# Patient Record
Sex: FEMALE | Race: WHITE | Employment: UNEMPLOYED | URBAN - NONMETROPOLITAN AREA
[De-identification: names, ages, dates, MRNs, and addresses within clinical notes are randomized per-mention and may not be internally consistent; named-entity substitution may affect disease eponyms.]

---

## 2017-05-16 ENCOUNTER — OFFICE VISIT (OUTPATIENT)
Dept: OBGYN | Age: 52
End: 2017-05-16
Payer: COMMERCIAL

## 2017-05-16 ENCOUNTER — HOSPITAL ENCOUNTER (OUTPATIENT)
Dept: WOMENS IMAGING | Age: 52
Discharge: HOME | End: 2017-05-16
Payer: COMMERCIAL

## 2017-05-16 VITALS
DIASTOLIC BLOOD PRESSURE: 74 MMHG | HEART RATE: 68 BPM | WEIGHT: 126 LBS | BODY MASS INDEX: 19.78 KG/M2 | SYSTOLIC BLOOD PRESSURE: 121 MMHG | HEIGHT: 67 IN

## 2017-05-16 DIAGNOSIS — N63.20 BREAST MASS, LEFT: ICD-10-CM

## 2017-05-16 DIAGNOSIS — Z98.82 HISTORY OF BREAST AUGMENTATION: ICD-10-CM

## 2017-05-16 DIAGNOSIS — N64.4 BREAST TENDERNESS IN FEMALE: Primary | ICD-10-CM

## 2017-05-16 DIAGNOSIS — N64.4 BREAST TENDERNESS IN FEMALE: ICD-10-CM

## 2017-05-16 PROCEDURE — 99213 OFFICE O/P EST LOW 20 MIN: CPT

## 2017-05-16 PROCEDURE — 76642 ULTRASOUND BREAST LIMITED: CPT

## 2017-05-16 PROCEDURE — G0279 TOMOSYNTHESIS, MAMMO: HCPCS

## 2017-05-16 ASSESSMENT — ENCOUNTER SYMPTOMS
BACK PAIN: 0
BLOOD IN STOOL: 0
TROUBLE SWALLOWING: 0
SHORTNESS OF BREATH: 0
CONSTIPATION: 0
COLOR CHANGE: 0
DIARRHEA: 0
COUGH: 0

## 2017-08-29 ENCOUNTER — OFFICE VISIT (OUTPATIENT)
Dept: OBGYN | Age: 52
End: 2017-08-29
Payer: COMMERCIAL

## 2017-08-29 ENCOUNTER — HOSPITAL ENCOUNTER (OUTPATIENT)
Age: 52
Setting detail: SPECIMEN
Discharge: HOME | End: 2017-08-29
Payer: COMMERCIAL

## 2017-08-29 VITALS
HEART RATE: 64 BPM | TEMPERATURE: 98.7 F | BODY MASS INDEX: 20.88 KG/M2 | SYSTOLIC BLOOD PRESSURE: 101 MMHG | HEIGHT: 67 IN | WEIGHT: 133 LBS | DIASTOLIC BLOOD PRESSURE: 69 MMHG

## 2017-08-29 DIAGNOSIS — D50.8 IRON DEFICIENCY ANEMIA SECONDARY TO INADEQUATE DIETARY IRON INTAKE: ICD-10-CM

## 2017-08-29 DIAGNOSIS — Z01.419 ENCOUNTER FOR GYNECOLOGICAL EXAMINATION WITHOUT ABNORMAL FINDING: Primary | ICD-10-CM

## 2017-08-29 PROCEDURE — 88142 CYTOPATH C/V THIN LAYER: CPT

## 2017-08-29 PROCEDURE — 99396 PREV VISIT EST AGE 40-64: CPT

## 2017-08-29 ASSESSMENT — ENCOUNTER SYMPTOMS
DIARRHEA: 0
COUGH: 0
CONSTIPATION: 0
BACK PAIN: 0
COLOR CHANGE: 0
TROUBLE SWALLOWING: 0
SHORTNESS OF BREATH: 0
BLOOD IN STOOL: 0

## 2017-12-05 ENCOUNTER — OFFICE VISIT (OUTPATIENT)
Dept: OBGYN | Age: 52
End: 2017-12-05
Payer: COMMERCIAL

## 2017-12-05 VITALS
DIASTOLIC BLOOD PRESSURE: 78 MMHG | HEIGHT: 67 IN | HEART RATE: 54 BPM | WEIGHT: 141 LBS | SYSTOLIC BLOOD PRESSURE: 124 MMHG | BODY MASS INDEX: 22.13 KG/M2

## 2017-12-05 DIAGNOSIS — N95.1 PERIMENOPAUSAL SYMPTOMS: ICD-10-CM

## 2017-12-05 DIAGNOSIS — Z98.82 HISTORY OF BREAST AUGMENTATION: ICD-10-CM

## 2017-12-05 DIAGNOSIS — Z80.3 FAMILY HISTORY OF BREAST CANCER IN FEMALE: ICD-10-CM

## 2017-12-05 DIAGNOSIS — E34.9 TESTOSTERONE INSUFFICIENCY: ICD-10-CM

## 2017-12-05 DIAGNOSIS — R92.1 BREAST CALCIFICATION, LEFT: ICD-10-CM

## 2017-12-05 DIAGNOSIS — E55.9 VITAMIN D DEFICIENCY: ICD-10-CM

## 2017-12-05 DIAGNOSIS — N64.4 BREAST PAIN IN FEMALE: Primary | ICD-10-CM

## 2017-12-05 DIAGNOSIS — R53.83 OTHER FATIGUE: ICD-10-CM

## 2017-12-05 PROCEDURE — 99214 OFFICE O/P EST MOD 30 MIN: CPT

## 2017-12-05 RX ORDER — MULTIVIT-MIN/IRON/FOLIC ACID/K 18-600-40
CAPSULE ORAL
COMMUNITY
End: 2020-06-08 | Stop reason: ALTCHOICE

## 2017-12-05 ASSESSMENT — ENCOUNTER SYMPTOMS
RESPIRATORY NEGATIVE: 1
GASTROINTESTINAL NEGATIVE: 1
EYES NEGATIVE: 1
ALLERGIC/IMMUNOLOGIC NEGATIVE: 1

## 2017-12-05 NOTE — PATIENT INSTRUCTIONS
problems, or circulation problems in the blood vessels of your eyes;  · endometriosis;  · a problem with your thyroid or gallbladder;  · kidney disease;  · asthma or other breathing problems;  · epilepsy or other seizure disorder;  · migraine headaches;  · systemic lupus erythematosus (SLE);  · high levels of calcium in your blood;  · risk factors for coronary artery disease (such as diabetes, smoking, being overweight, having high blood pressure or high cholesterol); or  · if you use hormone medications to treat cancer of the breast, ovary, or uterus. This medicine may increase your risk of developing cancer of the breast or uterus. Your risk of uterine cancer may be greater if you are overweight. This medicine may also increase your risk of dementia, heart attack, stroke, or blood clot. Ask your doctor about your individual risk. FDA pregnancy category X. This medication can harm an unborn baby or cause birth defects. Do not use bazedoxifene and conjugated estrogens if you are pregnant. Tell your doctor right away if you become pregnant during treatment. It is not known whether bazedoxifene and conjugated estrogens passes into breast milk or if it could harm a nursing baby. You should not breast-feed while using this medicine. How should I take bazedoxifene and conjugated estrogens? This medicine is usually taken once daily. Follow all directions on your prescription label. Bazedoxifene and conjugated estrogens is for short-term use at the lowest dose needed to treat your condition. Do not take this medicine in larger or smaller amounts or for longer than recommended. You may take bazedoxifene and conjugated estrogens with or without food. Take the medicine at the same time each day. Do not crush, chew, or break the tablet. Swallow it whole. Your doctor should check your progress on a regular basis to determine whether you should continue this treatment.   Have regular physical exams and mammograms, and self-examine your breasts for lumps on a monthly basis while using this medicine. If you need surgery or medical tests or if you will be on bed rest, you may need to stop using this medication for a short time. Any doctor or surgeon who treats you should know that you are taking bazedoxifene and conjugated estrogens. Your doctor may have you take extra calcium and vitamin D while you are taking bazedoxifene and conjugated estrogens. Take only the amount that your doctor has prescribed. Store at room temperature away from moisture and heat. Keep each tablet in its blister pack until you are ready to take it. Do not use a pill box for this medicine. Write down the date you open a Duavee foil pouch. After opening the pouch, you should use the medicine within 60 days. What happens if I miss a dose? Take the missed dose as soon as you remember. Skip the missed dose if it is almost time for your next scheduled dose. Do not take extra medicine to make up the missed dose. What happens if I overdose? Seek emergency medical attention or call the Poison Help line at 1-710.331.9420. What should I avoid while taking bazedoxifene and conjugated estrogens? Do not take progestins or any other estrogen while you are taking bazedoxifene and conjugated estrogens. What are the possible side effects of bazedoxifene and conjugated estrogens? Get emergency medical help if you have any of these signs of an allergic reaction: hives; difficult breathing; swelling of your face, lips, tongue, or throat.   Stop using bazedoxifene and conjugated estrogens and call your doctor at once if you have:  · sudden vision loss;  · a lump in your breast;  · signs that you may need a lower dose --abnormal vaginal bleeding, dizziness, tiredness, stomach pain, vomiting, breast tenderness  · signs of a stroke --sudden numbness or weakness (especially on one side of the body), sudden severe headache, slurred speech, problems with vision or balance;  · signs of a blood clot in the lung --chest pain, sudden cough, wheezing, rapid breathing, coughing up blood;  · signs of a blood clot in your leg --pain, swelling, warmth, or redness in one or both legs;  · heart attack symptoms --chest pain or pressure, pain spreading to your jaw or shoulder, nausea, sweating; or  · liver problems --nausea, upper stomach pain, itching, tired feeling, loss of appetite, dark urine, hilda-colored stools, jaundice (yellowing of the skin or eyes). Common side effects may include:  · mild dizziness;  · nausea, stomach pain or discomfort, diarrhea;  · neck pain, muscle spasm; or  · throat or sinus pain. This is not a complete list of side effects and others may occur. Call your doctor for medical advice about side effects. You may report side effects to FDA at 3-279-FDA-6088. What other drugs will affect bazedoxifene and conjugated estrogens? Other drugs may interact with bazedoxifene and conjugated estrogens, including prescription and over-the-counter medicines, vitamins, and herbal products. Tell each of your health care providers about all medicines you use now and any medicine you start or stop using. Where can I get more information? Your pharmacist can provide more information about bazedoxifene and conjugated estrogens. Remember, keep this and all other medicines out of the reach of children, never share your medicines with others, and use this medication only for the indication prescribed. Every effort has been made to ensure that the information provided by [address] is accurate, up-to-date, and complete, but no guarantee is made to that effect. Drug information contained herein may be time sensitive.  [facility] information has been compiled for use by healthcare practitioners and consumers in the [place] and therefore [facility] does not warrant that uses outside of the [place] are appropriate, unless specifically indicated otherwise. [facility] drug information does not endorse drugs, diagnose patients or recommend therapy. [facility] drug information is an informational resource designed to assist licensed healthcare practitioners in caring for their patients and/or to serve consumers viewing this service as a supplement to, and not a substitute for, the expertise, skill, knowledge and judgment of healthcare practitioners. The absence of a warning for a given drug or drug combination in no way should be construed to indicate that the drug or drug combination is safe, effective or appropriate for any given patient. [facility] does not assume any responsibility for any aspect of healthcare administered with the aid of information [facility] provides. The information contained herein is not intended to cover all possible uses, directions, precautions, warnings, drug interactions, allergic reactions, or adverse effects. If you have questions about the drugs you are taking, check with your doctor, nurse or pharmacist.  Copyright 1037-1197 [address]. Version: 1.01. Revision date: 1/9/2014. Care instructions adapted under license by [facility] ([facility]). If you have questions about a medical condition or this instruction, always ask your healthcare professional. [place] [address] any warranty or liability for your use of this information.

## 2017-12-05 NOTE — PROGRESS NOTES
range of motion. Neurological: She is alert and oriented to person, place, and time. Skin: Skin is warm and dry. Psychiatric: She has a normal mood and affect. Her behavior is normal.       Assessment:      1. Breast pain in female  MRI BREAST BILATERAL W WO CONTRAST   2. Vitamin D deficiency     3. Testosterone insufficiency     4. History of breast augmentation  MRI BREAST BILATERAL W WO CONTRAST   5. Perimenopausal symptoms     6. Family history of breast cancer in female  MRI BREAST BILATERAL W WO CONTRAST   7. Other fatigue     8. Breast calcification, left  MRI BREAST BILATERAL W WO CONTRAST           Plan:      MEDICATIONS:  No orders of the defined types were placed in this encounter. ORDERS:  Orders Placed This Encounter   Procedures    MRI BREAST BILATERAL W WO CONTRAST     1. Reviewed all lab work with patient. 2.  Copy scanned into media. 3.  Reviewed mammogram and breast US from May. 4.  The risks and benefits of hormone therapy were discussed with patient. 5.  Discussed a study in which 3 out of 1000 women not on hormone therapy got breast cancer whereas 4 out of 1000 taking the hormones got breast cancer. 6.  The options of bioidentical HRT versus duavee were discussed. 7.  HRT is not recommended for any patient with a personal history of breast cancer or blood clots. 8.  OTC soy based medication discussed. 9.  Duavee (pamphlet provided), bio identical (pamphlet provided) discussed through compounding pharmacy. 10.  Pt will finish Vit d supplementation and see how she is feeling  11. MRI to be ordered due to family history and nl mammo and ultrasound in May. I spent over 25 min with this patient of which over half involved discussing plan of care for labs, symptoms, breast pain and treatment options.

## 2018-01-09 ENCOUNTER — HOSPITAL ENCOUNTER (OUTPATIENT)
Dept: MRI IMAGING | Age: 53
Discharge: HOME | End: 2018-01-09
Payer: COMMERCIAL

## 2018-01-09 DIAGNOSIS — Z80.3 FAMILY HISTORY OF BREAST CANCER IN FEMALE: ICD-10-CM

## 2018-01-09 DIAGNOSIS — Z98.82 HISTORY OF BREAST AUGMENTATION: ICD-10-CM

## 2018-01-09 DIAGNOSIS — R92.1 BREAST CALCIFICATION, LEFT: ICD-10-CM

## 2018-01-09 DIAGNOSIS — N64.4 BREAST PAIN IN FEMALE: ICD-10-CM

## 2018-01-19 ENCOUNTER — HOSPITAL ENCOUNTER (OUTPATIENT)
Dept: MRI IMAGING | Age: 53
Discharge: HOME | End: 2018-01-19
Payer: COMMERCIAL

## 2018-01-19 DIAGNOSIS — Z98.82 HISTORY OF BREAST AUGMENTATION: ICD-10-CM

## 2018-01-19 DIAGNOSIS — N64.4 BREAST PAIN IN FEMALE: ICD-10-CM

## 2018-01-19 DIAGNOSIS — Z80.3 FAMILY HISTORY OF BREAST CANCER IN FEMALE: ICD-10-CM

## 2018-01-19 DIAGNOSIS — R92.1 BREAST CALCIFICATION, LEFT: ICD-10-CM

## 2018-01-19 PROCEDURE — A9577 INJ MULTIHANCE: HCPCS

## 2018-01-19 PROCEDURE — 6360000004 HC RX CONTRAST MEDICATION

## 2018-01-19 PROCEDURE — C8908 MRI W/O FOL W/CONT, BREAST,: HCPCS

## 2018-01-19 RX ADMIN — GADOBENATE DIMEGLUMINE 12.5 ML: 529 INJECTION, SOLUTION INTRAVENOUS at 13:45

## 2018-01-24 ENCOUNTER — TELEPHONE (OUTPATIENT)
Dept: OBGYN | Age: 53
End: 2018-01-24

## 2018-01-24 NOTE — TELEPHONE ENCOUNTER
Pt is aware of results  and wants to take [address]  and Testosterone (whatever pharmacy)  that Dr Fred Naidu discussed and recommended with pt. Pt v/u of results.

## 2018-07-09 ENCOUNTER — HOSPITAL ENCOUNTER (OUTPATIENT)
Dept: NON INVASIVE DIAGNOSTICS | Age: 53
Discharge: HOME | End: 2018-07-09
Payer: COMMERCIAL

## 2018-07-09 PROCEDURE — 0296T PR EXT ECG > 48HR TO 21 DAY RCRD W/CONECT INTL RCRD: CPT | Performed by: INTERNAL MEDICINE

## 2018-07-10 ENCOUNTER — HOSPITAL ENCOUNTER (OUTPATIENT)
Dept: NON INVASIVE DIAGNOSTICS | Age: 53
Discharge: HOME | End: 2018-07-10
Payer: COMMERCIAL

## 2018-07-10 PROCEDURE — 93229 REMOTE 30 DAY ECG TECH SUPP: CPT

## 2018-07-31 PROCEDURE — 0298T PR EXT ECG > 48HR TO 21 DAY REVIEW AND INTERPRETATN: CPT | Performed by: INTERNAL MEDICINE

## 2018-07-31 NOTE — PROCEDURES
[id number] [facility]    EDER Report      Kamaljit Abreu    : 1965      Test Date:  18    Analysis Date:  18    Date Interpreted: 2018        1. Nearly 12 days 8 hours of rhythm are processed. 2.  The underlying rhythm is normal sinus rhythm at a mean heart rate of 71 bpm, with a range of 42 to 174 bpm.    3.  The were 82428 extra supraventricular beats. The majority of these were single isolated PAC's; there was the fastest episode of SVT lasting 8 beats at a rate of 174 bpm, and the longest SVT lasting 13 beats at a rate of 104 bpm.    4.  The were 0 extra ventricular beats. The majority of these were single isolated PVC's, there was no VT. 5.  Prolonged pauses or high degree AV block were not noted. 6.  Triggered events or triggered events were reported and included SVT, NSR, PACs and ventricular bigeminy and ventricular trigeminy. Impression: This ZIO Patch shows significant supraventricular ectopy and is without prolonged pauses. Symptoms were reported with extra beats. There was no significant ventricular ectopy.         Electronically signed by Sharin Rubinstein, MD on 18

## 2018-12-04 ENCOUNTER — OFFICE VISIT (OUTPATIENT)
Dept: OBGYN | Age: 53
End: 2018-12-04
Payer: COMMERCIAL

## 2018-12-04 VITALS
HEIGHT: 67 IN | WEIGHT: 141 LBS | TEMPERATURE: 98.7 F | HEART RATE: 63 BPM | DIASTOLIC BLOOD PRESSURE: 77 MMHG | BODY MASS INDEX: 22.13 KG/M2 | SYSTOLIC BLOOD PRESSURE: 118 MMHG

## 2018-12-04 DIAGNOSIS — Z80.3 FAMILY HISTORY OF BREAST CANCER: ICD-10-CM

## 2018-12-04 DIAGNOSIS — N64.4 BREAST TENDERNESS IN FEMALE: Primary | ICD-10-CM

## 2018-12-04 PROCEDURE — 99213 OFFICE O/P EST LOW 20 MIN: CPT | Performed by: NURSE PRACTITIONER

## 2018-12-04 ASSESSMENT — ENCOUNTER SYMPTOMS
GASTROINTESTINAL NEGATIVE: 1
EYES NEGATIVE: 1
RESPIRATORY NEGATIVE: 1

## 2019-01-07 ENCOUNTER — HOSPITAL ENCOUNTER (OUTPATIENT)
Dept: ULTRASOUND IMAGING | Age: 54
Discharge: HOME | End: 2019-01-07
Payer: COMMERCIAL

## 2019-01-07 ENCOUNTER — TELEPHONE (OUTPATIENT)
Dept: OBGYN | Age: 54
End: 2019-01-07

## 2019-01-07 ENCOUNTER — HOSPITAL ENCOUNTER (OUTPATIENT)
Dept: WOMENS IMAGING | Age: 54
Discharge: HOME | End: 2019-01-07
Payer: COMMERCIAL

## 2019-01-07 DIAGNOSIS — Z80.3 FAMILY HISTORY OF BREAST CANCER: ICD-10-CM

## 2019-01-07 DIAGNOSIS — N64.4 BREAST PAIN, LEFT: ICD-10-CM

## 2019-01-07 DIAGNOSIS — N64.4 BREAST TENDERNESS IN FEMALE: ICD-10-CM

## 2019-01-07 PROCEDURE — G0279 TOMOSYNTHESIS, MAMMO: HCPCS

## 2019-01-07 PROCEDURE — 76642 ULTRASOUND BREAST LIMITED: CPT

## 2019-01-14 ENCOUNTER — TELEPHONE (OUTPATIENT)
Dept: OBGYN | Age: 54
End: 2019-01-14

## 2019-02-12 ENCOUNTER — OFFICE VISIT (OUTPATIENT)
Dept: OBGYN | Age: 54
End: 2019-02-12
Payer: COMMERCIAL

## 2019-02-12 VITALS
HEART RATE: 66 BPM | BODY MASS INDEX: 21.82 KG/M2 | HEIGHT: 67 IN | DIASTOLIC BLOOD PRESSURE: 72 MMHG | TEMPERATURE: 98.4 F | WEIGHT: 139 LBS | SYSTOLIC BLOOD PRESSURE: 127 MMHG

## 2019-02-12 DIAGNOSIS — Z12.4 SCREENING FOR CERVICAL CANCER: ICD-10-CM

## 2019-02-12 DIAGNOSIS — Z01.419 WELL WOMAN EXAM: Primary | ICD-10-CM

## 2019-02-12 DIAGNOSIS — N92.6 IRREGULAR PERIODS: ICD-10-CM

## 2019-02-12 DIAGNOSIS — Z11.51 SCREENING FOR HPV (HUMAN PAPILLOMAVIRUS): ICD-10-CM

## 2019-02-12 PROCEDURE — 99396 PREV VISIT EST AGE 40-64: CPT | Performed by: NURSE PRACTITIONER

## 2019-02-12 ASSESSMENT — ENCOUNTER SYMPTOMS
RESPIRATORY NEGATIVE: 1
EYES NEGATIVE: 1
GASTROINTESTINAL NEGATIVE: 1

## 2019-02-18 LAB
HPV TYPE 16: NOT DETECTED
HPV TYPE 18: NOT DETECTED
INTERPRETATION: NORMAL
OTHER HIGH RISK HPV: NOT DETECTED
SOURCE: NORMAL

## 2019-06-19 NOTE — PROGRESS NOTES
Subjective:      Patient ID: Kailee Lara is a 47 y.o. female  MD Sanjay Tavarez, *    HPI  Chief Complaint   Patient presents with    Gastroesophageal Reflux     Patient seen for c/o worsening reflux. Last egd noted gastritis. She was taking lansoprazole bid at that time. She has since reduced to once daily ppi. She tried other PPIs but lansoprazole seemed to work the best.   She says she wakes at night frequently with indigestion. Feels like a sick feeling. Has to take gaviscon for relief. She has been having some other problems also and not sure if related. Reports neck pain, sinus drainage, hormone changes. She is careful with her diet and avoids food triggers. Does not eat late at night. Has hob elevated. She denies dysphagia, melena, vomiting. No weight loss or anemia reported. No lower gi complaints. Colonoscopy screening is up to date. Family History   Problem Relation Age of Onset    Dementia Mother     High Blood Pressure Mother     Cancer Father         Lung-Smoker    Breast Cancer Sister 50    Cancer Maternal Grandfather         rectal cancer    Colon Cancer Maternal Grandfather     Colon Polyps Maternal Grandfather     Esophageal Cancer Neg Hx     Liver Cancer Neg Hx     Liver Disease Neg Hx     Rectal Cancer Neg Hx     Stomach Cancer Neg Hx        Past Medical History:   Diagnosis Date    Acid reflux     Anemia     BRCA negative     Chronic back pain     Irritable bowel syndrome     Skipped heart beats     Thyroid mass        Past Surgical History:   Procedure Laterality Date    BREAST SURGERY      Augmentation,Biopsy+    CHOLECYSTECTOMY      COLONOSCOPY  2008???    [facility]    COLONOSCOPY  12/12/12        HEMORRHOID SURGERY      THYROID SURGERY      Biopsy    TONSILLECTOMY      TUBAL LIGATION      UPPER GASTROINTESTINAL ENDOSCOPY  2008? ?     Dr Neal Sandra  2/12/2015        Current Outpatient Medications   Medication Sig Dispense Refill    Loratadine (CLARITIN PO) Take by mouth daily as needed      mometasone (NASONEX) 50 MCG/ACT nasal spray 2 sprays by Each Nostril route as needed      nortriptyline (PAMELOR) 10 MG capsule Take 1 capsule by mouth nightly 30 capsule 5    Cholecalciferol (VITAMIN D) 2000 units CAPS capsule Take by mouth      lansoprazole (PREVACID) 30 MG capsule TAKE ONE CAPSULE BY MOUTHTWICE A DAY BEFORE MEALS 60 capsule 5    Alum Hydroxide-Mag Carbonate (GAVISCON PO) Take by mouth every evening       levothyroxine (SYNTHROID) 50 MCG tablet Take 50 mcg by mouth Daily.  CARAFATE 1 GM/10ML suspension TAKE 10MLS BY MOUTH THREETIMES DAILY BEFORE MEALS 1200 mL 3     No current facility-administered medications for this visit. Allergies   Allergen Reactions    Celebrex [Celecoxib]     Nsaids         reports that she has never smoked. She has never used smokeless tobacco. She reports that she drinks alcohol. She reports that she does not use drugs. Review of Systems   Constitutional: Negative for appetite change, fever and unexpected weight change. HENT: Negative for sore throat and voice change. Respiratory: Negative for cough, chest tightness and shortness of breath. Cardiovascular: Negative for chest pain, palpitations and leg swelling. Gastrointestinal: Negative for abdominal distention, abdominal pain, blood in stool, constipation, diarrhea, nausea, rectal pain and vomiting. Acid reflux, indigestion   Musculoskeletal: Positive for back pain and neck pain. Negative for arthralgias and gait problem. Skin: Negative for pallor, rash and wound. Allergic/Immunologic: Positive for environmental allergies. Neurological: Negative for dizziness, weakness and light-headedness. Hematological: Negative for adenopathy. Does not bruise/bleed easily. All other systems reviewed and are negative.       Objective: Physical Exam   Constitutional: She is oriented to person, place, and time. She appears well-developed and well-nourished. No distress. /74   Pulse 67   Ht 5' 7\" (1.702 m)   Wt 139 lb (63 kg)   SpO2 100%   BMI 21.77 kg/m²      Eyes: Conjunctivae are normal. No scleral icterus. Neck: No tracheal deviation present. Cardiovascular: Normal rate and regular rhythm. Exam reveals no gallop and no friction rub. No murmur heard. Pulmonary/Chest: Effort normal and breath sounds normal. No respiratory distress. She has no wheezes. She has no rhonchi. She has no rales. Abdominal: Soft. Normal appearance and bowel sounds are normal. She exhibits no distension and no mass. There is no hepatomegaly. There is no tenderness. There is no rebound and no guarding. Musculoskeletal: She exhibits no edema. Neurological: She is alert and oriented to person, place, and time. She has normal strength. Skin: Skin is warm, dry and intact. No cyanosis. No pallor. Psychiatric: She has a normal mood and affect. Her behavior is normal. Thought content normal. Cognition and memory are normal.       Assessment:      1. Chronic GERD    2. Gastritis determined by biopsy    3. Indigestion          Plan:      Okay to use H2 blocker at bedtime or for breakthrough symptoms: zantac or pepcid otc    Add nortriptyline at hs  Orders Placed This Encounter   Medications    nortriptyline (PAMELOR) 10 MG capsule     Sig: Take 1 capsule by mouth nightly     Dispense:  30 capsule     Refill:  5     Continue once daily prevacid    Schedule EGD    Nothing to eat or drink after midnight. No driving for 24 hours after procedure. Bring a  to procedure. No aspirin, NSAIDs, fish oil 5 days before procedure. I have discussed the benefits, alternatives, and risks (including bleeding, perforation and death)  for pursuing Endoscopy (EGD/Colonscopy/EUS/ERCP) with the patient and they are willing to continue.  We also discussed the need for anesthesia, IV access, proper dietary changes, medication changes if necessary, and need for bowel prep (if ordered) prior to their Endoscopic procedure. They are aware they must have someone accompany them to their scheduled procedure to drive them home - they agree to the above and are willing to continue.        Plan for anesthesia: MAC  no reported complications

## 2019-06-20 ENCOUNTER — OFFICE VISIT (OUTPATIENT)
Dept: GASTROENTEROLOGY | Age: 54
End: 2019-06-20
Payer: COMMERCIAL

## 2019-06-20 ENCOUNTER — HOSPITAL ENCOUNTER (OUTPATIENT)
Dept: GENERAL RADIOLOGY | Age: 54
Discharge: HOME | End: 2019-06-20
Payer: COMMERCIAL

## 2019-06-20 VITALS
HEIGHT: 67 IN | OXYGEN SATURATION: 100 % | BODY MASS INDEX: 21.82 KG/M2 | HEART RATE: 67 BPM | DIASTOLIC BLOOD PRESSURE: 74 MMHG | SYSTOLIC BLOOD PRESSURE: 110 MMHG | WEIGHT: 139 LBS

## 2019-06-20 DIAGNOSIS — K30 INDIGESTION: ICD-10-CM

## 2019-06-20 DIAGNOSIS — K21.9 CHRONIC GERD: Primary | ICD-10-CM

## 2019-06-20 DIAGNOSIS — K29.70 GASTRITIS DETERMINED BY BIOPSY: ICD-10-CM

## 2019-06-20 DIAGNOSIS — M54.2 NECK PAIN: ICD-10-CM

## 2019-06-20 PROCEDURE — 72040 X-RAY EXAM NECK SPINE 2-3 VW: CPT

## 2019-06-20 PROCEDURE — 99214 OFFICE O/P EST MOD 30 MIN: CPT | Performed by: NURSE PRACTITIONER

## 2019-06-20 RX ORDER — NORTRIPTYLINE HYDROCHLORIDE 10 MG/1
10 CAPSULE ORAL NIGHTLY
Qty: 30 CAPSULE | Refills: 5 | Status: SHIPPED | OUTPATIENT
Start: 2019-06-20 | End: 2020-06-08 | Stop reason: ALTCHOICE

## 2019-06-20 RX ORDER — MOMETASONE FUROATE 50 UG/1
2 SPRAY, METERED NASAL PRN
COMMUNITY
End: 2021-08-27 | Stop reason: ALTCHOICE

## 2019-06-20 ASSESSMENT — ENCOUNTER SYMPTOMS
NAUSEA: 0
COUGH: 0
SORE THROAT: 0
RECTAL PAIN: 0
CONSTIPATION: 0
SHORTNESS OF BREATH: 0
ABDOMINAL DISTENTION: 0
VOICE CHANGE: 0
BLOOD IN STOOL: 0
DIARRHEA: 0
BACK PAIN: 1
CHEST TIGHTNESS: 0
ABDOMINAL PAIN: 0
VOMITING: 0

## 2019-06-24 ENCOUNTER — TELEPHONE (OUTPATIENT)
Dept: OBGYN | Age: 54
End: 2019-06-24

## 2019-06-24 RX ORDER — FLUCONAZOLE 150 MG/1
TABLET ORAL
Qty: 2 TABLET | Refills: 0 | Status: SHIPPED | OUTPATIENT
Start: 2019-06-24 | End: 2019-06-25

## 2019-06-27 DIAGNOSIS — K21.9 CHRONIC GERD: Primary | ICD-10-CM

## 2019-06-27 RX ORDER — SUCRALFATE ORAL 1 G/10ML
SUSPENSION ORAL
Qty: 1200 ML | Refills: 3 | Status: SHIPPED | OUTPATIENT
Start: 2019-06-27 | End: 2020-06-08 | Stop reason: ALTCHOICE

## 2019-06-28 ENCOUNTER — TELEPHONE (OUTPATIENT)
Dept: NEUROSURGERY | Age: 54
End: 2019-06-28

## 2019-06-28 NOTE — TELEPHONE ENCOUNTER
First attempt to reach patient to schedule new patient appointment with neurosurgery. Patient requests that I call her back on Monday.

## 2019-07-01 ENCOUNTER — TELEPHONE (OUTPATIENT)
Dept: NEUROSURGERY | Age: 54
End: 2019-07-01

## 2019-07-03 ENCOUNTER — TELEPHONE (OUTPATIENT)
Dept: NEUROSURGERY | Age: 54
End: 2019-07-03

## 2019-07-03 NOTE — TELEPHONE ENCOUNTER
Third attempt to reach patient to schedule new patient appointment with neurosurgery.  Left voicemail with callback number regarding referral.

## 2019-07-17 ENCOUNTER — TELEPHONE (OUTPATIENT)
Dept: NEUROSURGERY | Age: 54
End: 2019-07-17

## 2019-08-05 ENCOUNTER — ANESTHESIA (OUTPATIENT)
Dept: ENDOSCOPY | Age: 54
End: 2019-08-05
Payer: COMMERCIAL

## 2019-08-05 ENCOUNTER — HOSPITAL ENCOUNTER (OUTPATIENT)
Age: 54
Setting detail: OUTPATIENT SURGERY
Discharge: HOME | End: 2019-08-05
Attending: INTERNAL MEDICINE | Admitting: INTERNAL MEDICINE
Payer: COMMERCIAL

## 2019-08-05 ENCOUNTER — ANESTHESIA EVENT (OUTPATIENT)
Dept: ENDOSCOPY | Age: 54
End: 2019-08-05
Payer: COMMERCIAL

## 2019-08-05 VITALS
RESPIRATION RATE: 18 BRPM | BODY MASS INDEX: 21.97 KG/M2 | HEIGHT: 67 IN | WEIGHT: 140 LBS | HEART RATE: 70 BPM | TEMPERATURE: 99.5 F | SYSTOLIC BLOOD PRESSURE: 119 MMHG | DIASTOLIC BLOOD PRESSURE: 76 MMHG | OXYGEN SATURATION: 100 %

## 2019-08-05 VITALS
DIASTOLIC BLOOD PRESSURE: 66 MMHG | OXYGEN SATURATION: 99 % | SYSTOLIC BLOOD PRESSURE: 113 MMHG | RESPIRATION RATE: 18 BRPM

## 2019-08-05 LAB — HCG(URINE) PREGNANCY TEST: NEGATIVE

## 2019-08-05 PROCEDURE — 7100000010 HC PHASE II RECOVERY - FIRST 15 MIN: Performed by: INTERNAL MEDICINE

## 2019-08-05 PROCEDURE — 3700000000 HC ANESTHESIA ATTENDED CARE: Performed by: INTERNAL MEDICINE

## 2019-08-05 PROCEDURE — 3700000001 HC ADD 15 MINUTES (ANESTHESIA): Performed by: INTERNAL MEDICINE

## 2019-08-05 PROCEDURE — 2580000003 HC RX 258: Performed by: INTERNAL MEDICINE

## 2019-08-05 PROCEDURE — 2500000003 HC RX 250 WO HCPCS

## 2019-08-05 PROCEDURE — 7100000011 HC PHASE II RECOVERY - ADDTL 15 MIN: Performed by: INTERNAL MEDICINE

## 2019-08-05 PROCEDURE — 6360000002 HC RX W HCPCS

## 2019-08-05 PROCEDURE — 84703 CHORIONIC GONADOTROPIN ASSAY: CPT

## 2019-08-05 PROCEDURE — 3609012400 HC EGD TRANSORAL BIOPSY SINGLE/MULTIPLE: Performed by: INTERNAL MEDICINE

## 2019-08-05 PROCEDURE — 2709999900 HC NON-CHARGEABLE SUPPLY: Performed by: INTERNAL MEDICINE

## 2019-08-05 PROCEDURE — 43239 EGD BIOPSY SINGLE/MULTIPLE: CPT | Performed by: INTERNAL MEDICINE

## 2019-08-05 RX ORDER — FENTANYL CITRATE 50 UG/ML
INJECTION, SOLUTION INTRAMUSCULAR; INTRAVENOUS PRN
Status: DISCONTINUED | OUTPATIENT
Start: 2019-08-05 | End: 2019-08-05 | Stop reason: SDUPTHER

## 2019-08-05 RX ORDER — PROPOFOL 10 MG/ML
INJECTION, EMULSION INTRAVENOUS PRN
Status: DISCONTINUED | OUTPATIENT
Start: 2019-08-05 | End: 2019-08-05 | Stop reason: SDUPTHER

## 2019-08-05 RX ORDER — LIDOCAINE HYDROCHLORIDE 10 MG/ML
1 INJECTION, SOLUTION EPIDURAL; INFILTRATION; INTRACAUDAL; PERINEURAL ONCE
Status: DISCONTINUED | OUTPATIENT
Start: 2019-08-05 | End: 2019-08-05 | Stop reason: HOSPADM

## 2019-08-05 RX ORDER — MIDAZOLAM HYDROCHLORIDE 1 MG/ML
INJECTION INTRAMUSCULAR; INTRAVENOUS PRN
Status: DISCONTINUED | OUTPATIENT
Start: 2019-08-05 | End: 2019-08-05 | Stop reason: SDUPTHER

## 2019-08-05 RX ORDER — SODIUM CHLORIDE, SODIUM LACTATE, POTASSIUM CHLORIDE, CALCIUM CHLORIDE 600; 310; 30; 20 MG/100ML; MG/100ML; MG/100ML; MG/100ML
INJECTION, SOLUTION INTRAVENOUS CONTINUOUS
Status: DISCONTINUED | OUTPATIENT
Start: 2019-08-05 | End: 2019-08-05 | Stop reason: HOSPADM

## 2019-08-05 RX ORDER — LIDOCAINE HYDROCHLORIDE 10 MG/ML
INJECTION, SOLUTION EPIDURAL; INFILTRATION; INTRACAUDAL; PERINEURAL PRN
Status: DISCONTINUED | OUTPATIENT
Start: 2019-08-05 | End: 2019-08-05 | Stop reason: SDUPTHER

## 2019-08-05 RX ADMIN — MIDAZOLAM 1 MG: 1 INJECTION INTRAMUSCULAR; INTRAVENOUS at 08:35

## 2019-08-05 RX ADMIN — SODIUM CHLORIDE, POTASSIUM CHLORIDE, SODIUM LACTATE AND CALCIUM CHLORIDE: 600; 310; 30; 20 INJECTION, SOLUTION INTRAVENOUS at 08:16

## 2019-08-05 RX ADMIN — FENTANYL CITRATE 25 MCG: 50 INJECTION INTRAMUSCULAR; INTRAVENOUS at 08:26

## 2019-08-05 RX ADMIN — PROPOFOL 120 MG: 10 INJECTION, EMULSION INTRAVENOUS at 08:42

## 2019-08-05 RX ADMIN — FENTANYL CITRATE 25 MCG: 50 INJECTION INTRAMUSCULAR; INTRAVENOUS at 08:35

## 2019-08-05 RX ADMIN — MIDAZOLAM 1 MG: 1 INJECTION INTRAMUSCULAR; INTRAVENOUS at 08:24

## 2019-08-05 RX ADMIN — LIDOCAINE HYDROCHLORIDE 30 MG: 10 INJECTION, SOLUTION EPIDURAL; INFILTRATION; INTRACAUDAL; PERINEURAL at 08:29

## 2019-08-05 ASSESSMENT — PAIN SCALES - GENERAL
PAINLEVEL_OUTOF10: 0
PAINLEVEL_OUTOF10: 0

## 2019-08-05 ASSESSMENT — LIFESTYLE VARIABLES: SMOKING_STATUS: 0

## 2019-08-05 ASSESSMENT — PAIN - FUNCTIONAL ASSESSMENT: PAIN_FUNCTIONAL_ASSESSMENT: 0-10

## 2019-08-05 NOTE — ANESTHESIA POSTPROCEDURE EVALUATION
Department of Anesthesiology  Postprocedure Note    Patient: Kristine Gilmore  MRN: [id number]  YOB: 1965  Date of evaluation: 8/5/2019  Time:  8:48 AM     Procedure Summary     Date:  08/05/19 Room / Location:  [facility] 10 / [facility] Endoscopy    Anesthesia Start:  0825 Anesthesia Stop:  0848    Procedure:  EGD BIOPSY (N/A Abdomen) Diagnosis:  (CHRONIC GERD - INDIGESTION)    Surgeon:  Concepción Barr MD Responsible Provider:  VERA Preciado CRNA    Anesthesia Type:  general ASA Status:  2          Anesthesia Type: general    Jacklyn Phase I: Jacklyn Score: 10    Jacklyn Phase II:      Last vitals: Reviewed and per EMR flowsheets.        Anesthesia Post Evaluation    Patient location during evaluation: bedside  Patient participation: complete - patient participated  Level of consciousness: awake and alert  Pain score: 0  Airway patency: patent  Nausea & Vomiting: no nausea and no vomiting  Complications: no  Cardiovascular status: hemodynamically stable  Respiratory status: acceptable and room air  Hydration status: stable

## 2019-08-05 NOTE — H&P
THREETIMES DAILY BEFORE MEALS 6/27/19   VERA Pitts   mometasone (NASONEX) 50 MCG/ACT nasal spray 2 sprays by Each Nostril route as needed    Historical Provider, MD   nortriptyline (PAMELOR) 10 MG capsule Take 1 capsule by mouth nightly 6/20/19   VERA Pitts   Cholecalciferol (VITAMIN D) 2000 units CAPS capsule Take by mouth    Historical Provider, MD       Past Medical History:  Past Medical History:   Diagnosis Date    Acid reflux     Anemia     BRCA negative     Chronic back pain     Irritable bowel syndrome     Skipped heart beats     Thyroid disease     Thyroid mass        Past Surgical History:  Past Surgical History:   Procedure Laterality Date    BREAST ENHANCEMENT SURGERY      BREAST SURGERY      Augmentation,Biopsy+    CHOLECYSTECTOMY      COLONOSCOPY  2008???    [facility]    COLONOSCOPY  12/12/12        HEMORRHOID SURGERY      THYROID SURGERY      Biopsy    TONSILLECTOMY      TUBAL LIGATION      UPPER GASTROINTESTINAL ENDOSCOPY  2008? ? Dr Branham Caper  2/12/2015           Social History:  Social History     Tobacco Use    Smoking status: Never Smoker    Smokeless tobacco: Never Used   Substance Use Topics    Alcohol use: Yes     Comment: couple times per week    Drug use: No       Vital Signs:   Vitals:    08/05/19 0753   BP: 129/82   Pulse: 81   Resp: 22   Temp: 99.5 °F (37.5 °C)   SpO2: 100%        Physical Exam:  Cardiac:  [x]WNL  []Comments:  Pulmonary:  [x]WNL   []Comments:  Neuro/Mental Status:  [x]WNL  []Comments:  Abdominal:  [x]WNL    []Comments:  Other:   []WNL  []Comments:    Informed Consent:  The risks and benefits of the procedure have been discussed with either the patient or if they cannot consent, their representative. Assessment:  Patient examined and appropriate for planned sedation and procedure. Plan:  Proceed with planned sedation and procedure as above.          Tabitha Ramsey, MD

## 2019-08-05 NOTE — ANESTHESIA PRE PROCEDURE
Department of Anesthesiology  Preprocedure Note       Name:  Nilam Wills   Age:  47 y.o.  :  1965                                          MRN:  [id number]         Date:  2019      Surgeon: Tristan Parra):  Poli Ramirez MD    Procedure: EGD BIOPSY (N/A Abdomen)    Medications prior to admission:   Prior to Admission medications    Medication Sig Start Date End Date Taking? Authorizing Provider   sucralfate (CARAFATE) 1 GM/10ML suspension TAKE 10MLS BY MOUTH THREETIMES DAILY BEFORE MEALS 19   VERA Sweeney   Loratadine (CLARITIN PO) Take by mouth daily as needed    Historical Provider, MD   mometasone (NASONEX) 50 MCG/ACT nasal spray 2 sprays by Each Nostril route as needed    Historical Provider, MD   nortriptyline (PAMELOR) 10 MG capsule Take 1 capsule by mouth nightly 19   VERA Sweeney   Cholecalciferol (VITAMIN D) 2000 units CAPS capsule Take by mouth    Historical Provider, MD   lansoprazole (PREVACID) 30 MG capsule TAKE ONE CAPSULE BY MOUTHTWICE A DAY BEFORE MEALS 16   VERA Sweeney   Alum Hydroxide-Mag Carbonate (GAVISCON PO) Take by mouth every evening     Historical Provider, MD   levothyroxine (SYNTHROID) 50 MCG tablet Take 50 mcg by mouth Daily. Historical Provider, MD       Current medications:    Current Facility-Administered Medications   Medication Dose Route Frequency Provider Last Rate Last Dose    lactated ringers infusion   Intravenous Continuous Poli Ramirez MD        lidocaine PF 1 % injection 1 mL  1 mL Intradermal Once Poli Ramirez MD           Allergies:     Allergies   Allergen Reactions    Celebrex [Celecoxib]        Problem List:    Patient Active Problem List   Diagnosis Code    LLQ abdominal pain R10.32    Chronic constipation K59.09    Epigastric pressure R10.13    Heartburn R12    Indigestion K30    Acid reflux K21.9    Esophageal spasm K22.4    Hemorrhoids K64.9    Chest pain R07.9    Breast

## 2019-08-05 NOTE — OP NOTE
Endoscopic Procedure Note    Patient: Iron Cerda: 1965  [facility] Rec#: [id number] Acc#: [id number]     Primary Care Provider Ed Templeton MD    Endoscopist: Denisse Garcia MD    Date of Procedure:  8/5/2019    Procedure:   1. EGD with cold biopsies    Indications:   1. Chronic GERD  2. Hx of esophageal spasms with atypical chest pain    Anesthesia:  Sedation was administered by anesthesia who monitored the patient during the procedure. Estimated Blood Loss: minimal    Procedure:   After reviewing the patient's chart and obtaining informed consent, the patient was placed in the left lateral decubitus position. A forward-viewing Olympus endoscope was lubricated and inserted through the mouth into the oropharynx. Under direct visualization, the upper esophagus was intubated. The scope was advanced to the level of the third portion of duodenum. Scope was slowly withdrawn with careful inspection of the mucosal surfaces. The scope was retroflexed for inspection of the gastric fundus and incisura. Findings and maneuvers are listed in impression below. The patient tolerated the procedure well. The scope was removed. There were no immediate complications. Findings:   Esophagus: normal; EG Junction was at 40 cm. NO erosions or nodules or webs or rings or evidence of Guaman's esophagus. Random biopsies were taken to check for NERD. There is NO hiatal hernia present. Stomach:  normal.      Duodenum: normal      IMPRESSION:  1. Normal EGD; no obvious lesions to explain the patient's symptoms. She likely has non-erosive GERD with or without Esophageal dysmotility. RECOMMENDATIONS:    1. Await path results, the patient will be contacted in 7-10 days with biopsy results. 2.  Continue current treatment regimen including PPI, H2RA (for GERD) and low dose tricyclic antidepressant (for esophageal spasms)  3.   Will consider Esophageal manometry and pH monitoring later if need be and prior to any surgical intervention for chronic GERD such as fundoplication.    - Resume previous meds and diet  - GI clinic f/u PRN   - Keep scheduled f/u appts with other MDs       The results were discussed with the patient and her , Dr. Garcia Fontanez. A copy of the images obtained were given to the patient.      Homero Casanova MD  8/5/2019  8:48 AM

## 2019-08-06 ENCOUNTER — TELEPHONE (OUTPATIENT)
Dept: NEUROSURGERY | Age: 54
End: 2019-08-06

## 2020-02-10 ENCOUNTER — OFFICE VISIT (OUTPATIENT)
Dept: INTERNAL MEDICINE | Age: 55
End: 2020-02-10
Payer: COMMERCIAL

## 2020-02-10 VITALS
HEIGHT: 67 IN | WEIGHT: 143 LBS | HEART RATE: 69 BPM | DIASTOLIC BLOOD PRESSURE: 76 MMHG | BODY MASS INDEX: 22.44 KG/M2 | SYSTOLIC BLOOD PRESSURE: 131 MMHG | OXYGEN SATURATION: 98 %

## 2020-02-10 DIAGNOSIS — R35.0 URINARY FREQUENCY: ICD-10-CM

## 2020-02-10 LAB
BILIRUBIN URINE: NEGATIVE
BLOOD, URINE: NEGATIVE
CLARITY: CLEAR
COLOR: YELLOW
GLUCOSE URINE: NEGATIVE MG/DL
KETONES, URINE: NEGATIVE MG/DL
LEUKOCYTE ESTERASE, URINE: NEGATIVE
NITRITE, URINE: NEGATIVE
PH UA: 7 (ref 5–8)
PROTEIN UA: NEGATIVE MG/DL
SPECIFIC GRAVITY UA: 1 (ref 1–1.03)
URINE REFLEX TO CULTURE: NORMAL
UROBILINOGEN, URINE: 0.2 E.U./DL

## 2020-02-10 PROCEDURE — 99386 PREV VISIT NEW AGE 40-64: CPT | Performed by: INTERNAL MEDICINE

## 2020-02-10 RX ORDER — LORATADINE 10 MG/1
10 CAPSULE, LIQUID FILLED ORAL DAILY
COMMUNITY

## 2020-02-10 RX ORDER — TIZANIDINE 4 MG/1
4 TABLET ORAL EVERY 6 HOURS PRN
COMMUNITY
End: 2021-01-26 | Stop reason: SDUPTHER

## 2020-02-10 RX ORDER — LANSOPRAZOLE 30 MG/1
30 CAPSULE, DELAYED RELEASE ORAL DAILY
COMMUNITY
End: 2020-11-30

## 2020-02-10 RX ORDER — ERGOCALCIFEROL 1.25 MG/1
50000 CAPSULE ORAL WEEKLY
COMMUNITY
End: 2020-07-22 | Stop reason: SDUPTHER

## 2020-02-10 RX ORDER — LEVOTHYROXINE SODIUM 0.07 MG/1
75 TABLET ORAL DAILY
COMMUNITY
End: 2020-04-28 | Stop reason: SDUPTHER

## 2020-02-10 ASSESSMENT — PATIENT HEALTH QUESTIONNAIRE - PHQ9
2. FEELING DOWN, DEPRESSED OR HOPELESS: 0
1. LITTLE INTEREST OR PLEASURE IN DOING THINGS: 0
SUM OF ALL RESPONSES TO PHQ9 QUESTIONS 1 & 2: 0
SUM OF ALL RESPONSES TO PHQ QUESTIONS 1-9: 0
SUM OF ALL RESPONSES TO PHQ QUESTIONS 1-9: 0

## 2020-02-10 NOTE — PROGRESS NOTES
Chief Complaint   Patient presents with    Established New Doctor       HPI: Amanda Lopez is a 49 y.o. female is here for establishment of care. She is a former patient of Dr. Cecille Holguin. Nevin Jesse, her chart has not yet been merged. Most of the information is coming from the patient today who is actually a very good historian. She is due for her annual physical exam.  She does have a history of hypothyroidism. She is had a history of a thyroid nodule that was removed in the past.  She was recently increased to 75 mcg of Synthroid. The increase occurred on February 4. We will check a TSH in about a month. She also has a history of acid reflux. She sees body grades. She has a history of esophageal spasms and is on lansoprazole. She does take Zanaflex as needed for chronic neck pain. This medication does seem to work well. She also has a history of a vitamin D deficiency. She has seen Dr. Sarina Moreno in the past for history of premature atrial contractions. She tried a beta-blocker in the past and did not feel well with this medication. She is no longer on any medications PACs. Currently, she is not having any more difficulty with it. Her family history is significant for sister with breast cancer that is diagnosed in her 45s. She does have a history of a breast biopsy in 2017, which was benign. Her left breast is tender. She has had a history of augmentation in the breast.  She has had an MRI, which was done in January 2018. Her cholesterol was 204 on her recent labs. Her HDL is 111. She periodically gets some hot flashes. She states that her periods are somewhat irregular. Occasionally, she does have some ringing in her ears. She has been told to see neurosurgery in the past.  She has some issues with some disc in her back and neck. She is been having neck pain. She states that sometimes if she sleeps a certain way, she has numbness and tingling in her arms. She has not had an MRI.   She is Forced sexual activity: None   Other Topics Concern    None   Social History Narrative    None      Family History   Problem Relation Age of Onset    Other Mother         brain bleed    Dementia Mother     Lung Cancer Father          54, lung cancer    Breast Cancer Sister          at 47, did not wake up after a nap    Heart Attack Sister     Other Other         brain aneurysm    Colon Cancer Other         Current Outpatient Medications   Medication Sig Dispense Refill    levothyroxine (SYNTHROID) 75 MCG tablet Take 75 mcg by mouth Daily      lansoprazole (PREVACID) 30 MG delayed release capsule Take 30 mg by mouth daily      vitamin D (ERGOCALCIFEROL) 1.25 MG (45189 UT) CAPS capsule Take 50,000 Units by mouth once a week      tiZANidine (ZANAFLEX) 4 MG tablet Take 4 mg by mouth every 6 hours as needed      loratadine (CLARITIN) 10 MG capsule Take 10 mg by mouth daily      Alum Hydroxide-Mag Carbonate (GAVISCON PO) Take by mouth       No current facility-administered medications for this visit. There is no problem list on file for this patient. Review of Systems   Constitutional: Negative for activity change, appetite change, chills, diaphoresis, fatigue, fever and unexpected weight change. HENT: Positive for tinnitus. Negative for congestion, ear pain, facial swelling, hearing loss, mouth sores, nosebleeds, postnasal drip, rhinorrhea, sinus pressure, sneezing, sore throat, trouble swallowing and voice change. Eyes: Negative for photophobia, pain, discharge, redness, itching and visual disturbance. Respiratory: Negative for cough, choking, chest tightness, shortness of breath and wheezing. Cardiovascular: Negative for chest pain, palpitations and leg swelling. Gastrointestinal: Negative for abdominal distention, abdominal pain, anal bleeding, blood in stool, constipation, diarrhea, nausea, rectal pain and vomiting.    Endocrine: Negative for cold discharge. Extraocular Movements: Extraocular movements intact. Conjunctiva/sclera: Conjunctivae normal.      Pupils: Pupils are equal, round, and reactive to light. Neck:      Musculoskeletal: Normal range of motion and neck supple. No neck rigidity or muscular tenderness. Thyroid: No thyromegaly. Vascular: No carotid bruit or JVD. Trachea: No tracheal deviation. Cardiovascular:      Rate and Rhythm: Normal rate and regular rhythm. Pulses: Normal pulses. Heart sounds: Normal heart sounds. No murmur. No friction rub. No gallop. Pulmonary:      Effort: Pulmonary effort is normal. No respiratory distress. Breath sounds: Normal breath sounds. No stridor. No wheezing or rales. Chest:      Chest wall: No tenderness. Abdominal:      General: Abdomen is flat. Bowel sounds are normal. There is no distension. Palpations: Abdomen is soft. There is no mass. Tenderness: There is no abdominal tenderness. There is no right CVA tenderness, left CVA tenderness, guarding or rebound. Hernia: No hernia is present. Musculoskeletal: Normal range of motion. General: No swelling, tenderness, deformity or signs of injury. Right lower leg: No edema. Left lower leg: No edema. Lymphadenopathy:      Cervical: No cervical adenopathy. Skin:     General: Skin is warm and dry. Capillary Refill: Capillary refill takes less than 2 seconds. Coloration: Skin is not jaundiced or pale. Findings: No erythema, lesion or rash. Neurological:      General: No focal deficit present. Mental Status: She is alert and oriented to person, place, and time. Mental status is at baseline. Cranial Nerves: No cranial nerve deficit. Sensory: No sensory deficit. Motor: No weakness or abnormal muscle tone. Coordination: Coordination normal.      Gait: Gait normal.      Deep Tendon Reflexes: Reflexes are normal and symmetric.  Reflexes normal. Psychiatric:         Mood and Affect: Mood normal.         Behavior: Behavior normal.         Thought Content: Thought content normal.         Judgment: Judgment normal.         1. Hypothyroidism, unspecified type    2. Colon cancer screening    3. Breast cancer screening    4. Cervicalgia    5. Chronic nonintractable headache, unspecified headache type    6. Urinary frequency        ASSESSMENT/PLAN:    71-year-old woman here to establish care    1. Health maintenance: Outside labs have been reviewed. Routine screening is as per HPI. We will get her set up for a Pap smear in the near future. 2.  Hypothyroidism: Continue Synthroid at current dose for now. Check TSH and T4 in a month. 3.  She is due for colon cancer screening. Will refer her to GI. 4.  Mammogram ordered    5. Neck pain with numbness and tingling to the right extremity. She has had x-rays in the past.  Will obtain a CT given the numbness and tingling that she is having. She is also having tinnitus. Sometimes she has a little bit of vertigo when she gets to tendinitis. She is concerned with possibility of an acoustic neuroma. CT of head has been ordered. Sometimes, the dizziness is associated with a headache. Continue Zanafex for the neck pain    6. Esophageal reflux: Continue Prevacid at current dose    7. Vitamin D deficiency: Check vitamin D level with next lab draw. Continue ergocalciferol    8. Urinary frequency: Check urinalysis    9. Environmental allergies: Continue Claritin at current dose          Erica Giraldo was seen today for established new doctor. Diagnoses and all orders for this visit:    Hypothyroidism, unspecified type  -     TSH without Reflex; Future  -     T4; Future    Colon cancer screening  -     Niurka Randolph MD, Gastroenterology, [place]    Breast cancer screening  -     YUSRA DIGITAL DIAGNOSTIC AUGMENTED BILATERAL;  Future    Cervicalgia  -     CT Cervical Spine W WO Contrast;

## 2020-02-10 NOTE — PATIENT INSTRUCTIONS
dose, do NOT take a double dose of medicine. Take your usual dose the next day. · Tell your doctor about all prescription, herbal, or over-the-counter products you take. · Take care of yourself. Eat a healthy diet, get enough sleep, and get regular exercise. When should you call for help? Call 911 anytime you think you may need emergency care. For example, call if:    · You passed out (lost consciousness).     · You have severe trouble breathing.     · You have a very slow heartbeat (less than 60 beats a minute).     · You have a low body temperature (95°F or below).    Call your doctor now or seek immediate medical care if:    · You feel tired, sluggish, or weak.     · You have trouble remembering things or concentrating.     · You do not begin to feel better 2 weeks after starting your medicine.    Watch closely for changes in your health, and be sure to contact your doctor if you have any problems. Where can you learn more? Go to https://Rockmelt.[employer]. org and sign in to your nanoMR account. Enter X327 in the [place] box to learn more about \"Hypothyroidism: Care Instructions. \"     If you do not have an account, please click on the \"Sign Up Now\" link. Current as of: July 28, 2019  Content Version: 12.3  © 9408-0192 Healthwise, [employer]. Care instructions adapted under license by [facility] If you have questions about a medical condition or this instruction, always ask your healthcare professional. [place] [address] any warranty or liability for your use of this information.

## 2020-02-12 ENCOUNTER — TELEPHONE (OUTPATIENT)
Dept: INTERNAL MEDICINE | Age: 55
End: 2020-02-12

## 2020-02-13 NOTE — TELEPHONE ENCOUNTER
She was told she needed oral contrast for CT head and neck.  She has since gotten the correct answer that they will not be doing oral contrast.

## 2020-02-14 ASSESSMENT — ENCOUNTER SYMPTOMS
SHORTNESS OF BREATH: 0
ABDOMINAL PAIN: 0
SORE THROAT: 0
COLOR CHANGE: 0
CHEST TIGHTNESS: 0
BLOOD IN STOOL: 0
BACK PAIN: 0
EYE PAIN: 0
RHINORRHEA: 0
PHOTOPHOBIA: 0
COUGH: 0
EYE DISCHARGE: 0
EYE REDNESS: 0
SINUS PRESSURE: 0
VOICE CHANGE: 0
EYE ITCHING: 0
FACIAL SWELLING: 0
VOMITING: 0
WHEEZING: 0
CONSTIPATION: 0
NAUSEA: 0
CHOKING: 0
ABDOMINAL DISTENTION: 0
DIARRHEA: 0
TROUBLE SWALLOWING: 0
ANAL BLEEDING: 0
RECTAL PAIN: 0

## 2020-02-17 ENCOUNTER — HOSPITAL ENCOUNTER (OUTPATIENT)
Dept: CT IMAGING | Age: 55
Discharge: HOME | End: 2020-02-17
Payer: COMMERCIAL

## 2020-02-17 ENCOUNTER — TELEPHONE (OUTPATIENT)
Dept: INTERNAL MEDICINE | Age: 55
End: 2020-02-17

## 2020-02-17 PROCEDURE — 6360000004 HC RX CONTRAST MEDICATION: Performed by: INTERNAL MEDICINE

## 2020-02-17 PROCEDURE — 70470 CT HEAD/BRAIN W/O & W/DYE: CPT

## 2020-02-17 PROCEDURE — 72127 CT NECK SPINE W/O & W/DYE: CPT

## 2020-02-17 RX ADMIN — IOPAMIDOL 60 ML: 755 INJECTION, SOLUTION INTRAVENOUS at 15:45

## 2020-03-11 ENCOUNTER — TELEPHONE (OUTPATIENT)
Dept: INTERNAL MEDICINE | Age: 55
End: 2020-03-11

## 2020-03-11 ENCOUNTER — HOSPITAL ENCOUNTER (OUTPATIENT)
Dept: WOMENS IMAGING | Age: 55
Discharge: HOME | End: 2020-03-11
Payer: COMMERCIAL

## 2020-03-11 PROCEDURE — 77063 BREAST TOMOSYNTHESIS BI: CPT

## 2020-03-11 NOTE — TELEPHONE ENCOUNTER
Result Notes for YUSRA DIGITAL SCREEN BILATERAL     Notes recorded by Michael Booker on 3/11/2020 at 1:59 PM CDT  I called and left a message that her mammogram was normal.  ------    Notes recorded by Rosemarie Way MD on 3/11/2020 at 1:12 PM CDT  Normal mammogram

## 2020-03-13 ENCOUNTER — TELEPHONE (OUTPATIENT)
Dept: NEUROSURGERY | Age: 55
End: 2020-03-13

## 2020-03-23 ENCOUNTER — TELEPHONE (OUTPATIENT)
Dept: INTERNAL MEDICINE | Age: 55
End: 2020-03-23

## 2020-03-23 RX ORDER — BUSPIRONE HYDROCHLORIDE 5 MG/1
5 TABLET ORAL 3 TIMES DAILY PRN
Qty: 30 TABLET | Refills: 0 | Status: SHIPPED | OUTPATIENT
Start: 2020-03-23 | End: 2020-06-08 | Stop reason: ALTCHOICE

## 2020-03-23 RX ORDER — AZITHROMYCIN 250 MG/1
TABLET, FILM COATED ORAL
Qty: 1 PACKET | Refills: 0 | Status: SHIPPED | OUTPATIENT
Start: 2020-03-23 | End: 2020-03-30

## 2020-03-23 RX ORDER — CLONAZEPAM 0.5 MG/1
0.25 TABLET ORAL 2 TIMES DAILY PRN
Qty: 10 TABLET | Refills: 0 | Status: SHIPPED | OUTPATIENT
Start: 2020-03-23 | End: 2020-06-08 | Stop reason: ALTCHOICE

## 2020-03-23 NOTE — TELEPHONE ENCOUNTER
Pt voiced understanding.    Requested Prescriptions     Pending Prescriptions Disp Refills    azithromycin (ZITHROMAX) 250 MG tablet 1 packet 0     Sig: Take 2 tabs (500 mg) on Day 1, and take 1 tab (250 mg) on days 2 through 5.    busPIRone (BUSPAR) 5 MG tablet 30 tablet 0     Sig: Take 1 tablet by mouth 3 times daily as needed (Anxiety)

## 2020-03-23 NOTE — TELEPHONE ENCOUNTER
They initially screen them while they are in the car. She can try low dose clonazepam 0.25 mg po BID prn. Call in her 10 of these.

## 2020-04-28 DIAGNOSIS — E03.9 HYPOTHYROIDISM, UNSPECIFIED TYPE: ICD-10-CM

## 2020-04-28 LAB
T4 TOTAL: 6.3 UG/DL (ref 4.5–11.7)
TSH SERPL DL<=0.05 MIU/L-ACNC: 2.88 UIU/ML (ref 0.27–4.2)

## 2020-04-28 RX ORDER — LEVOTHYROXINE SODIUM 0.07 MG/1
75 TABLET ORAL DAILY
Qty: 90 TABLET | Refills: 3 | Status: SHIPPED | OUTPATIENT
Start: 2020-04-28 | End: 2021-04-20

## 2020-06-08 ENCOUNTER — OFFICE VISIT (OUTPATIENT)
Dept: INTERNAL MEDICINE | Age: 55
End: 2020-06-08
Payer: COMMERCIAL

## 2020-06-08 VITALS
SYSTOLIC BLOOD PRESSURE: 120 MMHG | WEIGHT: 140 LBS | BODY MASS INDEX: 21.97 KG/M2 | HEIGHT: 67 IN | HEART RATE: 60 BPM | DIASTOLIC BLOOD PRESSURE: 70 MMHG

## 2020-06-08 PROCEDURE — 99396 PREV VISIT EST AGE 40-64: CPT | Performed by: INTERNAL MEDICINE

## 2020-06-08 RX ORDER — CONJUGATED ESTROGENS 0.62 MG/G
0.5 CREAM VAGINAL DAILY
Qty: 1 TUBE | Refills: 3 | Status: SHIPPED | OUTPATIENT
Start: 2020-06-08

## 2020-06-08 ASSESSMENT — ENCOUNTER SYMPTOMS
CONSTIPATION: 0
BLOOD IN STOOL: 0
NAUSEA: 0
COUGH: 0
SINUS PRESSURE: 0
BACK PAIN: 0
VOMITING: 0
ABDOMINAL DISTENTION: 0
TROUBLE SWALLOWING: 0
SORE THROAT: 0
VOICE CHANGE: 0
COLOR CHANGE: 0
FACIAL SWELLING: 0
EYE DISCHARGE: 0
EYE REDNESS: 0
WHEEZING: 0
RHINORRHEA: 0
RECTAL PAIN: 0
PHOTOPHOBIA: 0
ABDOMINAL PAIN: 0
CHOKING: 0
CHEST TIGHTNESS: 0
SHORTNESS OF BREATH: 0
EYE PAIN: 0
EYE ITCHING: 0
ANAL BLEEDING: 0
DIARRHEA: 0

## 2020-06-08 NOTE — PATIENT INSTRUCTIONS
Patient Education        A Healthy Lifestyle: Care Instructions  Your Care Instructions     A healthy lifestyle can help you feel good, stay at a healthy weight, and have plenty of energy for both work and play. A healthy lifestyle is something you can share with your whole family. A healthy lifestyle also can lower your risk for serious health problems, such as high blood pressure, heart disease, and diabetes. You can follow a few steps listed below to improve your health and the health of your family. Follow-up care is a key part of your treatment and safety. Be sure to make and go to all appointments, and call your doctor if you are having problems. It's also a good idea to know your test results and keep a list of the medicines you take. How can you care for yourself at home? · Do not eat too much sugar, fat, or fast foods. You can still have dessert and treats now and then. The goal is moderation. · Start small to improve your eating habits. Pay attention to portion sizes, drink less juice and soda pop, and eat more fruits and vegetables. ? Eat a healthy amount of food. A 3-ounce serving of meat, for example, is about the size of a deck of cards. Fill the rest of your plate with vegetables and whole grains. ? Limit the amount of soda and sports drinks you have every day. Drink more water when you are thirsty. ? Eat at least 5 servings of fruits and vegetables every day. It may seem like a lot, but it is not hard to reach this goal. A serving or helping is 1 piece of fruit, 1 cup of vegetables, or 2 cups of leafy, raw vegetables. Have an apple or some carrot sticks as an afternoon snack instead of a candy bar. Try to have fruits and/or vegetables at every meal.  · Make exercise part of your daily routine. You may want to start with simple activities, such as walking, bicycling, or slow swimming. Try to be active 30 to 60 minutes every day. You do not need to do all 30 to 60 minutes all at once.  For example, you can exercise 3 times a day for 10 or 20 minutes. Moderate exercise is safe for most people, but it is always a good idea to talk to your doctor before starting an exercise program.  · Keep moving. Sultana Shultz the lawn, work in the garden, or MobileWebsites. Take the stairs instead of the elevator at work. · If you smoke, quit. People who smoke have an increased risk for heart attack, stroke, cancer, and other lung illnesses. Quitting is hard, but there are ways to boost your chance of quitting tobacco for good. ? Use nicotine gum, patches, or lozenges. ? Ask your doctor about stop-smoking programs and medicines. ? Keep trying. In addition to reducing your risk of diseases in the future, you will notice some benefits soon after you stop using tobacco. If you have shortness of breath or asthma symptoms, they will likely get better within a few weeks after you quit. · Limit how much alcohol you drink. Moderate amounts of alcohol (up to 2 drinks a day for men, 1 drink a day for women) are okay. But drinking too much can lead to liver problems, high blood pressure, and other health problems. Family health  If you have a family, there are many things you can do together to improve your health. · Eat meals together as a family as often as possible. · Eat healthy foods. This includes fruits, vegetables, lean meats and dairy, and whole grains. · Include your family in your fitness plan. Most people think of activities such as jogging or tennis as the way to fitness, but there are many ways you and your family can be more active. Anything that makes you breathe hard and gets your heart pumping is exercise. Here are some tips:  ? Walk to do errands or to take your child to school or the bus.  ? Go for a family bike ride after dinner instead of watching TV. Where can you learn more? Go to https://chmaggieb.healthScanDigitalpartners. org and sign in to your Octapoly account.  Enter E510 in the [facility]

## 2020-06-08 NOTE — PROGRESS NOTES
UPPER GASTROINTESTINAL ENDOSCOPY  2015        UPPER GASTROINTESTINAL ENDOSCOPY N/A 2019    Dr Bautista Ba has non-erosive GERD with or without Esophageal dysmotility      Social History     Socioeconomic History    Marital status:      Spouse name: Not on file    Number of children: Not on file    Years of education: Not on file    Highest education level: Not on file   Occupational History    Not on file   Social Needs    Financial resource strain: Not on file    Food insecurity     Worry: Not on file     Inability: Not on file    Transportation needs     Medical: Not on file     Non-medical: Not on file   Tobacco Use    Smoking status: Never Smoker    Smokeless tobacco: Never Used   Substance and Sexual Activity    Alcohol use: Yes     Comment: couple times per week    Drug use: No    Sexual activity: Yes     Partners: Male     Birth control/protection: Surgical   Lifestyle    Physical activity     Days per week: Not on file     Minutes per session: Not on file    Stress: Not on file   Relationships    Social connections     Talks on phone: Not on file     Gets together: Not on file     Attends Taoism service: Not on file     Active member of club or organization: Not on file     Attends meetings of clubs or organizations: Not on file     Relationship status: Not on file    Intimate partner violence     Fear of current or ex partner: Not on file     Emotionally abused: Not on file     Physically abused: Not on file     Forced sexual activity: Not on file   Other Topics Concern    Not on file   Social History Narrative    ** Merged History Encounter **           Family History   Problem Relation Age of Onset    Other Mother         brain bleed    Dementia Mother     Lung Cancer Father          54, lung cancer    Breast Cancer Sister          at 47, did not wake up after a nap    Heart Attack Sister     Other Other         brain aneurysm  Colon Cancer Other     High Blood Pressure Mother     Cancer Father         Lung-Smoker    Breast Cancer Sister 50    Cancer Maternal Grandfather         rectal cancer    Colon Cancer Maternal Grandfather     Colon Polyps Maternal Grandfather     Esophageal Cancer Neg Hx     Liver Cancer Neg Hx     Liver Disease Neg Hx     Rectal Cancer Neg Hx     Stomach Cancer Neg Hx         Current Outpatient Medications   Medication Sig Dispense Refill    levothyroxine (SYNTHROID) 75 MCG tablet Take 1 tablet by mouth Daily 90 tablet 3    lansoprazole (PREVACID) 30 MG delayed release capsule Take 30 mg by mouth daily      vitamin D (ERGOCALCIFEROL) 1.25 MG (58032 UT) CAPS capsule Take 50,000 Units by mouth once a week      tiZANidine (ZANAFLEX) 4 MG tablet Take 4 mg by mouth every 6 hours as needed      loratadine (CLARITIN) 10 MG capsule Take 10 mg by mouth daily      Alum Hydroxide-Mag Carbonate (GAVISCON PO) Take by mouth      mometasone (NASONEX) 50 MCG/ACT nasal spray 2 sprays by Each Nostril route as needed       No current facility-administered medications for this visit. Patient Active Problem List   Diagnosis    LLQ abdominal pain    Chronic constipation    Epigastric pressure    Heartburn    Indigestion    Acid reflux    Esophageal spasm    Hemorrhoids    Chest pain    Breast tenderness in female    Breast mass, left    History of breast augmentation    Iron deficiency anemia secondary to inadequate dietary iron intake        Review of Systems   Constitutional: Negative for activity change, appetite change, chills, diaphoresis, fatigue, fever and unexpected weight change. HENT: Negative for congestion, ear pain, facial swelling, hearing loss, mouth sores, nosebleeds, postnasal drip, rhinorrhea, sinus pressure, sneezing, sore throat, tinnitus, trouble swallowing and voice change. Eyes: Negative for photophobia, pain, discharge, redness, itching and visual disturbance. ear normal. There is no impacted cerumen. Nose: Nose normal. No congestion or rhinorrhea. Mouth/Throat:      Mouth: Mucous membranes are moist.      Pharynx: Oropharynx is clear. No oropharyngeal exudate or posterior oropharyngeal erythema. Eyes:      General: No scleral icterus. Right eye: No discharge. Left eye: No discharge. Extraocular Movements: Extraocular movements intact. Conjunctiva/sclera: Conjunctivae normal.      Pupils: Pupils are equal, round, and reactive to light. Neck:      Musculoskeletal: Normal range of motion and neck supple. No neck rigidity or muscular tenderness. Thyroid: No thyromegaly. Vascular: No carotid bruit or JVD. Trachea: No tracheal deviation. Cardiovascular:      Rate and Rhythm: Normal rate and regular rhythm. Pulses: Normal pulses. Heart sounds: Normal heart sounds. No murmur. No friction rub. No gallop. Pulmonary:      Effort: Pulmonary effort is normal. No respiratory distress. Breath sounds: Normal breath sounds. No stridor. No wheezing or rales. Chest:      Chest wall: No tenderness. Abdominal:      General: Abdomen is flat. Bowel sounds are normal. There is no distension. Palpations: Abdomen is soft. There is no mass. Tenderness: There is no abdominal tenderness. There is no right CVA tenderness, left CVA tenderness, guarding or rebound. Hernia: No hernia is present. There is no hernia in the right inguinal area or left inguinal area. Genitourinary:     Labia:         Right: No rash, tenderness, lesion or injury. Left: No rash, tenderness, lesion or injury. Urethra: No prolapse, urethral pain, urethral swelling or urethral lesion. Vagina: Normal.      Cervix: Dilated. No cervical motion tenderness, discharge, friability, lesion, erythema, cervical bleeding or eversion. Uterus: Normal. Not deviated, not enlarged, not fixed, not tender and no uterine prolapse. Adnexa: Right adnexa normal and left adnexa normal.      Comments: Breasts: breasts appear normal, no suspicious masses, no skin or nipple changes or axillary nodes. Musculoskeletal: Normal range of motion. General: No swelling, tenderness, deformity or signs of injury. Right lower leg: No edema. Left lower leg: No edema. Lymphadenopathy:      Cervical: No cervical adenopathy. Lower Body: No right inguinal adenopathy. No left inguinal adenopathy. Skin:     General: Skin is warm and dry. Capillary Refill: Capillary refill takes less than 2 seconds. Coloration: Skin is not jaundiced or pale. Findings: No erythema, lesion or rash. Neurological:      General: No focal deficit present. Mental Status: She is alert and oriented to person, place, and time. Mental status is at baseline. Cranial Nerves: No cranial nerve deficit. Sensory: No sensory deficit. Motor: No weakness or abnormal muscle tone. Coordination: Coordination normal.      Gait: Gait normal.      Deep Tendon Reflexes: Reflexes are normal and symmetric. Reflexes normal.   Psychiatric:         Mood and Affect: Mood normal.         Behavior: Behavior normal.         Thought Content: Thought content normal.         Judgment: Judgment normal.         No diagnosis found. ASSESSMENT/PLAN:    60-year-old woman here for her annual physical exam    1. Health maintenance: Routine screenings as per [facility]. Labs were reviewed from March with patient. 2.  Vaginal dryness/hormonal changes: Try low-dose Premarin cream    3. Acid reflux: Stable on Prevacid    4. Vitamin D deficiency: Continue vitamin D supplementation    5. Neck pain: Zanaflex as needed    6. Environmental allergies: Continue Claritin    7. Pelvic pain: Ultrasound ordered      There are no diagnoses linked to this encounter. No follow-ups on file.      No orders of the defined types were placed in this

## 2020-06-09 DIAGNOSIS — Z12.4 SCREENING FOR CERVICAL CANCER: ICD-10-CM

## 2020-06-09 DIAGNOSIS — Z12.4 PAP SMEAR FOR CERVICAL CANCER SCREENING: ICD-10-CM

## 2020-07-10 ENCOUNTER — HOSPITAL ENCOUNTER (OUTPATIENT)
Dept: ULTRASOUND IMAGING | Age: 55
Discharge: HOME | End: 2020-07-10
Payer: COMMERCIAL

## 2020-07-10 PROCEDURE — 76830 TRANSVAGINAL US NON-OB: CPT

## 2020-07-22 NOTE — TELEPHONE ENCOUNTER
Marci Turcios called requesting a refill of the below medication which has been pended for you:     Requested Prescriptions     Pending Prescriptions Disp Refills    vitamin D (ERGOCALCIFEROL) 1.25 MG (22472 UT) CAPS capsule 5 capsule 2     Sig: Take 1 capsule by mouth once a week       Last Appointment Date: 6/8/2020  Next Appointment Date: 12/8/2020    Allergies   Allergen Reactions    Celebrex [Celecoxib]

## 2020-07-23 RX ORDER — ERGOCALCIFEROL 1.25 MG/1
50000 CAPSULE ORAL WEEKLY
Qty: 5 CAPSULE | Refills: 2 | Status: SHIPPED | OUTPATIENT
Start: 2020-07-23 | End: 2021-01-18

## 2020-10-29 ENCOUNTER — TELEPHONE (OUTPATIENT)
Dept: INTERNAL MEDICINE | Age: 55
End: 2020-10-29

## 2020-10-29 NOTE — TELEPHONE ENCOUNTER
Pt c/o dizziness, fatigue, and hot flashes. She stopped the Premarin because it was irritating her and she didn't like to wear a pantyliner all day. She would like to get lab. Lab is ordered for next month. She would like to get that done now and a pill form of Premarin. Please advise.

## 2020-10-30 ENCOUNTER — NURSE ONLY (OUTPATIENT)
Dept: INTERNAL MEDICINE | Age: 55
End: 2020-10-30
Payer: COMMERCIAL

## 2020-10-30 DIAGNOSIS — R53.83 FATIGUE, UNSPECIFIED TYPE: ICD-10-CM

## 2020-10-30 DIAGNOSIS — E03.9 HYPOTHYROIDISM, UNSPECIFIED TYPE: ICD-10-CM

## 2020-10-30 DIAGNOSIS — E55.9 VITAMIN D DEFICIENCY: ICD-10-CM

## 2020-10-30 LAB
ALBUMIN SERPL-MCNC: 4.6 G/DL (ref 3.5–5.2)
ALP BLD-CCNC: 77 U/L (ref 35–104)
ALT SERPL-CCNC: 13 U/L (ref 5–33)
ANION GAP SERPL CALCULATED.3IONS-SCNC: 11 MMOL/L (ref 7–19)
AST SERPL-CCNC: 18 U/L (ref 5–32)
BASOPHILS ABSOLUTE: 0 K/UL (ref 0–0.2)
BASOPHILS RELATIVE PERCENT: 0.6 % (ref 0–1)
BILIRUB SERPL-MCNC: 0.4 MG/DL (ref 0.2–1.2)
BUN BLDV-MCNC: 14 MG/DL (ref 6–20)
CALCIUM SERPL-MCNC: 9.2 MG/DL (ref 8.6–10)
CHLORIDE BLD-SCNC: 102 MMOL/L (ref 98–111)
CO2: 26 MMOL/L (ref 22–29)
CREAT SERPL-MCNC: 0.6 MG/DL (ref 0.5–0.9)
EOSINOPHILS ABSOLUTE: 0.1 K/UL (ref 0–0.6)
EOSINOPHILS RELATIVE PERCENT: 2.3 % (ref 0–5)
GFR AFRICAN AMERICAN: >59
GFR NON-AFRICAN AMERICAN: >60
GLUCOSE BLD-MCNC: 92 MG/DL (ref 74–109)
HCT VFR BLD CALC: 46.1 % (ref 37–47)
HEMOGLOBIN: 14.3 G/DL (ref 12–16)
IMMATURE GRANULOCYTES #: 0 K/UL
LYMPHOCYTES ABSOLUTE: 1.7 K/UL (ref 1.1–4.5)
LYMPHOCYTES RELATIVE PERCENT: 36.7 % (ref 20–40)
MCH RBC QN AUTO: 27.9 PG (ref 27–31)
MCHC RBC AUTO-ENTMCNC: 31 G/DL (ref 33–37)
MCV RBC AUTO: 90 FL (ref 81–99)
MONOCYTES ABSOLUTE: 0.5 K/UL (ref 0–0.9)
MONOCYTES RELATIVE PERCENT: 9.7 % (ref 0–10)
NEUTROPHILS ABSOLUTE: 2.4 K/UL (ref 1.5–7.5)
NEUTROPHILS RELATIVE PERCENT: 50.5 % (ref 50–65)
PDW BLD-RTO: 14.6 % (ref 11.5–14.5)
PLATELET # BLD: 225 K/UL (ref 130–400)
PMV BLD AUTO: 10.8 FL (ref 9.4–12.3)
POTASSIUM SERPL-SCNC: 4.3 MMOL/L (ref 3.5–5)
RBC # BLD: 5.12 M/UL (ref 4.2–5.4)
SODIUM BLD-SCNC: 139 MMOL/L (ref 136–145)
T4 FREE: 1.26 NG/DL (ref 0.93–1.7)
TOTAL PROTEIN: 7.1 G/DL (ref 6.6–8.7)
TSH SERPL DL<=0.05 MIU/L-ACNC: 2.51 UIU/ML (ref 0.27–4.2)
VITAMIN B-12: 357 PG/ML (ref 211–946)
VITAMIN D 25-HYDROXY: 36.6 NG/ML
WBC # BLD: 4.7 K/UL (ref 4.8–10.8)

## 2020-10-30 PROCEDURE — 90471 IMMUNIZATION ADMIN: CPT | Performed by: INTERNAL MEDICINE

## 2020-10-30 PROCEDURE — 90686 IIV4 VACC NO PRSV 0.5 ML IM: CPT | Performed by: INTERNAL MEDICINE

## 2020-10-30 NOTE — PATIENT INSTRUCTIONS
Flu vaccine given in (L) deltoid per Dr. Nima Tanner' orders. Pt tolerated well. [facility] #[id number] LOT #[id number] EXP 6/30/21.

## 2020-11-30 RX ORDER — LANSOPRAZOLE 30 MG/1
CAPSULE, DELAYED RELEASE ORAL
Qty: 180 CAPSULE | Refills: 3 | Status: SHIPPED | OUTPATIENT
Start: 2020-11-30 | End: 2022-04-11

## 2020-11-30 NOTE — TELEPHONE ENCOUNTER
Lewis Brewster called requesting a refill of the below medication which has been pended for you:     Requested Prescriptions     Pending Prescriptions Disp Refills    lansoprazole (PREVACID) 30 MG delayed release capsule [Pharmacy Med Name: LANSOPRAZOLE 30MG CPDR] 180 capsule 3     Sig: TAKE ONE CAPSULE TWO TIMES A DAY       Last Appointment Date: 6/8/2020  Next Appointment Date: 12/8/2020    Allergies   Allergen Reactions    Celebrex [Celecoxib]

## 2020-12-07 ENCOUNTER — TELEPHONE (OUTPATIENT)
Dept: INTERNAL MEDICINE | Age: 55
End: 2020-12-07

## 2020-12-07 NOTE — TELEPHONE ENCOUNTER
Pt thinks she may have COVID. She was around family that probably have Sasha. She is at the ER in [place] with chest pain and pressure. She says her chest feels really full. She has had some pain go into her back. She is coughing up yellow mucous, HA, fatigue, nausea, and fever of 100. She said the ER doctor said her WBC and PLT counts were low, but he didn't have anything to compare them to. I gave the pt verbal results of her last 2 CBCs.

## 2020-12-08 ENCOUNTER — TELEPHONE (OUTPATIENT)
Dept: INTERNAL MEDICINE | Age: 55
End: 2020-12-08

## 2020-12-08 RX ORDER — DOXYCYCLINE HYCLATE 100 MG
100 TABLET ORAL 2 TIMES DAILY
Qty: 20 TABLET | Refills: 0 | Status: SHIPPED | OUTPATIENT
Start: 2020-12-08 | End: 2020-12-18

## 2020-12-08 RX ORDER — METHYLPREDNISOLONE 4 MG/1
TABLET ORAL
Qty: 1 KIT | Refills: 0 | Status: SHIPPED | OUTPATIENT
Start: 2020-12-08 | End: 2020-12-14

## 2020-12-08 NOTE — TELEPHONE ENCOUNTER
Pt did test positive for COVID. She is in [place]. She was told to take Tylenol. She just started coughing up some yellow mucous, sore throat, nasal congestion, dyspnea, body ache, temp of 99.1, dizzy, and fatigue. [address] [facility], [facility] [address]. Please advise.

## 2021-01-18 RX ORDER — ERGOCALCIFEROL 1.25 MG/1
50000 CAPSULE ORAL WEEKLY
Qty: 12 CAPSULE | Refills: 3 | Status: SHIPPED | OUTPATIENT
Start: 2021-01-18 | End: 2022-06-10

## 2021-01-18 NOTE — TELEPHONE ENCOUNTER
Diane George called requesting a refill of the below medication which has been pended for you:     Requested Prescriptions     Pending Prescriptions Disp Refills    vitamin D (ERGOCALCIFEROL) 1.25 MG (08513 UT) CAPS capsule [Pharmacy Med Name: VITAMIN D (ERGOCALCIFE 1.25 MG CAPS] 12 capsule 3     Sig: TAKE 1 CAPSULE BY MOUTH ONCE A WEEK       Last Appointment Date: 6/8/2020  Next Appointment Date: 1/26/2021    Allergies   Allergen Reactions    Celebrex [Celecoxib]

## 2021-01-26 ENCOUNTER — OFFICE VISIT (OUTPATIENT)
Dept: INTERNAL MEDICINE | Age: 56
End: 2021-01-26
Payer: COMMERCIAL

## 2021-01-26 ENCOUNTER — TELEPHONE (OUTPATIENT)
Dept: UROLOGY | Age: 56
End: 2021-01-26

## 2021-01-26 VITALS
WEIGHT: 142.4 LBS | BODY MASS INDEX: 22.35 KG/M2 | OXYGEN SATURATION: 98 % | HEIGHT: 67 IN | DIASTOLIC BLOOD PRESSURE: 78 MMHG | RESPIRATION RATE: 18 BRPM | HEART RATE: 68 BPM | SYSTOLIC BLOOD PRESSURE: 122 MMHG

## 2021-01-26 DIAGNOSIS — Z78.0 MENOPAUSE: ICD-10-CM

## 2021-01-26 DIAGNOSIS — N39.3 STRESS INCONTINENCE OF URINE: ICD-10-CM

## 2021-01-26 DIAGNOSIS — E55.9 VITAMIN D DEFICIENCY: ICD-10-CM

## 2021-01-26 DIAGNOSIS — Z91.09 ENVIRONMENTAL ALLERGIES: ICD-10-CM

## 2021-01-26 DIAGNOSIS — E03.9 HYPOTHYROIDISM, UNSPECIFIED TYPE: Primary | ICD-10-CM

## 2021-01-26 DIAGNOSIS — K21.9 GASTROESOPHAGEAL REFLUX DISEASE WITHOUT ESOPHAGITIS: ICD-10-CM

## 2021-01-26 PROCEDURE — 99214 OFFICE O/P EST MOD 30 MIN: CPT | Performed by: INTERNAL MEDICINE

## 2021-01-26 RX ORDER — TIZANIDINE 4 MG/1
4 TABLET ORAL EVERY 6 HOURS PRN
Qty: 60 TABLET | Refills: 0 | Status: SHIPPED | OUTPATIENT
Start: 2021-01-26 | End: 2022-11-04 | Stop reason: SDUPTHER

## 2021-01-26 SDOH — ECONOMIC STABILITY: TRANSPORTATION INSECURITY
IN THE PAST 12 MONTHS, HAS LACK OF TRANSPORTATION KEPT YOU FROM MEETINGS, WORK, OR FROM GETTING THINGS NEEDED FOR DAILY LIVING?: NO

## 2021-01-26 SDOH — ECONOMIC STABILITY: FOOD INSECURITY: WITHIN THE PAST 12 MONTHS, YOU WORRIED THAT YOUR FOOD WOULD RUN OUT BEFORE YOU GOT MONEY TO BUY MORE.: NEVER TRUE

## 2021-01-26 SDOH — ECONOMIC STABILITY: TRANSPORTATION INSECURITY
IN THE PAST 12 MONTHS, HAS THE LACK OF TRANSPORTATION KEPT YOU FROM MEDICAL APPOINTMENTS OR FROM GETTING MEDICATIONS?: NO

## 2021-01-26 ASSESSMENT — PATIENT HEALTH QUESTIONNAIRE - PHQ9
1. LITTLE INTEREST OR PLEASURE IN DOING THINGS: 0
2. FEELING DOWN, DEPRESSED OR HOPELESS: 0
SUM OF ALL RESPONSES TO PHQ9 QUESTIONS 1 & 2: 0
SUM OF ALL RESPONSES TO PHQ QUESTIONS 1-9: 0

## 2021-01-26 NOTE — PATIENT INSTRUCTIONS
about stop-smoking programs and medicines. These can increase your chances of quitting for good. Where can you learn more? Go to [email or web address] and sign in to your Stumpwise account. Enter X339 in the [facility] box to learn more about \"Kegel Exercises: Care Instructions. \"     If you do not have an account, please click on the \"Sign Up Now\" link. Current as of: June 29, 2020               Content Version: 12.6  © 2006-2020 [employer]. Care instructions adapted under license by [facility] [facility] ([facility]). If you have questions about a medical condition or this instruction, always ask your healthcare professional. [address] any warranty or liability for your use of this information. Patient Education        mirabegron  Pronunciation:  SCOTT srivastava  Brand:  Myrbetriq  What is the most important information I should know about mirabegron? You should not use mirabegron if you have uncontrolled hypertension (high blood pressure). What is mirabegron? Mirabegron is used to treat overactive bladder with symptoms of frequent or urgent urination and urinary incontinence. Mirabegron is sometimes used together with another medicine called solifenacin (Vesicare). Mirabegron may also be used for purposes not listed in this medication guide. What should I discuss with my healthcare provider before taking mirabegron? You should not use mirabegron if you are allergic to it, or if you have uncontrolled hypertension (high blood pressure). Tell your doctor if you have ever had:  · high blood pressure;  · bladder obstruction or other urination problems;  · trouble emptying your bladder (very little urine or a weak stream of urine);  · kidney disease; or  · liver disease. It is not known whether this medicine will harm an unborn baby. Tell your doctor if you are pregnant or plan to become pregnant.   It may not be safe to breastfeed while using this medicine. Ask your doctor about any risk. Mirabegron is not approved for use by anyone younger than 25years old. How should I take mirabegron? Follow all directions on your prescription label and read all medication guides or instruction sheets. Use the medicine exactly as directed. Take this medicine with a full glass of water. If you take mirabegron with solifenacin, take both medicines at the same time each day. You may take these medicines with or without food. Swallow the tablet whole and do not crush, chew, or break it. Your blood pressure will need to be checked often. It may take up to 8 weeks before your symptoms improve. Keep using the medication as directed and tell your doctor if your symptoms do not improve. Store at room temperature away from moisture and heat. Keep the bottle closed when not in use. What happens if I miss a dose? Skip the missed dose and use your next dose at the regular time. Do not use two doses at one time. What happens if I overdose? Seek emergency medical attention or call the Poison Help line at 1-485.155.8129. What should I avoid while taking mirabegron? Follow your doctor's instructions about any restrictions on food, beverages, or activity. What are the possible side effects of mirabegron? Get emergency medical help if you have signs of an allergic reaction: hives; difficult breathing; swelling of your face, lips, tongue, or throat. Stop using mirabegron and call your doctor at once if you have:  · fast or pounding heartbeats;  · pain or burning when you urinate;  · painful or difficult urination; or  · dangerously high blood pressure --severe headache, blurred vision, pounding in your neck or ears.   Common side effects may include:  · increased blood pressure;  · headache, dizziness;  · back pain;  · constipation;  · cold symptoms or flu symptoms (stuffy nose, sinus pain, sore throat, general ill feeling); or  · (when taken with solifenacin) dry mouth, constipation, fast heartbeats. This is not a complete list of side effects and others may occur. Call your doctor for medical advice about side effects. You may report side effects to FDA at 1-510-HPH-7435. What other drugs will affect mirabegron? Tell your doctor about all your other medicines, especially:  · digoxin;  · flecainide;  · propafenone;  · solifenacin; or  · thioridazine. This list is not complete. Other drugs may affect mirabegron, including prescription and over-the-counter medicines, vitamins, and herbal products. Not all possible drug interactions are listed here. Where can I get more information? Your pharmacist can provide more information about mirabegron. Remember, keep this and all other medicines out of the reach of children, never share your medicines with others, and use this medication only for the indication prescribed. Every effort has been made to ensure that the information provided by [address] is accurate, up-to-date, and complete, but no guarantee is made to that effect. Drug information contained herein may be time sensitive. [employer] information has been compiled for use by healthcare practitioners and consumers in the [place] and therefore [employer] does not warrant that uses outside of the [place] are appropriate, unless specifically indicated otherwise. [facility] drug information does not endorse drugs, diagnose patients or recommend therapy. [employer] drug information is an informational resource designed to assist licensed healthcare practitioners in caring for their patients and/or to serve consumers viewing this service as a supplement to, and not a substitute for, the expertise, skill, knowledge and judgment of healthcare practitioners. The absence of a warning for a given drug or drug combination in no way should be construed to indicate that the drug or drug combination is safe, effective or appropriate for any given patient.  [employer] does not assume any responsibility for any aspect of healthcare administered with the aid of information [facility] provides. The information contained herein is not intended to cover all possible uses, directions, precautions, warnings, drug interactions, allergic reactions, or adverse effects. If you have questions about the drugs you are taking, check with your doctor, nurse or pharmacist.  Copyright 3034-9722 [address] [place] Version: 3.02. Revision date: 4/30/2019. Care instructions adapted under license by [employer]. If you have questions about a medical condition or this instruction, always ask your healthcare professional. [place] [address] any warranty or liability for your use of this information. Urge Incontinence in Women: Care Instructions  Your Care Instructions     Urge incontinence occurs when the need to urinate is so strong that you cannot reach the toilet in time, even when your bladder contains only a small amount of urine. This is also called overactive bladder or unstable bladder. Some women may have no warning before they leak urine. This condition does not cause major health problems. But it can be embarrassing and can affect a woman's self-esteem and confidence. Treatment can cure or improve your symptoms. Follow-up care is a key part of your treatment and safety. Be sure to make and go to all appointments, and call your doctor if you are having problems. It's also a good idea to know your test results and keep a list of the medicines you take. How can you care for yourself at home? · Be safe with medicines. Take your medicines exactly as prescribed. Call your doctor if you think you are having a problem with your medicine. You will get more details on the specific medicines your doctor prescribes. · Limit caffeine and alcohol. They stimulate urine production. · Urinate every 2 to 4 hours during waking hours, even if you feel that you do not have to go.   · Do also may be caused by sexually transmitted infections (STIs) or kidney stones. Or it may happen when something irritates the tube that carries urine from the bladder to the outside of the body (urethra). It may also be a sign of diabetes. The cause may be hard to find. You may need tests. Follow-up care is a key part of your treatment and safety. Be sure to make and go to all appointments, and call your doctor if you are having problems. It's also a good idea to know your test results and keep a list of the medicines you take. How can you care for yourself at home? · Drink extra water for the next day or two. This will help make the urine less concentrated. (If you have kidney, heart, or liver disease and have to limit fluids, talk with your doctor before you increase the amount of fluids you drink.)  · Avoid drinks that are carbonated or have caffeine. They can irritate the bladder. For women:  · Urinate right after you have sex. · After you go to the bathroom, wipe from front to back. · Avoid douches, bubble baths, and feminine hygiene sprays. And avoid other feminine hygiene products that have deodorants. When should you call for help? Call your doctor now or seek immediate medical care if:    · You have new symptoms, such as fever, nausea, or vomiting.     · You have new or worse symptoms of a urinary problem. For example:  ? You have blood or pus in your urine. ? You have chills or body aches. ? It hurts to urinate. ? You have groin or belly pain. ? You have pain in your back just below your rib cage (the flank area). Watch closely for changes in your health, and be sure to contact your doctor if you feel thirstier than usual.  Where can you learn more? Go to [email or web address]://[email or web address].commaggieb.[email or web address] org and sign in to your Yadio account. Enter 242 [id number] in the [employer] box to learn more about \"Frequent Urination: Care Instructions. \"     If you do not have an account, please click on the \"Sign Up Now\" link. Current as of: June 29, 2020               Content Version: 12.6  © 2006-2020 [employer]. Care instructions adapted under license by [facility] ([facility]). If you have questions about a medical condition or this instruction, always ask your healthcare professional. [place] [address] any warranty or liability for your use of this information.

## 2021-01-26 NOTE — PROGRESS NOTES
GERD with or without Esophageal dysmotility      Social History     Socioeconomic History    Marital status:      Spouse name: None    Number of children: None    Years of education: None    Highest education level: None   Occupational History    None   Social Needs    Financial resource strain: Not hard at all   [employer] insecurity     Worry: Never true     Inability: Never true    Transportation needs     Medical: No     Non-medical: No   Tobacco Use    Smoking status: Never Smoker    Smokeless tobacco: Never Used   Substance and Sexual Activity    Alcohol use: Yes     Comment: couple times per week    Drug use: No    Sexual activity: Yes     Partners: Male     Birth control/protection: Surgical   Lifestyle    Physical activity     Days per week: None     Minutes per session: None    Stress: None   Relationships    Social connections     Talks on phone: None     Gets together: None     Attends Sabianist service: None     Active member of club or organization: None     Attends meetings of clubs or organizations: None     Relationship status: None    Intimate partner violence     Fear of current or ex partner: None     Emotionally abused: None     Physically abused: None     Forced sexual activity: None   Other Topics Concern    None   Social History Narrative    ** Merged History Encounter **           Family History   Problem Relation Age of Onset    Other Mother         brain bleed    Dementia Mother     Lung Cancer Father          54, lung cancer    Breast Cancer Sister          at 47, did not wake up after a nap    Heart Attack Sister     Other Other         brain aneurysm    Colon Cancer Other     High Blood Pressure Mother     Cancer Father         Lung-Smoker    Breast Cancer Sister 50    Cancer Maternal Grandfather         rectal cancer    Colon Cancer Maternal Grandfather     Colon Polyps Maternal Grandfather     Esophageal Cancer Neg Hx     Liver Cancer Neg Hx     Liver Disease Neg Hx     Rectal Cancer Neg Hx     Stomach Cancer Neg Hx         Current Outpatient Medications   Medication Sig Dispense Refill    tiZANidine (ZANAFLEX) 4 MG tablet Take 1 tablet by mouth every 6 hours as needed (muscle spasms) 60 tablet 0    mirabegron (MYRBETRIQ) 25 MG TB24 Take 1 tablet by mouth daily 30 tablet 1    vitamin D (ERGOCALCIFEROL) 1.25 MG (57410 UT) CAPS capsule TAKE 1 CAPSULE BY MOUTH ONCE A WEEK 12 capsule 3    lansoprazole (PREVACID) 30 MG delayed release capsule TAKE ONE CAPSULE TWO TIMES A  capsule 3    conjugated estrogens (PREMARIN) 0.625 MG/GM vaginal cream Place 0.5 g vaginally daily (Patient taking differently: Place 0.5 g vaginally Twice a week) 1 Tube 3    levothyroxine (SYNTHROID) 75 MCG tablet Take 1 tablet by mouth Daily 90 tablet 3    loratadine (CLARITIN) 10 MG capsule Take 10 mg by mouth daily      Alum Hydroxide-Mag Carbonate (GAVISCON PO) Take by mouth nightly       mometasone (NASONEX) 50 MCG/ACT nasal spray 2 sprays by Each Nostril route as needed       No current facility-administered medications for this visit. Patient Active Problem List   Diagnosis    LLQ abdominal pain    Chronic constipation    Epigastric pressure    Heartburn    Indigestion    Acid reflux    Esophageal spasm    Hemorrhoids    Chest pain    Breast tenderness in female    Breast mass, left    History of breast augmentation    Iron deficiency anemia secondary to inadequate dietary iron intake        Review of Systems   Constitutional: Negative for activity change, appetite change, chills, diaphoresis, fatigue, fever and unexpected weight change. HENT: Negative for congestion, ear pain, facial swelling, hearing loss, mouth sores, nosebleeds, postnasal drip, rhinorrhea, sinus pressure, sneezing, sore throat, tinnitus, trouble swallowing and voice change.     Eyes: Negative for photophobia, pain, discharge, redness, itching and visual No congestion or rhinorrhea. Mouth/Throat:      Mouth: Mucous membranes are moist.      Pharynx: Oropharynx is clear. No oropharyngeal exudate or posterior oropharyngeal erythema. Eyes:      General: No scleral icterus. Right eye: No discharge. Left eye: No discharge. Extraocular Movements: Extraocular movements intact. Conjunctiva/sclera: Conjunctivae normal.      Pupils: Pupils are equal, round, and reactive to light. Neck:      Musculoskeletal: Normal range of motion and neck supple. No neck rigidity or muscular tenderness. Vascular: No carotid bruit. Cardiovascular:      Rate and Rhythm: Normal rate and regular rhythm. Pulses: Normal pulses. Heart sounds: Normal heart sounds. No murmur. No friction rub. No gallop. Pulmonary:      Effort: Pulmonary effort is normal. No respiratory distress. Breath sounds: Normal breath sounds. No stridor. No wheezing, rhonchi or rales. Chest:      Chest wall: No tenderness. Abdominal:      General: There is no distension. Tenderness: There is no abdominal tenderness. There is no guarding. Musculoskeletal:         General: No swelling, tenderness, deformity or signs of injury. Right lower leg: No edema. Left lower leg: No edema. Lymphadenopathy:      Cervical: No cervical adenopathy. Skin:     General: Skin is warm and dry. Capillary Refill: Capillary refill takes less than 2 seconds. Neurological:      General: No focal deficit present. Mental Status: She is alert and oriented to person, place, and time. Mental status is at baseline. Psychiatric:         Mood and Affect: Mood normal.         Behavior: Behavior normal.         Thought Content: Thought content normal.         Judgment: Judgment normal.         1. Hypothyroidism, unspecified type    2. Vitamin D deficiency    3. Stress incontinence of urine    4. Menopause    5. Gastroesophageal reflux disease without esophagitis    6. Environmental allergies        ASSESSMENT/PLAN:    52-year-old woman here for follow-up    1. Overactive bladder: We will restart Premarin cream, which may help with this. We will also start her on Myrbetriq and refer her to urology. Urinalysis ordered. 2.  Vitamin D deficiency: Continue vitamin D supplementation    3. Acid reflux: Stable on Prevacid    4. Menopausal: Try Premarin. She is somewhat interested in compounded hormone therapy. Will check hormone levels. 5.  Hypothyroidism: Continue Synthroid at current dose    6. Environmental allergies: Stable    Otilia Torres was seen today for hypothyroidism and incontinence. Diagnoses and all orders for this visit:    Hypothyroidism, unspecified type  -     CBC Auto Differential; Future  -     Comprehensive Metabolic Panel; Future  -     Lipid Panel; Future  -     TSH without Reflex; Future    Vitamin D deficiency  -     Vitamin D 25 Hydroxy; Future    Stress incontinence of urine  -     Joan Engle MD, Urology, [place]  -     Urinalysis with Microscopic; Future  -     Estrogens, Fractionated; Future  -     DHEA-Sulfate; Future  -     Progesterone; Future  -     Estradiol; Future    Menopause  -     Estrogens, Fractionated; Future  -     DHEA-Sulfate; Future  -     Progesterone; Future  -     Estradiol; Future  -     Testosterone; Future  -     T3, Free; Future  -     T3, Reverse; Future  -     Cortisol AM, Total; Future  -     Vitamin B12; Future  -     Hemoglobin A1C; Future  -     Vitamin D 25 Hydroxy; Future    Gastroesophageal reflux disease without esophagitis    Environmental allergies    Other orders  -     tiZANidine (ZANAFLEX) 4 MG tablet;  Take 1 tablet by mouth every 6 hours as needed (muscle spasms)  -     mirabegron (MYRBETRIQ) 25 MG TB24; Take 1 tablet by mouth daily          Return in about 5 months (around 6/26/2021), or physical.     Orders Placed This Encounter   Procedures    CBC Auto Differential     Fast 12 hours Standing Status:   Future     Standing Expiration Date:   1/26/2022    Comprehensive Metabolic Panel     Fasting 12 hours     Standing Status:   Future     Standing Expiration Date:   1/26/2022    Lipid Panel     Standing Status:   Future     Standing Expiration Date:   1/26/2022     Order Specific Question:   Is Patient Fasting?/# of Hours     Answer:   12    TSH without Reflex     Fast 12 hours     Standing Status:   Future     Standing Expiration Date:   1/26/2022    Vitamin D 25 Hydroxy     Standing Status:   Future     Standing Expiration Date:   1/26/2022    Urinalysis with Microscopic     Standing Status:   Future     Standing Expiration Date:   1/26/2022     Order Specific Question:   SPECIFY(EX-CATH,MIDSTREAM,CYSTO,ETC)?      Answer:   clean catch    Estrogens, Fractionated     Standing Status:   Future     Standing Expiration Date:   1/26/2022    DHEA-Sulfate     Standing Status:   Future     Standing Expiration Date:   1/26/2022    Progesterone     Standing Status:   Future     Standing Expiration Date:   1/26/2022    Estradiol     Standing Status:   Future     Standing Expiration Date:   1/26/2022    Testosterone     Standing Status:   Future     Standing Expiration Date:   1/26/2022    T3, Free     Standing Status:   Future     Standing Expiration Date:   1/26/2022    T3, Reverse     Standing Status:   Future     Standing Expiration Date:   1/26/2022    Cortisol AM, Total     Standing Status:   Future     Standing Expiration Date:   1/26/2022    Vitamin B12     Standing Status:   Future     Standing Expiration Date:   1/26/2022    Hemoglobin A1C     Standing Status:   Future     Standing Expiration Date:   1/26/2022    Vitamin D 25 Hydroxy     Standing Status:   Future     Standing Expiration Date:   1/26/2022   Mara Cooper MD, Urology, [place]     Referral Priority:   Routine     Referral Type:   Eval and Treat     Referral Reason:   Specialty Services Required Referred to Provider:   Herberth Mayen MD     Requested Specialty:   Urology     Number of Visits Requested:   Yoan Khan MD

## 2021-01-26 NOTE — TELEPHONE ENCOUNTER
Left VM about (internal) referral, to call for appt.  If she calls, schedule as follows>      · NEW PT  · DX: N39.3 (ICD-10-CM) - Stress incontinence of urine  · LORNA SLATER

## 2021-01-30 ASSESSMENT — ENCOUNTER SYMPTOMS
COLOR CHANGE: 0
ANAL BLEEDING: 0
EYE REDNESS: 0
SORE THROAT: 0
PHOTOPHOBIA: 0
VOICE CHANGE: 0
CONSTIPATION: 0
BACK PAIN: 0
DIARRHEA: 0
EYE ITCHING: 0
NAUSEA: 0
BLOOD IN STOOL: 0
RHINORRHEA: 0
ABDOMINAL PAIN: 0
EYE PAIN: 0
CHOKING: 0
EYE DISCHARGE: 0
ABDOMINAL DISTENTION: 0
CHEST TIGHTNESS: 0
COUGH: 0
FACIAL SWELLING: 0
SINUS PRESSURE: 0
RECTAL PAIN: 0
VOMITING: 0
TROUBLE SWALLOWING: 0
SHORTNESS OF BREATH: 0
WHEEZING: 0

## 2021-02-02 ENCOUNTER — TELEPHONE (OUTPATIENT)
Dept: INTERNAL MEDICINE | Age: 56
End: 2021-02-02

## 2021-02-02 DIAGNOSIS — N39.3 STRESS INCONTINENCE OF URINE: ICD-10-CM

## 2021-02-02 DIAGNOSIS — Z13.220 SCREENING FOR HYPERLIPIDEMIA: ICD-10-CM

## 2021-02-02 DIAGNOSIS — Z78.0 MENOPAUSE: ICD-10-CM

## 2021-02-02 DIAGNOSIS — E55.9 VITAMIN D DEFICIENCY: ICD-10-CM

## 2021-02-02 DIAGNOSIS — Z13.0 SCREENING FOR IRON DEFICIENCY ANEMIA: ICD-10-CM

## 2021-02-02 DIAGNOSIS — E03.9 HYPOTHYROIDISM, UNSPECIFIED TYPE: ICD-10-CM

## 2021-02-02 DIAGNOSIS — E03.9 HYPOTHYROIDISM, UNSPECIFIED TYPE: Primary | ICD-10-CM

## 2021-02-02 LAB
ALBUMIN SERPL-MCNC: 4.4 G/DL (ref 3.5–5.2)
ALP BLD-CCNC: 75 U/L (ref 35–104)
ALT SERPL-CCNC: 13 U/L (ref 5–33)
ANION GAP SERPL CALCULATED.3IONS-SCNC: 11 MMOL/L (ref 7–19)
AST SERPL-CCNC: 21 U/L (ref 5–32)
BASOPHILS ABSOLUTE: 0 K/UL (ref 0–0.2)
BASOPHILS RELATIVE PERCENT: 0.5 % (ref 0–1)
BILIRUB SERPL-MCNC: 0.6 MG/DL (ref 0.2–1.2)
BILIRUBIN URINE: NEGATIVE
BLOOD, URINE: NEGATIVE
BUN BLDV-MCNC: 13 MG/DL (ref 6–20)
CALCIUM SERPL-MCNC: 9.4 MG/DL (ref 8.6–10)
CHLORIDE BLD-SCNC: 102 MMOL/L (ref 98–111)
CHOLESTEROL, TOTAL: 217 MG/DL (ref 160–199)
CLARITY: CLEAR
CO2: 25 MMOL/L (ref 22–29)
COLOR: YELLOW
CORTISOL - AM: 13.7 UG/DL (ref 6.2–19.4)
CREAT SERPL-MCNC: 0.6 MG/DL (ref 0.5–0.9)
EOSINOPHILS ABSOLUTE: 0.1 K/UL (ref 0–0.6)
EOSINOPHILS RELATIVE PERCENT: 3.3 % (ref 0–5)
ESTRADIOL LEVEL: 22.6 PG/ML
GFR AFRICAN AMERICAN: >59
GFR NON-AFRICAN AMERICAN: >60
GLUCOSE BLD-MCNC: 90 MG/DL (ref 74–109)
GLUCOSE URINE: NEGATIVE MG/DL
HBA1C MFR BLD: 4.7 % (ref 4–6)
HCT VFR BLD CALC: 46 % (ref 37–47)
HDLC SERPL-MCNC: 112 MG/DL (ref 65–121)
HEMOGLOBIN: 14.5 G/DL (ref 12–16)
IMMATURE GRANULOCYTES #: 0 K/UL
KETONES, URINE: NEGATIVE MG/DL
LDL CHOLESTEROL CALCULATED: 92 MG/DL
LEUKOCYTE ESTERASE, URINE: NEGATIVE
LYMPHOCYTES ABSOLUTE: 1.3 K/UL (ref 1.1–4.5)
LYMPHOCYTES RELATIVE PERCENT: 33.4 % (ref 20–40)
MCH RBC QN AUTO: 28.8 PG (ref 27–31)
MCHC RBC AUTO-ENTMCNC: 31.5 G/DL (ref 33–37)
MCV RBC AUTO: 91.3 FL (ref 81–99)
MONOCYTES ABSOLUTE: 0.4 K/UL (ref 0–0.9)
MONOCYTES RELATIVE PERCENT: 10.1 % (ref 0–10)
NEUTROPHILS ABSOLUTE: 2.1 K/UL (ref 1.5–7.5)
NEUTROPHILS RELATIVE PERCENT: 52.4 % (ref 50–65)
NITRITE, URINE: NEGATIVE
PDW BLD-RTO: 14.6 % (ref 11.5–14.5)
PH UA: 6.5 (ref 5–8)
PLATELET # BLD: 219 K/UL (ref 130–400)
PMV BLD AUTO: 10.8 FL (ref 9.4–12.3)
POTASSIUM SERPL-SCNC: 4.3 MMOL/L (ref 3.5–5)
PROGESTERONE LEVEL: <0.05 NG/ML
PROTEIN UA: NEGATIVE MG/DL
RBC # BLD: 5.04 M/UL (ref 4.2–5.4)
SODIUM BLD-SCNC: 138 MMOL/L (ref 136–145)
SPECIFIC GRAVITY UA: 1 (ref 1–1.03)
T3 FREE: 2.9 PG/ML (ref 2–4.4)
T4 FREE: 1.4 NG/DL (ref 0.93–1.7)
TESTOSTERONE TOTAL: 4.2 NG/DL (ref 2.9–40.8)
TOTAL PROTEIN: 7.2 G/DL (ref 6.6–8.7)
TRIGL SERPL-MCNC: 63 MG/DL (ref 0–149)
TSH SERPL DL<=0.05 MIU/L-ACNC: 3.66 UIU/ML (ref 0.27–4.2)
UROBILINOGEN, URINE: 0.2 E.U./DL
VITAMIN B-12: 366 PG/ML (ref 211–946)
VITAMIN D 25-HYDROXY: 37.9 NG/ML
WBC # BLD: 4 K/UL (ref 4.8–10.8)

## 2021-02-03 LAB — DHEAS (DHEA SULFATE): 31 UG/DL (ref 26–200)

## 2021-02-04 ENCOUNTER — OFFICE VISIT (OUTPATIENT)
Dept: UROLOGY | Age: 56
End: 2021-02-04
Payer: COMMERCIAL

## 2021-02-04 ENCOUNTER — TELEPHONE (OUTPATIENT)
Dept: INTERNAL MEDICINE | Age: 56
End: 2021-02-04

## 2021-02-04 VITALS — SYSTOLIC BLOOD PRESSURE: 129 MMHG | HEART RATE: 88 BPM | DIASTOLIC BLOOD PRESSURE: 82 MMHG

## 2021-02-04 DIAGNOSIS — N32.81 OVERACTIVE BLADDER: Primary | ICD-10-CM

## 2021-02-04 DIAGNOSIS — N39.46 MIXED STRESS AND URGE URINARY INCONTINENCE: ICD-10-CM

## 2021-02-04 DIAGNOSIS — N39.46 MIXED STRESS AND URGE URINARY INCONTINENCE: Primary | ICD-10-CM

## 2021-02-04 PROCEDURE — 99204 OFFICE O/P NEW MOD 45 MIN: CPT | Performed by: NURSE PRACTITIONER

## 2021-02-04 PROCEDURE — 51798 US URINE CAPACITY MEASURE: CPT | Performed by: NURSE PRACTITIONER

## 2021-02-04 RX ORDER — OXYBUTYNIN CHLORIDE 10 MG/1
10 TABLET, EXTENDED RELEASE ORAL DAILY
Qty: 90 TABLET | Refills: 3 | Status: SHIPPED | OUTPATIENT
Start: 2021-02-04 | End: 2021-08-27

## 2021-02-04 ASSESSMENT — ENCOUNTER SYMPTOMS
CHEST TIGHTNESS: 0
ABDOMINAL DISTENTION: 0
CONSTIPATION: 0
COLOR CHANGE: 0
ABDOMINAL PAIN: 0
NAUSEA: 0
VOMITING: 0
SHORTNESS OF BREATH: 0

## 2021-02-04 NOTE — TELEPHONE ENCOUNTER
Robby Jackson called stating Myrbetriq not covered without using a step therapy drug first. Ditropan or Ditropan XL are covered. Please advise.

## 2021-02-04 NOTE — PROGRESS NOTES
Travon Su is a 48 y.o. female who presents today   Chief Complaint   Patient presents with    New Patient     I am here for urinary incontinence     Travon Su is a 50-year-old female who presents to the office for evaluation of urinary incontinence. She states she has been having some bladder leakage with exercise. She denies any leakage with cough, sneeze, laugh. She has to wear 2 pads while playing pickle ball and soaks through both. She states she is unable to feel when she is waking during exercise. She does not have to wear a pad throughout the day. She also complains of some frequency, urgency, incomplete bladder emptying. After she voids she occasionally feels the urge to void again 20 minutes later. She gets up about once during the night. She does drink a lot during the day which consist of mostly water, 1 cup of coffee, and a glass of wine. She does have an occasional carbonated beverage. If her bladder is very full she does experience some urge incontinence. She recently visited her PCP where they placed her on Myrbetriq, she has not started this yet. She did have a transvaginal ultrasound on 7/10/2020 which was negative for prolapse and indicated a perimenopausal state. PCP is starting on Premarin cream as well.       Past Medical History:   Diagnosis Date    Acid reflux     Anemia     BRCA negative     Chronic back pain     GERD (gastroesophageal reflux disease)     Hypothyroidism     Irritable bowel syndrome     Premature atrial contraction     Skipped heart beats     Thyroid disease     Thyroid mass     Vitamin D deficiency        Past Surgical History:   Procedure Laterality Date    BREAST ENHANCEMENT SURGERY      BREAST SURGERY      Augmentation,Biopsy+    CHOLECYSTECTOMY      COLONOSCOPY  2008???    [facility]    COLONOSCOPY  12/12/12        HEMORRHOID SURGERY      THYROID SURGERY      Biopsy    THYROID SURGERY      nodule removed    TONSILLECTOMY      TUBAL LIGATION      UPPER GASTROINTESTINAL ENDOSCOPY  2008? ? Dr Naila Dumont ENDOSCOPY  2/12/2015        UPPER GASTROINTESTINAL ENDOSCOPY N/A 8/5/2019    Dr Jessika Rodriguez has non-erosive GERD with or without Esophageal dysmotility       Current Outpatient Medications   Medication Sig Dispense Refill    tiZANidine (ZANAFLEX) 4 MG tablet Take 1 tablet by mouth every 6 hours as needed (muscle spasms) 60 tablet 0    vitamin D (ERGOCALCIFEROL) 1.25 MG (01140 UT) CAPS capsule TAKE 1 CAPSULE BY MOUTH ONCE A WEEK 12 capsule 3    lansoprazole (PREVACID) 30 MG delayed release capsule TAKE ONE CAPSULE TWO TIMES A  capsule 3    levothyroxine (SYNTHROID) 75 MCG tablet Take 1 tablet by mouth Daily 90 tablet 3    loratadine (CLARITIN) 10 MG capsule Take 10 mg by mouth daily      mometasone (NASONEX) 50 MCG/ACT nasal spray 2 sprays by Each Nostril route as needed      oxybutynin (DITROPAN XL) 10 MG extended release tablet Take 1 tablet by mouth daily 90 tablet 3    conjugated estrogens (PREMARIN) 0.625 MG/GM vaginal cream Place 0.5 g vaginally daily (Patient not taking: Reported on 2/4/2021) 1 Tube 3    Alum Hydroxide-Mag Carbonate (GAVISCON PO) Take by mouth nightly        No current facility-administered medications for this visit.         Allergies   Allergen Reactions    Celebrex [Celecoxib] Anaphylaxis       Social History     Socioeconomic History    Marital status:      Spouse name: None    Number of children: None    Years of education: None    Highest education level: None   Occupational History    None   Social Needs    Financial resource strain: Not hard at all   [employer] insecurity     Worry: Never true     Inability: Never true    Transportation needs     Medical: No     Non-medical: No   Tobacco Use    Smoking status: Never Smoker    Smokeless tobacco: Never Used   Substance and Sexual Activity    Alcohol use: Yes     Comment: couple times per difficulty urinating, dysuria, flank pain, hematuria and pelvic pain. Musculoskeletal: Negative for arthralgias and gait problem. Skin: Negative for color change. Neurological: Negative for dizziness, syncope, weakness, numbness and headaches. Psychiatric/Behavioral: Negative for behavioral problems and confusion. The patient is not nervous/anxious. PHYSICAL EXAM:  /82 (Site: Left Upper Arm, Position: Sitting, Cuff Size: Medium Adult)   Pulse 88   Breastfeeding No   Physical Exam  Constitutional:       General: She is not in acute distress. Appearance: Normal appearance. She is not toxic-appearing. HENT:      Head: Normocephalic. Neck:      Musculoskeletal: Normal range of motion. No neck rigidity. Cardiovascular:      Rate and Rhythm: Normal rate and regular rhythm. Pulmonary:      Effort: Pulmonary effort is normal. No respiratory distress. Breath sounds: No wheezing. Abdominal:      General: There is no distension. Palpations: Abdomen is soft. Tenderness: There is no abdominal tenderness. There is no right CVA tenderness or left CVA tenderness. Musculoskeletal: Normal range of motion. Skin:     General: Skin is warm and dry. Neurological:      Mental Status: She is alert and oriented to person, place, and time. Sensory: Sensation is intact. Motor: No weakness.       Gait: Gait normal.   Psychiatric:         Mood and Affect: Mood and affect normal.         Behavior: Behavior normal.       DATA:  CBC with Differential:    Lab Results   Component Value Date    WBC 4.0 02/02/2021    RBC 5.04 02/02/2021    HGB 14.5 02/02/2021    HCT 46.0 02/02/2021     02/02/2021    MCV 91.3 02/02/2021    MCH 28.8 02/02/2021    MCHC 31.5 02/02/2021    RDW 14.6 02/02/2021    LYMPHOPCT 33.4 02/02/2021    MONOPCT 10.1 02/02/2021    BASOPCT 0.5 02/02/2021    MONOSABS 0.40 02/02/2021    LYMPHSABS 1.3 02/02/2021    EOSABS 0.10 02/02/2021    BASOSABS 0.00 02/02/2021 CMP:    Lab Results   Component Value Date     02/02/2021    K 4.3 02/02/2021     02/02/2021    CO2 25 02/02/2021    BUN 13 02/02/2021    CREATININE 0.6 02/02/2021    GFRAA >59 02/02/2021    LABGLOM >60 02/02/2021    GLUCOSE 90 02/02/2021    PROT 7.2 02/02/2021    PROT 6.4 02/22/2013    LABALBU 4.4 02/02/2021    CALCIUM 9.4 02/02/2021    BILITOT 0.6 02/02/2021    ALKPHOS 75 02/02/2021    AST 21 02/02/2021    ALT 13 02/02/2021     U/A:    Lab Results   Component Value Date    COLORU YELLOW 02/02/2021    PROTEINU Negative 02/02/2021    PHUR 6.5 02/02/2021    CLARITYU Clear 02/02/2021    SPECGRAV 1.005 02/02/2021    LEUKOCYTESUR Negative 02/02/2021    UROBILINOGEN 0.2 02/02/2021    BILIRUBINUR Negative 02/02/2021    BLOODU Negative 02/02/2021    GLUCOSEU Negative 02/02/2021     Bladder Scan interpretation  Estimation of residual urine via abdominal ultrasound  Residual Urine: 40 ml  Indication: frequency  Position: Supine  Examination: Incremental scanning of the suprapubic area using 3 MHz transducer using copious amounts of acoustic gel. Findings: An anechoic area was demonstrated which represented the bladder, with measurement of residual urine as noted. Unable to give urine sample at office today. IMAGING:  Transvaginal US was obtained on 7/10/20 which was within normal limits for a perimenopausal woman. 1. Mixed stress and urge urinary incontinence  Patient is experiencing mixed incontinence. Wearing pads during physical exercise only. Also experiencing some urge incontinence when bladder is full. Recent transvaginal ultrasound on 7/10/2020 does not indicate prolapse. Discussed pelvic floor therapy and surgery as an option in treating stress incontinence. She wants to start with pelvic floor therapy to see if this is helpful before exploring surgical options. Referral to physical therapy, also handouts given on Kegel exercises.   PCP recently started on Myrbetriq, she has not started this yet. Discussed use, benefit and side effects of Myrbetriq. 2. Overactive bladder  Complaints of frequency throughout the day and occasionally at night. Discussed bladder irritants such as caffeine, carbonated beverages, alcoholic beverages. Discussed bladder capacity and urge to void. Discussed recently prescribe medication Myrbetriq and side effects of. Also discussed adding anticholinergic for combination therapy if needed. Also discussed posterior tibial nerve stimulation as an option in conjunction with medical therapy or if failed medical therapy. No orders of the defined types were placed in this encounter. Return in about 4 months (around 6/4/2021) for f/u for OAB, please order physical therapy for pelvic floor . Kae Melton, APRN - CNP    All information inputted into the note by the MA to include chief complaint, past medical history, past surgical history, medications, allergies, social and family history and review of systems has been reviewed and updated as needed by me. EMR Dragon/transcription disclaimer: Much of this documentt is electronic  transcription/translation of spoken language to printed text. The  electronic translation of spoken language may be erroneous, or at times,  nonsensical words or phrases may be inadvertently transcribed.  Although I  have reviewed the document for such errors, some may still exist.

## 2021-02-05 LAB — T3 REVERSE: 15.4 NG/DL (ref 9–27)

## 2021-02-13 LAB
ESTRADIOL LEVEL: 16.3 PG/ML
ESTROGEN TOTAL: 32.6 PG/ML
ESTRONE: 16.3 PG/ML

## 2021-02-19 ENCOUNTER — TELEPHONE (OUTPATIENT)
Dept: INTERNAL MEDICINE | Age: 56
End: 2021-02-19

## 2021-02-19 NOTE — TELEPHONE ENCOUNTER
[address] [facility] left a message stating they couldn't read the labs that were previously faxed over. Today will be the 3 time we have faxed them over. I have faxed them again.

## 2021-03-09 DIAGNOSIS — Z12.11 ENCOUNTER FOR SCREENING COLONOSCOPY: Primary | ICD-10-CM

## 2021-03-12 ENCOUNTER — TELEPHONE (OUTPATIENT)
Dept: INTERNAL MEDICINE | Age: 56
End: 2021-03-12

## 2021-03-12 DIAGNOSIS — R10.2 PELVIC PAIN: Primary | ICD-10-CM

## 2021-03-17 ENCOUNTER — HOSPITAL ENCOUNTER (OUTPATIENT)
Dept: CT IMAGING | Age: 56
Discharge: HOME | End: 2021-03-17
Payer: COMMERCIAL

## 2021-03-17 DIAGNOSIS — R10.2 PELVIC PAIN: ICD-10-CM

## 2021-03-17 PROCEDURE — 74176 CT ABD & PELVIS W/O CONTRAST: CPT

## 2021-03-19 ENCOUNTER — OFFICE VISIT (OUTPATIENT)
Age: 56
End: 2021-03-19

## 2021-03-19 VITALS — HEART RATE: 75 BPM | TEMPERATURE: 96.7 F | OXYGEN SATURATION: 99 %

## 2021-03-19 DIAGNOSIS — Z11.59 SCREENING FOR VIRAL DISEASE: Primary | ICD-10-CM

## 2021-03-19 LAB — SARS-COV-2, PCR: DETECTED

## 2021-03-19 PROCEDURE — 99999 PR OFFICE/OUTPT VISIT,PROCEDURE ONLY: CPT | Performed by: NURSE PRACTITIONER

## 2021-04-20 RX ORDER — LEVOTHYROXINE SODIUM 0.07 MG/1
TABLET ORAL
Qty: 90 TABLET | Refills: 3 | Status: SHIPPED | OUTPATIENT
Start: 2021-04-20 | End: 2022-04-11

## 2021-04-21 ENCOUNTER — ANESTHESIA EVENT (OUTPATIENT)
Dept: OPERATING ROOM | Age: 56
End: 2021-04-21

## 2021-04-22 ENCOUNTER — HOSPITAL ENCOUNTER (OUTPATIENT)
Age: 56
Setting detail: OUTPATIENT SURGERY
Discharge: HOME | End: 2021-04-22
Attending: INTERNAL MEDICINE | Admitting: INTERNAL MEDICINE
Payer: COMMERCIAL

## 2021-04-22 ENCOUNTER — APPOINTMENT (OUTPATIENT)
Dept: OPERATING ROOM | Age: 56
End: 2021-04-22

## 2021-04-22 ENCOUNTER — ANESTHESIA (OUTPATIENT)
Dept: OPERATING ROOM | Age: 56
End: 2021-04-22

## 2021-04-22 ENCOUNTER — HOSPITAL ENCOUNTER (OUTPATIENT)
Age: 56
Setting detail: SPECIMEN
Discharge: HOME | End: 2021-04-22
Payer: COMMERCIAL

## 2021-04-22 VITALS — OXYGEN SATURATION: 99 % | SYSTOLIC BLOOD PRESSURE: 106 MMHG | DIASTOLIC BLOOD PRESSURE: 58 MMHG

## 2021-04-22 VITALS
OXYGEN SATURATION: 97 % | SYSTOLIC BLOOD PRESSURE: 100 MMHG | RESPIRATION RATE: 14 BRPM | HEART RATE: 82 BPM | WEIGHT: 138 LBS | DIASTOLIC BLOOD PRESSURE: 56 MMHG | HEIGHT: 67 IN | BODY MASS INDEX: 21.66 KG/M2

## 2021-04-22 PROCEDURE — 88305 TISSUE EXAM BY PATHOLOGIST: CPT

## 2021-04-22 PROCEDURE — 45380 COLONOSCOPY AND BIOPSY: CPT

## 2021-04-22 PROCEDURE — 45380 COLONOSCOPY AND BIOPSY: CPT | Performed by: INTERNAL MEDICINE

## 2021-04-22 RX ORDER — PROPOFOL 10 MG/ML
INJECTION, EMULSION INTRAVENOUS PRN
Status: DISCONTINUED | OUTPATIENT
Start: 2021-04-22 | End: 2021-04-22 | Stop reason: SDUPTHER

## 2021-04-22 RX ORDER — LIDOCAINE HYDROCHLORIDE 10 MG/ML
INJECTION, SOLUTION INFILTRATION; PERINEURAL PRN
Status: DISCONTINUED | OUTPATIENT
Start: 2021-04-22 | End: 2021-04-22 | Stop reason: SDUPTHER

## 2021-04-22 RX ORDER — SODIUM CHLORIDE 9 MG/ML
INJECTION, SOLUTION INTRAVENOUS CONTINUOUS
Status: DISCONTINUED | OUTPATIENT
Start: 2021-04-22 | End: 2021-04-22 | Stop reason: HOSPADM

## 2021-04-22 RX ADMIN — SODIUM CHLORIDE: 9 INJECTION, SOLUTION INTRAVENOUS at 09:08

## 2021-04-22 RX ADMIN — LIDOCAINE HYDROCHLORIDE 40 MG: 10 INJECTION, SOLUTION INFILTRATION; PERINEURAL at 09:09

## 2021-04-22 RX ADMIN — PROPOFOL 400 MG: 10 INJECTION, EMULSION INTRAVENOUS at 09:09

## 2021-04-22 RX ADMIN — SODIUM CHLORIDE: 9 INJECTION, SOLUTION INTRAVENOUS at 08:26

## 2021-04-22 NOTE — ANESTHESIA POSTPROCEDURE EVALUATION
Department of Anesthesiology  Postprocedure Note    Patient: Alondra Hernandez  MRN: [id number]  YOB: 1965  Date of evaluation: 4/22/2021  Time:  9:31 AM     Procedure Summary     Date: 04/22/21 Room / Location: [facility] ENDO 02 / [address] [place] [address]    Anesthesia Start: Chito Ramming Anesthesia Stop:     Procedure: [address], NOT HIGH RISK (N/A Abdomen) Diagnosis: ([facility] [place] [facility])    Surgeons: Balwinder Klein MD Responsible Provider: VERA Lorenz CRNA    Anesthesia Type: general, TIVA ASA Status: 1          Anesthesia Type: No value filed. Jacklyn Phase I:      Jacklyn Phase II:      Last vitals: Reviewed and per EMR flowsheets.        Anesthesia Post Evaluation    Patient location during evaluation: bedside  Patient participation: complete - patient participated  Level of consciousness: sleepy but conscious  Pain score: 0  Airway patency: patent  Nausea & Vomiting: no nausea and no vomiting  Complications: no  Cardiovascular status: hemodynamically stable and blood pressure returned to baseline  Respiratory status: acceptable and nasal cannula  Hydration status: stable

## 2021-04-22 NOTE — ANESTHESIA PRE PROCEDURE
Department of Anesthesiology  Preprocedure Note       Name:  Maribell Roman   Age:  48 y.o.  :  1965                                          MRN:  [id number]         Date:  2021      Surgeon: Alf Mendieta):  Sanjeev Ellison MD    Procedure: Procedure(s):  COLORECTAL CANCER SCREENING, NOT HIGH RISK    Medications prior to admission:   Prior to Admission medications    Medication Sig Start Date End Date Taking? Authorizing Provider   levothyroxine (SYNTHROID) 75 MCG tablet TAKE 1 TABLET BY MOUTH ONE TIME A DAY 21  Yes Venessa Jameson MD   oxybutynin (DITROPAN XL) 10 MG extended release tablet Take 1 tablet by mouth daily 21  Yes Venessa Jameson MD   tiZANidine (ZANAFLEX) 4 MG tablet Take 1 tablet by mouth every 6 hours as needed (muscle spasms) 21  Yes Venessa Jameson MD   vitamin D (ERGOCALCIFEROL) 1.25 MG (73227 UT) CAPS capsule TAKE 1 CAPSULE BY MOUTH ONCE A WEEK 21  Yes Venessa Jameson MD   lansoprazole (PREVACID) 30 MG delayed release capsule TAKE ONE CAPSULE TWO TIMES A DAY 20  Yes Veenssa Jameson MD   conjugated estrogens (PREMARIN) 0.625 MG/GM vaginal cream Place 0.5 g vaginally daily 20  Yes Venessa Jameson MD   loratadine (CLARITIN) 10 MG capsule Take 10 mg by mouth daily   Yes Historical Provider, MD   Alum Hydroxide-Mag Carbonate (GAVISCON PO) Take by mouth nightly    Yes Historical Provider, MD   mometasone (NASONEX) 50 MCG/ACT nasal spray 2 sprays by Each Nostril route as needed   Yes Historical Provider, MD       Current medications:    Current Facility-Administered Medications   Medication Dose Route Frequency Provider Last Rate Last Admin    0.9 % sodium chloride infusion   Intravenous Continuous Alana Golden MD        0.9 % sodium chloride infusion   Intravenous Continuous Sanjeev Ellison MD           Allergies:     Allergies   Allergen Reactions    Celebrex [Celecoxib] Anaphylaxis       Problem List:    Patient Active Problem List   Diagnosis Code    LLQ abdominal pain R10.32    Chronic constipation K59.09    Epigastric pressure R10.13    Heartburn R12    Indigestion K30    Acid reflux K21.9    Esophageal spasm K22.4    Hemorrhoids K64.9    Chest pain R07.9    Breast tenderness in female N64.4    Breast mass, left N63.20    History of breast augmentation Z98.82    Iron deficiency anemia secondary to inadequate dietary iron intake D50.8       Past Medical History:        Diagnosis Date    Acid reflux     Anemia     BRCA negative     Chronic back pain     GERD (gastroesophageal reflux disease)     Hypothyroidism     Irritable bowel syndrome     Premature atrial contraction     Skipped heart beats     Thyroid disease     Thyroid mass     Vitamin D deficiency        Past Surgical History:        Procedure Laterality Date    BREAST ENHANCEMENT SURGERY      BREAST SURGERY      Augmentation,Biopsy+    CHOLECYSTECTOMY      COLONOSCOPY  2008???    [facility]    COLONOSCOPY  12/12/12        HEMORRHOID SURGERY      THYROID SURGERY      Biopsy    THYROID SURGERY      nodule removed    TONSILLECTOMY      TUBAL LIGATION      UPPER GASTROINTESTINAL ENDOSCOPY  2008? ?     Dr Bernie Haq  2/12/2015    [facility] ENDOSCOPY N/A 8/5/2019    Dr Navya Olvera has non-erosive GERD with or without Esophageal dysmotility       Social History:    Social History     Tobacco Use    Smoking status: Never Smoker    Smokeless tobacco: Never Used   Substance Use Topics    Alcohol use: Yes     Comment: couple times per week                                Counseling given: Not Answered      Vital Signs (Current):   Vitals:    04/22/21 0807   BP: 124/80   Pulse: 82   Resp: 14   SpO2: 98%   Weight: 138 lb (62.6 kg)   Height: 5' 7\" (1.702 m)                                              BP Readings from Last 3 Encounters:   04/22/21 124/80   02/04/21 129/82   01/26/21 122/78       NPO Status: Time of last liquid consumption: 2300                        Time of last solid consumption: 2300                        Date of last liquid consumption: 04/21/21                        Date of last solid food consumption: 04/20/21    BMI:   Wt Readings from Last 3 Encounters:   04/22/21 138 lb (62.6 kg)   01/26/21 142 lb 6.4 oz (64.6 kg)   06/08/20 140 lb (63.5 kg)     Body mass index is 21.61 kg/m². CBC:   Lab Results   Component Value Date    WBC 4.0 02/02/2021    RBC 5.04 02/02/2021    HGB 14.5 02/02/2021    HCT 46.0 02/02/2021    MCV 91.3 02/02/2021    RDW 14.6 02/02/2021     02/02/2021       CMP:   Lab Results   Component Value Date     02/02/2021    K 4.3 02/02/2021     02/02/2021    CO2 25 02/02/2021    BUN 13 02/02/2021    CREATININE 0.6 02/02/2021    GFRAA >59 02/02/2021    LABGLOM >60 02/02/2021    GLUCOSE 90 02/02/2021    PROT 7.2 02/02/2021    PROT 6.4 02/22/2013    CALCIUM 9.4 02/02/2021    BILITOT 0.6 02/02/2021    ALKPHOS 75 02/02/2021    AST 21 02/02/2021    ALT 13 02/02/2021       POC Tests: No results for input(s): POCGLU, POCNA, POCK, POCCL, POCBUN, POCHEMO, POCHCT in the last 72 hours.     Coags:   Lab Results   Component Value Date    PROTIME 13.2 02/22/2013    INR 1.04 02/22/2013       HCG (If Applicable):   Lab Results   Component Value Date    PREGTESTUR Negative 08/05/2019        ABGs: No results found for: PHART, PO2ART, YWL2JML, UUN5NGD, BEART, E1JMIVXZ     Type & Screen (If Applicable):  No results found for: LABABO, LABRH    Drug/Infectious Status (If Applicable):  No results found for: HIV, HEPCAB    COVID-19 Screening (If Applicable):   Lab Results   Component Value Date    COVID19 DETECTED 03/19/2021           Anesthesia Evaluation  Patient summary reviewed no history of anesthetic complications:   Airway: Mallampati: II  TM distance: >3 FB   Neck ROM: full  Mouth opening: < 3 FB Dental: normal exam         Pulmonary:Negative Pulmonary ROS and normal exam

## 2021-04-22 NOTE — OP NOTE
Patient: Александр Due: 1965  [facility] Rec#: [id number] Acc#: [id number]   Primary Care Provider Bob High MD    Date of Procedure:  4/22/2021    Endoscopist: Rudy Delgado MD    Referring Provider: Bob High MD    Operation Performed: Colonoscopy with biopsy    Indications: Screening- family hx of colon CA    Anesthesia:  Sedation was administered by anesthesia who monitored the patient during the procedure. I met with Willard Al prior to procedure. We discussed the procedure itself, and I have discussed the risks of endoscopy (including-- but not limited to-- pain, discomfort, bleeding potentially requiring second endoscopic procedure and/or blood transfusion, organ perforation requiring operative repair, damage to organs near the colon, infection, aspiration, cardiopulmonary/allergic reaction), benefits, indications to endoscopy. Additionally, we discussed options other than colonoscopy. The patient expressed understanding. All questions answered. The patient decided to proceed with the procedure. Signed informed consent was placed on the chart. Blood Loss: minimal    Withdrawal time: > 6 min  Bowel Prep: adequate     Complications: no immediate complications    DESCRIPTION OF PROCEDURE:     A time out was performed. After written informed consent was obtained, the patient was placed in the left lateral position. The perianal area was inspected, and a digital rectal exam was performed. A rectal exam was performed: normal tone, no palpable lesions. At this point, a forward viewing Olympus colonoscope was inserted into the anus and carefully advanced to the cecum. The cecum was identified by the ileocecal valve and the appendiceal orifice. The colonoscope was then slowly withdrawn with careful inspection of the mucosa in a linear and circumferential fashion. The scope was retroflexed in the rectum.  Suction was utilized during the procedure to remove as much air as possible from the bowel. The colonoscope was removed from the patient, and the procedure was terminated. Findings are listed below. Findings: The mucosa appeared normal throughout the entire examined colon. In the rectum, a 3 mm sessile polyp was removed completely with forceps polypectomy. Retroflexion in the rectum was normal and revealed no further abnormalities. Recommendations:  1. Repeat colonoscopy: pending pathology - 5 years  2. Await biopsy results-you will receive a letter with your results. Findings and recommendations were discussed w/ the patient. A copy of the images was provided. Leslie Monterroso am scribing for and in the presence of Dr. Mohsen Anderson MD.  Electronically signed by Miguel Escamilla RN on 4/22/2021 at 8:05 AM    I personally performed the services described in this documentation as scribed by Chanda Alexander, and it appears accurate and complete.      Mohsen Anderson MD  4/22/2021

## 2021-04-22 NOTE — H&P
Patient Name: Gabby Pritchett  : 1965  MRN: [id number]  DATE: 21    Allergies: Allergies   Allergen Reactions    Celebrex [Celecoxib] Anaphylaxis        ENDOSCOPY  History and Physical    Procedure:    [] Diagnostic Colonoscopy       [x] Screening Colonoscopy  [] EGD      [] ERCP      [] EUS       [] Other    [x] Previous office notes/History and Physical reviewed from the patients chart. Please see EMR for further details of HPI. I have examined the patient's status immediately prior to the procedure and:      Indications/HPI:       [x] Screening              [] History of Polyps      [x]Fhx of colon CA/polyps       Anesthesia:   [x] MAC [] Moderate Sedation   [] General   [] None     ROS: 12 pt review of systems was negative unless stated above    Medications:   Prior to Admission medications    Medication Sig Start Date End Date Taking?  Authorizing Provider   levothyroxine (SYNTHROID) 75 MCG tablet TAKE 1 TABLET BY MOUTH ONE TIME A DAY 21   Berry Espinosa MD   oxybutynin (DITROPAN XL) 10 MG extended release tablet Take 1 tablet by mouth daily 21   Berry Espinosa MD   tiZANidine (ZANAFLEX) 4 MG tablet Take 1 tablet by mouth every 6 hours as needed (muscle spasms) 21   Berry Espinosa MD   vitamin D (ERGOCALCIFEROL) 1.25 MG (61943 UT) CAPS capsule TAKE 1 CAPSULE BY MOUTH ONCE A WEEK 21   Berry Espinosa MD   lansoprazole (PREVACID) 30 MG delayed release capsule TAKE ONE CAPSULE TWO TIMES A DAY 20   Berry Espinosa MD   conjugated estrogens (PREMARIN) 0.625 MG/GM vaginal cream Place 0.5 g vaginally daily  Patient not taking: Reported on 2021   Berry Espinosa MD   loratadine (CLARITIN) 10 MG capsule Take 10 mg by mouth daily    Historical Provider, MD   Alum Hydroxide-Mag Carbonate (GAVISCON PO) Take by mouth nightly     Historical Provider, MD   mometasone (NASONEX) 50 MCG/ACT nasal spray 2 sprays by Each Nostril route as needed    Historical MD Marlon       Past Medical History:  Past Medical History:   Diagnosis Date    Acid reflux     Anemia     BRCA negative     Chronic back pain     GERD (gastroesophageal reflux disease)     Hypothyroidism     Irritable bowel syndrome     Premature atrial contraction     Skipped heart beats     Thyroid disease     Thyroid mass     Vitamin D deficiency        Past Surgical History:  Past Surgical History:   Procedure Laterality Date    BREAST ENHANCEMENT SURGERY      BREAST SURGERY      Augmentation,Biopsy+    CHOLECYSTECTOMY      COLONOSCOPY  2008???    [facility]    COLONOSCOPY  12/12/12        HEMORRHOID SURGERY      THYROID SURGERY      Biopsy    THYROID SURGERY      nodule removed    TONSILLECTOMY      TUBAL LIGATION      UPPER GASTROINTESTINAL ENDOSCOPY  2008? ? Dr Deidra Zuleta ENDOSCOPY  2/12/2015    [facility] ENDOSCOPY N/A 8/5/2019    Dr Junior Iglesias has non-erosive GERD with or without Esophageal dysmotility       Social History:  Social History     Tobacco Use    Smoking status: Never Smoker    Smokeless tobacco: Never Used   Substance Use Topics    Alcohol use: Yes     Comment: couple times per week    Drug use: No       Vital Signs: There were no vitals filed for this visit. Physical Exam:  Cardiac:  [x]WNL  []Comments:  Pulmonary:  [x]WNL   []Comments:  Neuro/Mental Status:  [x]WNL  []Comments:  Abdominal:  [x]WNL    []Comments:  Other:   []WNL  []Comments:    Informed Consent:  The risks and benefits of the procedure have been discussed with either the patient or if they cannot consent, their representative. Assessment:  Patient examined and appropriate for planned sedation and procedure. Plan:  Proceed with planned sedation and procedure as above.     Dusty Emmanuel am scribing for and in the presence of Dr. Clifton Martínez MD.  Electronically signed by Edel Castaneda RN on 4/22/2021 at 8:05 AM    I personally performed the services described in this documentation as scribed by Destiny Torrez, and it appears accurate and complete.      Lukas Long MD  4/22/2021

## 2021-04-26 ENCOUNTER — HOSPITAL ENCOUNTER (OUTPATIENT)
Dept: WOMENS IMAGING | Age: 56
Discharge: HOME | End: 2021-04-26
Payer: COMMERCIAL

## 2021-04-26 DIAGNOSIS — Z12.31 BREAST CANCER SCREENING BY MAMMOGRAM: ICD-10-CM

## 2021-04-26 PROCEDURE — 77067 SCR MAMMO BI INCL CAD: CPT

## 2021-05-03 ENCOUNTER — HOSPITAL ENCOUNTER (OUTPATIENT)
Age: 56
Setting detail: SPECIMEN
Discharge: HOME | End: 2021-05-03
Payer: COMMERCIAL

## 2021-05-03 PROCEDURE — 88312 SPECIAL STAINS GROUP 1: CPT

## 2021-05-03 PROCEDURE — 88321 CONSLTJ&REPRT SLD PREP ELSWR: CPT

## 2021-05-03 PROCEDURE — 88305 TISSUE EXAM BY PATHOLOGIST: CPT

## 2021-07-12 ENCOUNTER — TELEPHONE (OUTPATIENT)
Dept: INTERNAL MEDICINE | Age: 56
End: 2021-07-12

## 2021-07-12 RX ORDER — AMOXICILLIN AND CLAVULANATE POTASSIUM 875; 125 MG/1; MG/1
1 TABLET, FILM COATED ORAL 2 TIMES DAILY
Qty: 20 TABLET | Refills: 0 | Status: SHIPPED | OUTPATIENT
Start: 2021-07-12 | End: 2021-07-22

## 2021-07-12 NOTE — TELEPHONE ENCOUNTER
Pt called and stated that she has a sinus infection. SHe has no fever, but lots of pressure in her face and just all around feels bad. Pt would like something sent in please.

## 2021-07-12 NOTE — TELEPHONE ENCOUNTER
Pt notified, please sign pended medication. She is in [place] at their summer home and she needs it sent there.

## 2021-08-05 DIAGNOSIS — N39.46 MIXED STRESS AND URGE URINARY INCONTINENCE: Primary | ICD-10-CM

## 2021-08-27 ENCOUNTER — HOSPITAL ENCOUNTER (OUTPATIENT)
Dept: GENERAL RADIOLOGY | Age: 56
Discharge: HOME | End: 2021-08-27
Payer: COMMERCIAL

## 2021-08-27 ENCOUNTER — OFFICE VISIT (OUTPATIENT)
Dept: INTERNAL MEDICINE | Age: 56
End: 2021-08-27
Payer: COMMERCIAL

## 2021-08-27 VITALS
BODY MASS INDEX: 22.16 KG/M2 | HEART RATE: 81 BPM | HEIGHT: 67 IN | SYSTOLIC BLOOD PRESSURE: 96 MMHG | OXYGEN SATURATION: 96 % | DIASTOLIC BLOOD PRESSURE: 62 MMHG | RESPIRATION RATE: 18 BRPM | WEIGHT: 141.2 LBS

## 2021-08-27 DIAGNOSIS — G89.29 CHRONIC LOW BACK PAIN, UNSPECIFIED BACK PAIN LATERALITY, UNSPECIFIED WHETHER SCIATICA PRESENT: ICD-10-CM

## 2021-08-27 DIAGNOSIS — E55.9 VITAMIN D DEFICIENCY: ICD-10-CM

## 2021-08-27 DIAGNOSIS — H93.12 TINNITUS OF LEFT EAR: ICD-10-CM

## 2021-08-27 DIAGNOSIS — M54.50 CHRONIC LOW BACK PAIN, UNSPECIFIED BACK PAIN LATERALITY, UNSPECIFIED WHETHER SCIATICA PRESENT: ICD-10-CM

## 2021-08-27 DIAGNOSIS — K21.9 GASTROESOPHAGEAL REFLUX DISEASE WITHOUT ESOPHAGITIS: ICD-10-CM

## 2021-08-27 DIAGNOSIS — Z00.00 ROUTINE HEALTH MAINTENANCE: Primary | ICD-10-CM

## 2021-08-27 DIAGNOSIS — H92.02 LEFT EAR PAIN: ICD-10-CM

## 2021-08-27 DIAGNOSIS — E03.9 HYPOTHYROIDISM, UNSPECIFIED TYPE: ICD-10-CM

## 2021-08-27 DIAGNOSIS — M25.551 RIGHT HIP PAIN: ICD-10-CM

## 2021-08-27 DIAGNOSIS — Z91.09 ENVIRONMENTAL ALLERGIES: ICD-10-CM

## 2021-08-27 DIAGNOSIS — Z12.4 SCREENING FOR CERVICAL CANCER: ICD-10-CM

## 2021-08-27 PROCEDURE — 73502 X-RAY EXAM HIP UNI 2-3 VIEWS: CPT

## 2021-08-27 PROCEDURE — 99396 PREV VISIT EST AGE 40-64: CPT | Performed by: INTERNAL MEDICINE

## 2021-08-27 RX ORDER — MONTELUKAST SODIUM 10 MG/1
10 TABLET ORAL NIGHTLY
Qty: 30 TABLET | Refills: 5 | Status: SHIPPED | OUTPATIENT
Start: 2021-08-27 | End: 2022-03-24

## 2021-08-27 RX ORDER — ALBUTEROL SULFATE 90 UG/1
2 AEROSOL, METERED RESPIRATORY (INHALATION) EVERY 4 HOURS PRN
COMMUNITY
Start: 2021-07-27 | End: 2022-03-24

## 2021-08-27 NOTE — PROGRESS NOTES
Chief Complaint   Patient presents with    Annual Exam    Hip Pain     Patient complains of (R) sided hip pain that started last fall. Earlier this year the pain started going down her leg to her knee and occasionally she will get a \"lighting bolt\" of pain in her big toe. She denies any numbness or tingling.  Sinus Problem     She complains of sinus drainage.  Tinnitus     She complains of ringing in her ears.  Discuss Medications     She stopped her hormones recently due to having a period every 3 weeks and spotting between that. HPI: Shannan Garay is a 48 y.o. female is here for annual physical exam.  Her functional status is good. She denies any history of recent falls. She has no concerns in regards to safety, hearing, or cognition. She does not see any other healthcare providers on a routine basis. She does complain of some right-sided hip pain that started this fall. The pain started going down to her knee earlier this year and sometimes, she will get a lightening bolt sensation of pain in her big toe. She denies any complaints of numbness or tingling. She also complains of some sinus problems. She has sinus drainage and ringing in both of her ears. She does have fluid behind both tympanic membranes. We will try some Singulair. She stopped taking her hormones due to having breakthrough periods. She is having some hot flashes. Her thyroid function remains stable. She does have a history of back pain, and she does take Zanaflex as needed. She also has a history of vitamin D deficiency and is on ergocalciferol. Her acid reflux is fairly well controlled. She does have a history of COVID-19. She still has a catch in her lungs at times. However, overall, she has recovered nicely from her COVID-19. She is taking Advil for the hip pain. She developed a left ear pain after playing pickle ball with a partner that did have some sinus pressure and pain.     Routine screening is as follows:  Eye exam yearly  Flu vaccine October 2021  Pap done today  Mammogram April 2021  Colonoscopy 2021    Past Medical History:   Diagnosis Date    Acid reflux     Anemia     BRCA negative     Chronic back pain     GERD (gastroesophageal reflux disease)     Hypothyroidism     Irritable bowel syndrome     Premature atrial contraction     Skipped heart beats     Thyroid disease     Thyroid mass     Vitamin D deficiency       Past Surgical History:   Procedure Laterality Date    BREAST ENHANCEMENT SURGERY      BREAST SURGERY      Augmentation,Biopsy+    CHOLECYSTECTOMY      COLONOSCOPY  2008???    [facility]    COLONOSCOPY  12/12/2012        COLONOSCOPY N/A 04/22/2021    Dr Ace Flores, 5 yr recall    HEMORRHOID SURGERY      THYROID SURGERY      Biopsy    THYROID SURGERY      nodule removed    TONSILLECTOMY      TUBAL LIGATION      UPPER GASTROINTESTINAL ENDOSCOPY  2008? ?     Dr Llamas Herrera  02/12/2015        UPPER GASTROINTESTINAL ENDOSCOPY N/A 08/05/2019    Dr Carole Goodell has non-erosive GERD with or without Esophageal dysmotility      Social History     Socioeconomic History    Marital status:      Spouse name: None    Number of children: None    Years of education: None    Highest education level: None   Occupational History    None   Tobacco Use    Smoking status: Never Smoker    Smokeless tobacco: Never Used   Vaping Use    Vaping Use: Never used   Substance and Sexual Activity    Alcohol use: Yes     Comment: couple times per week    Drug use: No    Sexual activity: Yes     Partners: Male     Birth control/protection: Surgical   Other Topics Concern    None   Social History Narrative    ** Merged History Encounter **          Social Determinants of Health     Financial Resource Strain: Low Risk     Difficulty of Paying Living Expenses: Not hard at all   Food Insecurity: No Food Insecurity    Worried About Running Out of Food in the Last Year: Never true    Ran Out of Food in the Last Year: Never true   Transportation Needs: No Transportation Needs    Lack of Transportation (Medical): No    Lack of Transportation (Non-Medical):  No   Physical Activity:     Days of Exercise per Week:     Minutes of Exercise per Session:    Stress:     Feeling of Stress :    Social Connections:     Frequency of Communication with Friends and Family:     Frequency of Social Gatherings with Friends and Family:     Attends Zoroastrian Services:     Active Member of Clubs or Organizations:     Attends Club or Organization Meetings:     Marital Status:    Intimate Partner Violence:     Fear of Current or Ex-Partner:     Emotionally Abused:     Physically Abused:     Sexually Abused:       Family History   Problem Relation Age of Onset    Other Mother         brain bleed    Dementia Mother     High Blood Pressure Mother     Lung Cancer Father          54, lung cancer    Cancer Father         Lung-Smoker    Breast Cancer Sister 50         at 47, did not wake up after a nap    Heart Attack Sister     Other Other         brain aneurysm    Colon Cancer Other     Cancer Maternal Grandfather         rectal cancer    Colon Cancer Maternal Grandfather     Colon Polyps Maternal Grandfather     Esophageal Cancer Neg Hx     Liver Cancer Neg Hx     Liver Disease Neg Hx     Rectal Cancer Neg Hx     Stomach Cancer Neg Hx         Current Outpatient Medications   Medication Sig Dispense Refill    albuterol sulfate  (90 Base) MCG/ACT inhaler Inhale 2 puffs into the lungs every 4 hours as needed      Triamcinolone Acetonide (NASACORT ALLERGY 24HR NA) by Nasal route daily as needed      montelukast (SINGULAIR) 10 MG tablet Take 1 tablet by mouth nightly 30 tablet 5    levothyroxine (SYNTHROID) 75 MCG tablet TAKE 1 TABLET BY MOUTH ONE TIME A DAY 90 tablet 3    tiZANidine (ZANAFLEX) 4 MG tablet Take 1 tablet by mouth every 6 hours as needed (muscle spasms) 60 tablet 0    vitamin D (ERGOCALCIFEROL) 1.25 MG (28146 UT) CAPS capsule TAKE 1 CAPSULE BY MOUTH ONCE A WEEK 12 capsule 3    lansoprazole (PREVACID) 30 MG delayed release capsule TAKE ONE CAPSULE TWO TIMES A  capsule 3    loratadine (CLARITIN) 10 MG capsule Take 10 mg by mouth daily      Alum Hydroxide-Mag Carbonate (GAVISCON PO) Take by mouth nightly       conjugated estrogens (PREMARIN) 0.625 MG/GM vaginal cream Place 0.5 g vaginally daily (Patient not taking: Reported on 8/27/2021) 1 Tube 3     No current facility-administered medications for this visit. Patient Active Problem List   Diagnosis    LLQ abdominal pain    Chronic constipation    Epigastric pressure    Heartburn    Indigestion    Acid reflux    Esophageal spasm    Hemorrhoids    Chest pain    Breast tenderness in female    Breast mass, left    History of breast augmentation    Iron deficiency anemia secondary to inadequate dietary iron intake        Review of Systems   Constitutional: Negative for activity change, appetite change, chills, diaphoresis, fatigue, fever and unexpected weight change. HENT: Positive for ear pain and sinus pressure. Negative for congestion, facial swelling, hearing loss, mouth sores, nosebleeds, postnasal drip, rhinorrhea, sneezing, sore throat, tinnitus, trouble swallowing and voice change. Eyes: Negative for photophobia, pain, discharge, redness, itching and visual disturbance. Respiratory: Negative for cough, choking, chest tightness, shortness of breath and wheezing. Cardiovascular: Negative for chest pain, palpitations and leg swelling. Gastrointestinal: Negative for abdominal distention, abdominal pain, anal bleeding, blood in stool, constipation, diarrhea, nausea, rectal pain and vomiting. Endocrine: Negative for cold intolerance, heat intolerance, polydipsia, polyphagia and polyuria.    Genitourinary: Negative for decreased urine volume, difficulty urinating, dysuria, flank pain, frequency, hematuria and urgency. Musculoskeletal: Positive for arthralgias. Negative for back pain, gait problem, joint swelling, myalgias, neck pain and neck stiffness. Skin: Negative for color change, pallor and rash. Allergic/Immunologic: Negative for environmental allergies and food allergies. Neurological: Negative for dizziness, tremors, syncope, weakness, light-headedness, numbness and headaches. Hematological: Negative for adenopathy. Does not bruise/bleed easily. Psychiatric/Behavioral: Negative for agitation, behavioral problems, confusion, decreased concentration, dysphoric mood, hallucinations, self-injury, sleep disturbance and suicidal ideas. The patient is not nervous/anxious and is not hyperactive. BP 96/62 (Site: Left Upper Arm, Position: Sitting)   Pulse 81   Resp 18   Ht 5' 7\" (1.702 m)   Wt 141 lb 3.2 oz (64 kg)   SpO2 96%   BMI 22.12 kg/m²   Physical Exam  Vitals and nursing note reviewed. Exam conducted with a chaperone present. Constitutional:       General: She is not in acute distress. Appearance: Normal appearance. She is normal weight. She is not ill-appearing, toxic-appearing or diaphoretic. HENT:      Head: Normocephalic and atraumatic. Right Ear: Tympanic membrane, ear canal and external ear normal. There is no impacted cerumen. Left Ear: Ear canal and external ear normal. There is no impacted cerumen. Ears:      Comments: There is some fluid behind the left tympanic membrane     Nose: Nose normal. No congestion or rhinorrhea. Mouth/Throat:      Mouth: Mucous membranes are moist.      Pharynx: Oropharynx is clear. No oropharyngeal exudate or posterior oropharyngeal erythema. Eyes:      General: No scleral icterus. Right eye: No discharge. Left eye: No discharge. Extraocular Movements: Extraocular movements intact.       Conjunctiva/sclera: Conjunctivae normal.      Pupils: Pupils are equal, round, and reactive to light. Neck:      Vascular: No carotid bruit. Cardiovascular:      Rate and Rhythm: Normal rate and regular rhythm. Pulses: Normal pulses. Heart sounds: Normal heart sounds. No murmur heard. No friction rub. No gallop. Pulmonary:      Effort: Pulmonary effort is normal. No respiratory distress. Breath sounds: Normal breath sounds. No stridor. No wheezing, rhonchi or rales. Chest:      Chest wall: No tenderness. Abdominal:      General: There is no distension. Tenderness: There is no abdominal tenderness. There is no guarding. Hernia: There is no hernia in the left inguinal area or right inguinal area. Genitourinary:     Exam position: Lithotomy position. Pubic Area: No rash. Labia:         Right: No rash, tenderness, lesion or injury. Left: No rash, tenderness, lesion or injury. Urethra: No prolapse, urethral pain, urethral swelling or urethral lesion. Vagina: Normal.      Cervix: Normal.      Uterus: Normal.       Adnexa: Right adnexa normal and left adnexa normal.      Comments: Breasts: breasts appear normal, no suspicious masses, no skin or nipple changes or axillary nodes. Musculoskeletal:         General: Tenderness present. No swelling, deformity or signs of injury. Cervical back: Normal range of motion and neck supple. No rigidity. No muscular tenderness. Right lower leg: No edema. Left lower leg: No edema. Comments: There is some tenderness on palpation of the right hip. Lymphadenopathy:      Cervical: No cervical adenopathy. Lower Body: No right inguinal adenopathy. No left inguinal adenopathy. Skin:     General: Skin is warm and dry. Capillary Refill: Capillary refill takes less than 2 seconds. Neurological:      General: No focal deficit present. Mental Status: She is alert and oriented to person, place, and time.  Mental status is at baseline. Psychiatric:         Mood and Affect: Mood normal.         Behavior: Behavior normal.         Thought Content: Thought content normal.         Judgment: Judgment normal.         1. Routine health maintenance    2. Screening for cervical cancer    3. Right hip pain    4. Hypothyroidism, unspecified type    5. Environmental allergies    6. Tinnitus of left ear    7. Left ear pain    8. Chronic low back pain, unspecified back pain laterality, unspecified whether sciatica present    9. Gastroesophageal reflux disease without esophagitis    10. Vitamin D deficiency        ASSESSMENT/PLAN:    80-year-old woman here for follow-up    1. Health maintenance: Routine screening is as per HPI. Labs discussed with patient today    2. Environmental allergies: Albuterol as needed. Try Singulair. Continue Nasacort. 3.  Tinnitus/left ear pain: She does have some fluid behind the left tympanic membrane. Hopefully, the Singulair will help with this. 4.  Right hip pain: X-ray ordered if hip x-rays negative, will consider MRI of the hip for further evaluation of the numbness and shooting pains down her leg. It sounds like there is a nerve impingement syndrome of some sort    5. Hypothyroidism: Continue Synthroid at current dose    6. Lumbago: Zanaflex as needed    7. Acid reflux: Continue lansoprazole     8. Vitamin D deficiency: Continue ergocalciferol    Solomon Nelson was seen today for annual exam, hip pain, sinus problem, tinnitus and discuss medications. Diagnoses and all orders for this visit:    Routine health maintenance    Screening for cervical cancer  -     PAP SMEAR    Right hip pain  -     XR HIP 2-3 VW W PELVIS RIGHT; Future    Hypothyroidism, unspecified type  -     CBC Auto Differential; Future  -     Comprehensive Metabolic Panel; Future  -     Lipid Panel; Future  -     TSH without Reflex; Future  -     Vitamin D 25 Hydroxy;  Future    Environmental allergies    Tinnitus of left ear    Left ear pain    Chronic low back pain, unspecified back pain laterality, unspecified whether sciatica present    Gastroesophageal reflux disease without esophagitis    Vitamin D deficiency    Other orders  -     montelukast (SINGULAIR) 10 MG tablet; Take 1 tablet by mouth nightly          Return in about 6 months (around 2/27/2022). Orders Placed This Encounter   Procedures    XR HIP 2-3 VW W PELVIS RIGHT     Standing Status:   Future     Number of Occurrences:   1     Standing Expiration Date:   8/27/2022     Order Specific Question:   Reason for exam:     Answer:   right hip pain    PAP SMEAR     Patient History:    No LMP recorded. OBGYN Status: Having periods  Past Surgical History:  No date: BREAST ENHANCEMENT SURGERY  No date: BREAST SURGERY      Comment:  Augmentation,Biopsy+  No date: CHOLECYSTECTOMY  2008???: COLONOSCOPY      Comment:  Enriqueta Posey  12/12/2012: COLONOSCOPY      Comment:  Manny Wiley  04/22/2021: COLONOSCOPY; N/A      Comment:  Dr Chalra Kumar, 5 yr recall  No date: HEMORRHOID SURGERY  No date: THYROID SURGERY      Comment:  Biopsy  No date: THYROID SURGERY      Comment:  nodule removed  No date: TONSILLECTOMY  No date: TUBAL LIGATION  2008? ?: UPPER GASTROINTESTINAL ENDOSCOPY      Comment:  Dr Enriqueta Posey  02/12/2015: UPPER GASTROINTESTINAL ENDOSCOPY      Comment:  Manny Wiley  08/05/2019: UPPER GASTROINTESTINAL ENDOSCOPY; N/A      Comment:  Dr Mariah Cruz has non-erosive GERD with or                without Esophageal dysmotility      Social History    Tobacco Use      Smoking status: Never Smoker      Smokeless tobacco: Never Used       Order Specific Question:   Collection Type     Answer: Thin Prep     Order Specific Question:   Prior Abnormal Pap Test     Answer:   No     Order Specific Question:   Screening or Diagnostic     Answer:   Screening     Order Specific Question:   HPV Requested?      Answer:   Yes - If Abnormal Reflex HPV     Order Specific Question:   High Risk Patient     Answer:   N/A    CBC Auto Differential     Fast 12 hours     Standing Status:   Future     Standing Expiration Date:   8/27/2022    Comprehensive Metabolic Panel     Fasting 12 hours     Standing Status:   Future     Standing Expiration Date:   8/27/2022    Lipid Panel     Standing Status:   Future     Standing Expiration Date:   8/27/2022     Order Specific Question:   Is Patient Fasting?/# of Hours     Answer:   12    TSH without Reflex     Fast 12 hours     Standing Status:   Future     Standing Expiration Date:   8/27/2022    Vitamin D 25 Hydroxy     Standing Status:   Future     Standing Expiration Date:   8/27/2022       Johnny Ceballos MD

## 2021-08-27 NOTE — PATIENT INSTRUCTIONS
Patient Education      Patient Education        Allergies: Care Instructions  Your Care Instructions     Allergies occur when your body's defense system (immune system) overreacts to certain substances. The immune system treats a harmless substance as if it were a harmful germ or virus. Many things can make this happen. These include pollens, medicine, food, dust, animal dander, and mold. Allergies can be mild or severe. Mild allergies can be managed with home treatment. But medicine may be needed to prevent problems. Managing your allergies is an important part of staying healthy. Your doctor may suggest that you have allergy testing to help find out what is causing your allergies. Severe allergies can cause reactions that affect your whole body (anaphylactic reactions). Your doctor may prescribe a shot of epinephrine to carry with you in case you have a severe reaction. Learn how to give yourself the shot and keep it with you at all times. Make sure it is not . Follow-up care is a key part of your treatment and safety. Be sure to make and go to all appointments, and call your doctor if you are having problems. It's also a good idea to know your test results and keep a list of the medicines you take. How can you care for yourself at home? · If you have been told by your doctor that dust or dust mites are causing your allergy, decrease the dust around your bed:  ? Wash sheets, pillowcases, and other bedding in hot water every week. ? Use dust-proof covers for pillows, duvets, and mattresses. Avoid plastic covers because they tear easily and do not \"breathe. \" Wash as instructed on the label. ? Do not use any blankets and pillows that you do not need. ? Use blankets that you can wash in your washing machine. ? Consider removing drapes and carpets, which attract and hold dust, from your bedroom. · If you are allergic to house dust and mites, do not use home humidifiers.  Your doctor can suggest ways you can control dust and mites. · Look for signs of cockroaches. Cockroaches cause allergic reactions. Use cockroach baits to get rid of them. Then, clean your home well. Cockroaches like areas where grocery bags, newspapers, empty bottles, or cardboard boxes are stored. Do not keep these inside your home, and keep trash and food containers sealed. Seal off any spots where cockroaches might enter your home. · If you are allergic to mold, get rid of furniture, rugs, and drapes that smell musty. Check for mold in the bathroom. · If you are allergic to outdoor pollen or mold spores, use air-conditioning. Change or clean all filters every month. Keep windows closed. · If you are allergic to pollen, stay inside when pollen counts are high. Use a vacuum  with a HEPA filter or a double-thickness filter at least two times each week. · Stay inside when air pollution is bad. Avoid paint fumes, perfumes, and other strong odors. · Avoid conditions that make your allergies worse. Stay away from smoke. Do not smoke or let anyone else smoke in your house. Do not use fireplaces or wood-burning stoves. · If you are allergic to your pets, change the air filter in your furnace every month. Use high-efficiency filters. · If you are allergic to pet dander, keep pets outside or out of your bedroom. Old carpet and cloth furniture can hold a lot of animal dander. You may need to replace them. When should you call for help? Give an epinephrine shot if:    · You think you are having a severe allergic reaction.     · You have symptoms in more than one body area, such as mild nausea and an itchy mouth. After giving an epinephrine shot call 911, even if you feel better. Call 911 if:    · You have symptoms of a severe allergic reaction. These may include:  ? Sudden raised, red areas (hives) all over your body. ? Swelling of the throat, mouth, lips, or tongue. ? Trouble breathing. ? Passing out (losing consciousness).  Or you may feel very lightheaded or suddenly feel weak, confused, or restless.     · You have been given an epinephrine shot, even if you feel better. Call your doctor now or seek immediate medical care if:    · You have symptoms of an allergic reaction, such as:  ? A rash or hives (raised, red areas on the skin). ? Itching. ? Swelling. ? Belly pain, nausea, or vomiting. Watch closely for changes in your health, and be sure to contact your doctor if:    · You do not get better as expected. Where can you learn more? Go to https://Okta.[employer] Systems. org and sign in to your [employer] account. Enter O182 in the Aniboom box to learn more about \"Allergies: Care Instructions. \"     If you do not have an account, please click on the \"Sign Up Now\" link. Current as of: February 10, 2021               Content Version: 12.9  © 2006-2021 [employer]. Care instructions adapted under license by [facility] ([facility]). If you have questions about a medical condition or this instruction, always ask your healthcare professional. [place] [address] any warranty or liability for your use of this information. Tinnitus: Care Instructions  Overview     Many people have some ringing sounds in their ears once in a while. You may hear a roar, a hiss, a tinkle, or a buzz. The sound usually lasts only a few minutes. Ringing in the ears that doesn't get better or go away is called tinnitus. Tinnitus is usually caused by long-term exposure to loud noise. This damages the nerves in the inner ear. It can occur with all types of hearing loss. It may be a symptom of almost any ear problem. Tinnitus may be caused by a buildup of earwax. Or it may be caused by ear infections or certain medicines (especially antibiotics or large amounts of aspirin). You can also hear noises in your ears because of an injury to the ears, drinking too much alcohol or caffeine, or a medical condition.   You may need tests to evaluate your hearing and to find causes of long-lasting tinnitus. Your doctor may suggest one or more treatments to help you cope with it. You can also do things at home to help reduce symptoms. Follow-up care is a key part of your treatment and safety. Be sure to make and go to all appointments, and call your doctor if you are having problems. It's also a good idea to know your test results and keep a list of the medicines you take. How can you care for yourself at home? · Limit or cut out alcohol and caffeine. They can make your symptoms worse. · Do not smoke or use other tobacco products. Nicotine reduces blood flow to the ear and makes tinnitus worse. If you need help quitting, talk to your doctor about stop-smoking programs and medicines. These can increase your chances of quitting for good. · Talk to your doctor about whether to stop taking aspirin and similar products such as ibuprofen or naproxen. · Get exercise often. It can improve blood flow to the ear. Ways to cope with noise  Some tinnitus may last a long time. To cope with noise, try to:  · Avoid noises that you think caused your tinnitus. If you can't avoid loud noises, wear earplugs or earmuffs. · Ignore the sound by paying attention to other things. · Relax using biofeedback, meditation, or yoga. Feeling stressed and being tired can make tinnitus worse. · Play music or white noise to help you sleep. Background noise may cover up the noise that you hear in your ears. You can buy a machine that makes soothing sounds, such as ocean waves. When should you call for help? Call 911 anytime you think you may need emergency care. For example, call if:    · You have symptoms of a stroke. These may include:  ? Sudden numbness, tingling, weakness, or loss of movement in your face, arm, or leg, especially on only one side of your body. ? Sudden vision changes. ? Sudden trouble speaking.   ? Sudden confusion or trouble understanding simple statements. ? Sudden problems with walking or balance. ? A sudden, severe headache that is different from past headaches. Call your doctor now or seek immediate medical care if:    · You develop other symptoms. These may include hearing loss (or worse hearing loss), balance problems, dizziness, nausea, or vomiting. Watch closely for changes in your health, and be sure to contact your doctor if:    · Your tinnitus moves from both ears to one ear.     · Your hearing loss gets worse within 1 day after an ear injury.     · Your tinnitus or hearing loss does not get better within 1 week after an ear injury.     · Your tinnitus bothers you enough that you want to take medicines to help you cope with it. Where can you learn more? Go to https://Pick1peScaleGrideb.[employer]vyshot. org and sign in to your [employer] account. Enter S165 in the [employer] box to learn more about \"Tinnitus: Care Instructions. \"     If you do not have an account, please click on the \"Sign Up Now\" link. Current as of: December 2, 2020               Content Version: 12.9  © 2006-2021 [employer]. Care instructions adapted under license by [facility] ([facility]). If you have questions about a medical condition or this instruction, always ask your healthcare professional. [place] [address] any warranty or liability for your use of this information.

## 2021-08-28 ASSESSMENT — ENCOUNTER SYMPTOMS
DIARRHEA: 0
CONSTIPATION: 0
VOICE CHANGE: 0
FACIAL SWELLING: 0
SORE THROAT: 0
ABDOMINAL DISTENTION: 0
EYE PAIN: 0
CHOKING: 0
VOMITING: 0
EYE ITCHING: 0
COUGH: 0
ABDOMINAL PAIN: 0
TROUBLE SWALLOWING: 0
BLOOD IN STOOL: 0
SHORTNESS OF BREATH: 0
ANAL BLEEDING: 0
RHINORRHEA: 0
WHEEZING: 0
EYE REDNESS: 0
NAUSEA: 0
COLOR CHANGE: 0
BACK PAIN: 0
EYE DISCHARGE: 0
SINUS PRESSURE: 1
CHEST TIGHTNESS: 0
PHOTOPHOBIA: 0
RECTAL PAIN: 0

## 2021-08-30 ENCOUNTER — HOSPITAL ENCOUNTER (OUTPATIENT)
Dept: PHYSICAL THERAPY | Age: 56
Setting detail: THERAPIES SERIES
Discharge: HOME | End: 2021-08-30
Payer: COMMERCIAL

## 2021-08-30 PROCEDURE — 97110 THERAPEUTIC EXERCISES: CPT

## 2021-08-30 PROCEDURE — 97162 PT EVAL MOD COMPLEX 30 MIN: CPT

## 2021-08-30 PROCEDURE — 97163 PT EVAL HIGH COMPLEX 45 MIN: CPT

## 2021-08-30 NOTE — PROGRESS NOTES
Physical Therapy  Initial Assessment  Date: 2021  Patient Name: Kenya Haq  MRN: [id number]  : 1965     Treatment Diagnosis: muscle weakness, stress incontinence         Subjective   General  Chart Reviewed: Yes  Patient assessed for rehabilitation services?: Yes  Response To Previous Treatment: Not applicable  Family / Caregiver Present: No  Referring Practitioner: Yrn Munguia  Referral Date : 21  Diagnosis: Mixed stress and urge urinary incontinence N39.46  Follows Commands: Within Functional Limits  PT Visit Information  Onset Date: 21  PT Insurance Information: Medical [place]  Total # of Visits Approved: 24  Total # of Visits to Date: 1  Plan of Care/Certification Expiration Date: 21  Progress Note Due Date: 21  Subjective  Subjective: Patient states she has had urinary incontinence for a long time, but varies based on her liquid, caffeine. She says sometimes she dribbles when she stands up after using the bathroom. She says she also has some bowel urgency at times. She says that she has urine leakage mostly with activity. SHe says the leakage does occassionally occur with urge. She says that she wears a pad when she plays sports.   Pain Screening  Patient Currently in Pain: No  Vital Signs  Patient Currently in Pain: No         Orientation  Orientation  Overall Orientation Status: Within Normal Limits    Social/Functional History  Social/Functional History  Lives With: Spouse  Type of Home: House  ADL Assistance: Independent  Homemaking Assistance: Independent  Active : Yes  Occupation: Unemployed  Leisure & Hobbies: has leakage with sports including pickle ball  IADL Comments: sometimes she has to take breaks intermittently with driving, will have to use the bathroom when she is out    Objective     Observation/Palpation  Posture: Good  Palpation: slight tenderness to right low back region at L5-S1 region, increased muscular tension and tenderness to right psoas, quad and lateral hip region throughout proximal region, tenderness to right greater trochanter  Observation: left asis anteriorly rotated and right posteriorly rotated, corrected with mwm    PROM RLE (degrees)  RLE PROM: WNL  AROM RLE (degrees)  RLE AROM: WNL  PROM LLE (degrees)  LLE PROM: WNL  AROM LLE (degrees)  LLE AROM : WNL    Strength RLE  R Hip Flexion: 5/5  R Hip ABduction: 5/5  R Hip ADduction: 5/5  R Hip Internal Rotation: 5/5  R Hip External Rotation: 5/5  R Knee Flexion: 5/5  R Knee Extension: 5/5  R Ankle Dorsiflexion: 5/5  R Ankle Plantar flexion: 5/5  Strength LLE  L Hip Flexion: 5/5  L Hip ABduction: 5/5  L Hip ADduction: 5/5  L Hip Internal Rotation: 5/5  L Hip External Rotation: 5/5  L Knee Flexion: 5/5  L Knee Extension: 5/5  L Ankle Dorsiflexion: 5/5  L Ankle Plantar Flexion: 5/5     Additional Measures  Other: PFIQ-7 20.63% impairment total, urine section 42.85% impairment  Sensation  Overall Sensation Status: WNL                   Exercises  Exercise 1: supine legs up wall  Exercise 2: supine ta contraction alone 5 sec x 5, alt ue 1 x 5, alt le 1 x 5, alt ue/le 1 x 5  Exercise 3: supine knee push isometric into hip ext on left 5 sec x5, into hip flex on right 5 sec x 5  Exercise 4: supine pelvic tilts x 0  Exercise 5: supine bridge x 0  Exercise 6: supine single leg bridge x 0  Exercise 7: supine right hip psoas, quad, IT band stm x 3 min  Exercise 8: supine right hip modified maribel stretch x 0  Exercise 9: sidelying right hip obers stretch x 0  Exercise 10: supine distal right le distraction x 0  Exercise 11: supine lateral hip distraction x 0  Exercise 12: sit to stand with ta contraction x 0  Exercise 13: t-ball ta contraction alone, with alt ue, with alt le, with alt ue/le x0   *add rodeo and walk outs as able  Exercise 14: supine in slight trendelenburg pfm downtraining on biofeedback x 0  Exercise 15: supine in slight trendelenburg pfm contraction on biofeedback alone, with hip Manipulation, Functional Mobility Training, Pain Management, Positioning, Modalities, Patient/Caregiver Education & Training    Goals  Short term goals  Time Frame for Short term goals: 6 Weeks  Short term goal 1: Patient to have PERF score 2+/10//15/20  Short term goal 2: Patient to have good lumbopelvic stabilization in hooklying alone  Short term goal 3: Patient to have report of decreased saturation of pad when playing pickle ball  Short term goal 4: Patient to be independent with HEP  Long term goals  Time Frame for Long term goals : 12 Weeks  Long term goal 1: Patient to have decreased urinary incontinence by 50%  Long term goal 2: Patient to report decreased urgency and frequency of urine by 50%  Long term goal 3: Patient to have good lumbopelvic stabilizaiton in hooklying with ue/le activity  Long term goal 4: Patient to demo increase function demo'ed by scoring <= 15% impairment on PFIQ-7 urine section  Patient Goals   Patient goals : decrease urinary incontinence       Therapy Time   Individual Concurrent Group Co-treatment   Time In 1004         Time Out 1104         Minutes 60         Timed Code Treatment Minutes: 10 Minutes  Electronically signed by Zunilda Morrison PT on 8/30/2021 at 11:56 AM

## 2021-09-09 ENCOUNTER — APPOINTMENT (OUTPATIENT)
Dept: PHYSICAL THERAPY | Age: 56
End: 2021-09-09
Payer: COMMERCIAL

## 2021-09-13 ENCOUNTER — HOSPITAL ENCOUNTER (OUTPATIENT)
Dept: PHYSICAL THERAPY | Age: 56
Setting detail: THERAPIES SERIES
Discharge: HOME | End: 2021-09-13
Payer: COMMERCIAL

## 2021-09-13 PROCEDURE — 90913 BFB TRAINING EA ADDL 15 MIN: CPT

## 2021-09-13 PROCEDURE — 90912 BFB TRAINING 1ST 15 MIN: CPT

## 2021-09-13 PROCEDURE — 97110 THERAPEUTIC EXERCISES: CPT

## 2021-09-13 NOTE — PROGRESS NOTES
Physical Therapy  Daily Treatment Note  Date: 2021  Patient Name: Carlotta Scott  MRN: [id number]     :   1965    Subjective:   General  Chart Reviewed: Yes  Response To Previous Treatment: Not applicable  Family / Caregiver Present: No  Referring Practitioner: Alessandro Montiel  PT Visit Information  Onset Date: 21  PT Insurance Information: Medical [employer]  Total # of Visits Approved: 24  Total # of Visits to Date: 2  Plan of Care/Certification Expiration Date: 21  Progress Note Due Date: 21  Subjective  Subjective: Patient states she has her bladder diary and she only noticed leakage with playing pickle ball during the two days she logged. She says she has been having back and hip and left knee pain.   Pain Screening  Patient Currently in Pain: No  Vital Signs  Patient Currently in Pain: No       Treatment Activities:                                    Exercises  Exercise 1: supine legs up wall x 3 min  Exercise 2: supine ta contraction alone 5 sec x 5, alt ue 1 x 5, alt le 1 x 5, alt ue/le 1 x 5  Exercise 3: supine knee push isometric into hip ext on left 5 sec x5, into hip flex on right 5 sec x 5  Exercise 4: supine pelvic tilts 1 x 10  Exercise 5: supine bridge  1 x 10  Exercise 6: supine single leg bridge x 0  Exercise 7: supine right hip psoas, quad, IT band stm x 6 min  Exercise 8: supine right hip modified maribel stretch 4 x 15 sec  Exercise 9: sidelying right hip obers stretch x 0- not today  Exercise 10: supine distal right le distraction x 0  Exercise 11: supine lateral hip distraction x 0  Exercise 12: sit to stand with ta contraction x 0  Exercise 13: t-ball ta contraction alone, with alt ue, with alt le, with alt ue/le x0   *add rodeo and walk outs as able  Exercise 14: supine in slight trendelenburg pfm downtraining on biofeedback x 3 min  Exercise 15: supine in slight trendelenburg pfm contraction on biofeedback alone 10 sec x 5, with hip abd with t-band 10 sec x 5, with hip add with towel 10 sec x 5  Exercise 18: HEP issued 09/13/2021                               Assessment:   Conditions Requiring Skilled Therapeutic Intervention  Body structures, Functions, Activity limitations: Decreased functional mobility ; Decreased ADL status; Decreased strength;Decreased endurance;Decreased coordination  Assessment: Patient did well with tx today with mod v.c. for proper tech wtih ex's. She continues to have moderate muscular tension throughout right psoas, quad, and IT band region. She did well on biofeedback with initial difficulty relaxing with level 30-40 but with positional change and down training she relaxed at level 8-15. She was able to contract at level 10-30 throughout session wtih increased ability with accessory muscle use. She had good understanding of HEP and home education information.   Treatment Diagnosis: muscle weakness, stress incontinence  REQUIRES PT FOLLOW UP: Yes  Treatment Initiated : ther-ex    Goals:  Short term goals  Time Frame for Short term goals: 6 Weeks  Short term goal 1: Patient to have PERF score 2+/10//15/20  Short term goal 2: Patient to have good lumbopelvic stabilization in hooklying alone  Short term goal 3: Patient to have report of decreased saturation of pad when playing pickle ball  Short term goal 4: Patient to be independent with HEP  Long term goals  Time Frame for Long term goals : 12 Weeks  Long term goal 1: Patient to have decreased urinary incontinence by 50%  Long term goal 2: Patient to report decreased urgency and frequency of urine by 50%  Long term goal 3: Patient to have good lumbopelvic stabilizaiton in hooklying with ue/le activity  Long term goal 4: Patient to demo increase function demo'ed by scoring <= 15% impairment on PFIQ-7 urine section  Patient Goals   Patient goals : decrease urinary incontinence  Plan:    Plan  Times per week: 2x/week  Plan weeks: 12 weeks  Current Treatment Recommendations: Strengthening, ROM, Home Exercise Program, Safety Education & Training, Manual Therapy - Soft Tissue Mobilization, Neuromuscular Re-education, Manual Therapy - Joint Manipulation, Functional Mobility Training, Pain Management, Positioning, Modalities, Patient/Caregiver Education & Training  Timed Code Treatment Minutes: 77 Minutes     Therapy Time   Individual Concurrent Group Co-treatment   Time In 1009         Time Out 1115         Minutes 66         Timed Code Treatment Minutes: 66 Minutes  Electronically signed by Js Castillo PT on 9/13/2021 at 12:05 PM

## 2021-09-16 ENCOUNTER — APPOINTMENT (OUTPATIENT)
Dept: PHYSICAL THERAPY | Age: 56
End: 2021-09-16
Payer: COMMERCIAL

## 2021-09-20 ENCOUNTER — TELEPHONE (OUTPATIENT)
Dept: INTERNAL MEDICINE | Age: 56
End: 2021-09-20

## 2021-09-20 ENCOUNTER — HOSPITAL ENCOUNTER (OUTPATIENT)
Dept: PHYSICAL THERAPY | Age: 56
Setting detail: THERAPIES SERIES
Discharge: HOME | End: 2021-09-20
Payer: COMMERCIAL

## 2021-09-20 PROCEDURE — 97110 THERAPEUTIC EXERCISES: CPT

## 2021-09-20 PROCEDURE — 97140 MANUAL THERAPY 1/> REGIONS: CPT

## 2021-09-20 NOTE — TELEPHONE ENCOUNTER
Calling back to talk in reguards to physical therapy?  Also has a spot on bottocks she believes may be staph infection that she wanted to speak with someone about  Please call and advise

## 2021-09-20 NOTE — PROGRESS NOTES
Physical Therapy  Daily Treatment Note  Date: 2021  Patient Name: Remy Banks  MRN: [id number]     :   1965    Subjective:   General  Chart Reviewed: Yes  Response To Previous Treatment: Not applicable  Family / Caregiver Present: No  Referring Practitioner: Princess Baca  PT Visit Information  Onset Date: 21  PT Insurance Information: Medical [place]  Total # of Visits Approved: 24  Total # of Visits to Date: 3  Plan of Care/Certification Expiration Date: 21  Progress Note Due Date: 21  Subjective  Subjective: Patient states she has been using her home unit and doing well with it. She says she can tell that she is doing her kegel.   Pain Screening  Patient Currently in Pain: No  Vital Signs  Patient Currently in Pain: No       Treatment Activities:                                    Exercises  Exercise 1: supine legs up wall x 3 min  Exercise 2: supine ta contraction alone 5 sec x 5, alt ue 1 x 5, alt le 1 x 5, alt ue/le 1 x 5  Exercise 3: supine knee push isometric into hip ext on left 5 sec x5, into hip flex on right 5 sec x 5  Exercise 4: supine pelvic tilts 1 x 10  Exercise 5: supine bridge  1 x 10  Exercise 6: supine single leg bridge x 5 left side only due to right knee pain  Exercise 7: supine right hip psoas, quad, IT band stm x 6 min  Exercise 8: supine right hip modified maribel stretch 4 x 15 sec  Exercise 9: sidelying right hip obers stretch x 0- not today  Exercise 10: supine distal right le distraction x 0  Exercise 11: supine lateral hip distraction x 0  Exercise 12: sit to stand with ta contraction x 0  Exercise 13: t-ball ta contraction alone, with alt ue, with alt le, with alt ue/le x0   *add rodeo and walk outs as able  Exercise 14: supine in slight trendelenburg pfm downtraining on biofeedback x 3 min  Exercise 15: supine in slight trendelenburg pfm contraction on biofeedback alone 10 sec x 10, with hip abd with t-band 10 sec x 7, with hip add with towel 10 sec x 8- no machine today due to machine not working properly  Exercise 18: HEP issued 09/13/2021                               Assessment:   Conditions Requiring Skilled Therapeutic Intervention  Body structures, Functions, Activity limitations: Decreased functional mobility ; Decreased ADL status; Decreased strength;Decreased endurance;Decreased coordination  Assessment: Patient did well with tx today with decreased tension noted in her right psoas, quad and IT band region, but did still have min to mod tension noted and it was decreased after manual therapy. She was able to breathe better throughout ex's today, but unable to measure quality of contractions due to no biofeedback today. She did well with tx today overall.   Treatment Diagnosis: muscle weakness, stress incontinence  REQUIRES PT FOLLOW UP: Yes  Treatment Initiated : ther-ex    Goals:  Short term goals  Time Frame for Short term goals: 6 Weeks  Short term goal 1: Patient to have PERF score 2+/10//15/20  Short term goal 2: Patient to have good lumbopelvic stabilization in hooklying alone  Short term goal 3: Patient to have report of decreased saturation of pad when playing pickle ball  Short term goal 4: Patient to be independent with HEP  Long term goals  Time Frame for Long term goals : 12 Weeks  Long term goal 1: Patient to have decreased urinary incontinence by 50%  Long term goal 2: Patient to report decreased urgency and frequency of urine by 50%  Long term goal 3: Patient to have good lumbopelvic stabilizaiton in hooklying with ue/le activity  Long term goal 4: Patient to demo increase function demo'ed by scoring <= 15% impairment on PFIQ-7 urine section  Patient Goals   Patient goals : decrease urinary incontinence  Plan:    Plan  Times per week: 2x/week  Plan weeks: 12 weeks  Current Treatment Recommendations: Strengthening, ROM, Home Exercise Program, Safety Education & Training, Manual Therapy - Soft Tissue Mobilization, Neuromuscular Re-education, Manual Therapy - Joint Manipulation, Functional Mobility Training, Pain Management, Positioning, Modalities, Patient/Caregiver Education & Training  Timed Code Treatment Minutes: 61 Minutes     Therapy Time   Individual Concurrent Group Co-treatment   Time In 1007         Time Out 1110         Minutes 63         Timed Code Treatment Minutes: 63 Minutes  Electronically signed by Evangelina Gallagher PT on 9/20/2021 at 12:00 PM

## 2021-09-23 ENCOUNTER — HOSPITAL ENCOUNTER (OUTPATIENT)
Dept: PHYSICAL THERAPY | Age: 56
Setting detail: THERAPIES SERIES
Discharge: HOME | End: 2021-09-23
Payer: COMMERCIAL

## 2021-09-23 PROCEDURE — 97110 THERAPEUTIC EXERCISES: CPT

## 2021-09-23 PROCEDURE — 90913 BFB TRAINING EA ADDL 15 MIN: CPT

## 2021-09-23 PROCEDURE — 90912 BFB TRAINING 1ST 15 MIN: CPT

## 2021-09-23 NOTE — TELEPHONE ENCOUNTER
Pt left message stating she is going out of town in the morning and can't make the appt. She says the spot on her buttocks looks better.  She said she would send a My Chart message about the PT.

## 2021-09-23 NOTE — PROGRESS NOTES
Physical Therapy  Daily Treatment Note  Date: 2021  Patient Name: Juliana Rock  MRN: [id number]     :   1965    Subjective:   General  Chart Reviewed: Yes  Response To Previous Treatment: Not applicable  Family / Caregiver Present: No  Referring Practitioner: Juanjo Vásquez  PT Visit Information  Onset Date: 21  PT Insurance Information: Medical [place]  Total # of Visits Approved: 24  Total # of Visits to Date: 4  Plan of Care/Certification Expiration Date: 21  Progress Note Due Date: 21  Subjective  Subjective: Patient states she has been frustrated with her ex's. She says it is hard for her to tell if she is doing the ex's and the device she has wasn't saying she was performing the ex.   Pain Screening  Patient Currently in Pain: No  Vital Signs  Patient Currently in Pain: No       Treatment Activities:                                    Exercises  Exercise 1: supine legs up wall x 3 min  Exercise 2: supine ta contraction alone 5 sec x 5, alt ue 1 x 5, alt le 1 x 5, alt ue/le 1 x 5  Exercise 3: supine knee push isometric into hip ext on left 5 sec x5, into hip flex on right 5 sec x 5  Exercise 4: supine pelvic tilts 1 x 10  Exercise 5: supine bridge  1 x 10  Exercise 7: supine right hip psoas, quad, IT band stm x 6 min  Exercise 8: supine right hip modified maribel stretch 4 x 15 sec  Exercise 9: sidelying right hip obers stretch x 0- not today  Exercise 10: supine distal right le distraction x 0  Exercise 11: supine lateral hip distraction x 0  Exercise 12: sit to stand with ta contraction x 0  Exercise 13: t-ball ta contraction alone, with alt ue, with alt le, with alt ue/le x0   *add rodeo and walk outs as able  Exercise 14: supine in slight trendelenburg pfm downtraining on biofeedback x 3 min  Exercise 15: supine in slight trendelenburg pfm contraction on biofeedback alone 10 sec x 10, with hip abd with t-band 10 sec x 5, with hip add with towel 10 sec x 5 (x 2), left sidelying Therapy - Joint Manipulation, Functional Mobility Training, Pain Management, Positioning, Modalities, Patient/Caregiver Education & Training  Timed Code Treatment Minutes: 65 Minutes     Therapy Time   Individual Concurrent Group Co-treatment   Time In 6187         Time Out 1117         Minutes 65         Timed Code Treatment Minutes: 65 Minutes  Electronically signed by Caryl Saldana, PT on 9/23/2021 at 11:46 AM

## 2021-09-27 ENCOUNTER — PATIENT MESSAGE (OUTPATIENT)
Dept: INTERNAL MEDICINE | Age: 56
End: 2021-09-27

## 2021-09-27 ENCOUNTER — HOSPITAL ENCOUNTER (OUTPATIENT)
Dept: PHYSICAL THERAPY | Age: 56
Setting detail: THERAPIES SERIES
Discharge: HOME | End: 2021-09-27
Payer: COMMERCIAL

## 2021-09-27 DIAGNOSIS — G89.29 CHRONIC PAIN OF RIGHT HIP: Primary | ICD-10-CM

## 2021-09-27 DIAGNOSIS — M25.551 CHRONIC PAIN OF RIGHT HIP: Primary | ICD-10-CM

## 2021-09-27 DIAGNOSIS — M25.561 RIGHT KNEE PAIN, UNSPECIFIED CHRONICITY: ICD-10-CM

## 2021-09-27 PROCEDURE — 90912 BFB TRAINING 1ST 15 MIN: CPT

## 2021-09-27 PROCEDURE — 97110 THERAPEUTIC EXERCISES: CPT

## 2021-09-27 PROCEDURE — 90913 BFB TRAINING EA ADDL 15 MIN: CPT

## 2021-09-27 NOTE — PROGRESS NOTES
Physical Therapy  Daily Treatment Note  Date: 2021  Patient Name: Deep Tejada  MRN: [id number]     :   1965    Subjective:   General  Chart Reviewed: Yes  Response To Previous Treatment: Not applicable  Family / Caregiver Present: No  Referring Practitioner: Fred Helm  PT Visit Information  Onset Date: 21  PT Insurance Information: Medical [place]  Total # of Visits Approved: 24  Total # of Visits to Date: 5  Plan of Care/Certification Expiration Date: 21  Progress Note Due Date: 21  Subjective  Subjective: Patient states she had more back and hip pain last night and didn't sleep well.   Pain Screening  Patient Currently in Pain: No  Vital Signs  Patient Currently in Pain: No       Treatment Activities:                                    Exercises  Exercise 1: supine legs up wall x 3 min  Exercise 2: supine ta contraction alone 5 sec x 5, alt ue 1 x 5, alt le 1 x 5, alt ue/le 1 x 5- not today  Exercise 3: supine knee push isometric into hip ext on left 5 sec x5, into hip flex on right 5 sec x 5  Exercise 4: supine pelvic tilts 1 x 10- not today  Exercise 5: supine bridge  1 x 10- not today  Exercise 6: hs 90-90 neutral with er and ir 4 x 15 sec ea bilaterally  Exercise 7: supine right hip psoas, quad, IT band stm x 6 min, abdominal connective tissue mobilization with skin rolling and cross arm tech x 10 min  Exercise 8: supine right hip modified maribel stretch 4 x 15 sec- not today  Exercise 9: sidelying right hip obers stretch x 0- not today  Exercise 10: supine distal right le distraction x 0  Exercise 11: supine lateral hip distraction x 0  Exercise 12: sit to stand with ta contraction x 0  Exercise 13: t-ball ta contraction alone, with alt ue, with alt le, with alt ue/le x0   *add rodeo and walk outs as able  Exercise 14: supine in sidelying on left pfm downtraining on biofeedback x 3 min  Exercise 15: supine in slight trendelenburg pfm contraction on biofeedback alone 10 sec x 0, with hip abd with t-band 10 sec x 0, with hip add with towel 10 sec x 0, left sidelying 10 sec x 7, with towel squeeze 10 sec x 10, supine hooklying alone 10 sec x 3, supine legs extended 10 sec x 5  Exercise 18: HEP issued 09/13/2021                               Assessment:   Conditions Requiring Skilled Therapeutic Intervention  Body structures, Functions, Activity limitations: Decreased functional mobility ; Decreased ADL status; Decreased strength;Decreased endurance;Decreased coordination  Assessment: Patient did well with tx today with initial difficulty with relaxation but with downtrainging with breathing tech and sidelying positional change she was able to relax at level 4-10, with supine position she was relaxed at level 4-15 with increased relaxation with legs extended vs hooklying. She did have decreased mobility noted throughout left abdominal region today and improved with connective tissue mobiliization tech's. She did well with pfm contraction today on biofeedback with level 15-30 thorughout session.   Treatment Diagnosis: muscle weakness, stress incontinence  REQUIRES PT FOLLOW UP: Yes  Treatment Initiated : ther-ex    Goals:  Short term goals  Time Frame for Short term goals: 6 Weeks  Short term goal 1: Patient to have PERF score 2+/10//15/20  Short term goal 2: Patient to have good lumbopelvic stabilization in hooklying alone  Short term goal 3: Patient to have report of decreased saturation of pad when playing pickle ball  Short term goal 4: Patient to be independent with HEP  Long term goals  Time Frame for Long term goals : 12 Weeks  Long term goal 1: Patient to have decreased urinary incontinence by 50%  Long term goal 2: Patient to report decreased urgency and frequency of urine by 50%  Long term goal 3: Patient to have good lumbopelvic stabilizaiton in hooklying with ue/le activity  Long term goal 4: Patient to demo increase function demo'ed by scoring <= 15% impairment on PFIQ-7 urine section  Patient Goals   Patient goals : decrease urinary incontinence  Plan:    Plan  Times per week: 2x/week  Plan weeks: 12 weeks  Current Treatment Recommendations: Strengthening, ROM, Home Exercise Program, Safety Education & Training, Manual Therapy - Soft Tissue Mobilization, Neuromuscular Re-education, Manual Therapy - Joint Manipulation, Functional Mobility Training, Pain Management, Positioning, Modalities, Patient/Caregiver Education & Training  Timed Code Treatment Minutes: 64 Minutes     Therapy Time   Individual Concurrent Group Co-treatment   Time In 1005         Time Out 1106         Minutes 61         Timed Code Treatment Minutes: 61 Minutes  Electronically signed by Evangelina Gallagher PT on 9/27/2021 at 11:08 AM

## 2021-09-28 NOTE — TELEPHONE ENCOUNTER
From: Willard Forde  To: Sindy Coughlin MD  Sent: 9/27/2021 8:55 AM CDT  Subject: Non-Urgent Medical Question    I continue to experience hip pain that radiates down the front of my leg and wraps around my knee. My knee is now swollen and hurts continuously. I barely slept last night because of this pain. On occasion, it is also radiating from my back but not as consistently as my hip/knee. I started physical therapy for my bladder leakage and she has been working some on my hip. Two thoughts, we could add specific therapy requests for my back/hip/knee and the same PT could work on everything. If I am to get an MRI, I would just want to do that before the end of the year for my deductible purposes. And if so, do we look at back, hip, and knee? Would it be better to just refer me to an orthopedic specialist? If so, I prefer Dr. Jennifer Parsons.

## 2021-09-28 NOTE — TELEPHONE ENCOUNTER
Try to get an MRI of her hip and x-ray of her knee. However, she may require 6 weeks of physical therapy. Since she has been doing physical therapy for the bladder, this might potentially get the MRI covered. We could also go ahead and put in referral to Dr. Daina Leonard while were waiting to get everything back.

## 2021-09-29 ENCOUNTER — HOSPITAL ENCOUNTER (OUTPATIENT)
Dept: PHYSICAL THERAPY | Age: 56
Setting detail: THERAPIES SERIES
Discharge: HOME | End: 2021-09-29
Payer: COMMERCIAL

## 2021-09-29 PROCEDURE — 90912 BFB TRAINING 1ST 15 MIN: CPT

## 2021-09-29 PROCEDURE — 90913 BFB TRAINING EA ADDL 15 MIN: CPT

## 2021-09-29 PROCEDURE — 97110 THERAPEUTIC EXERCISES: CPT

## 2021-09-29 NOTE — PROGRESS NOTES
Physical Therapy  Daily Treatment Note  Date: 2021  Patient Name: Willard Forde  MRN: [id number]     :   1965    Subjective:   General  Chart Reviewed: Yes  Response To Previous Treatment: Not applicable  Family / Caregiver Present: No  Referring Practitioner: Nathaly Boo  PT Visit Information  Onset Date: 21  PT Insurance Information: Medical [place]  Total # of Visits Approved: 24  Total # of Visits to Date: 6  Plan of Care/Certification Expiration Date: 21  Progress Note Due Date: 21  Subjective  Subjective: Patient states she didn't sleep well again and has back pain today. She says she has been focusing on her breathing including when she is trying to hold her urine.   Pain Screening  Patient Currently in Pain: No  Vital Signs  Patient Currently in Pain: No       Treatment Activities:                                    Exercises  Exercise 1: supine legs up wall x 3 min- not today  Exercise 2: supine ta contraction alone 5 sec x 5, alt ue 1 x 5, alt le 1 x 5, alt ue/le 1 x 5- not today  Exercise 3: supine knee push isometric into hip ext on left 5 sec x5, into hip flex on right 5 sec x 5 (x 2)  Exercise 4: supine pelvic tilts 1 x 10- not today  Exercise 5: supine bridge  1 x 10- not today  Exercise 6: hs 90-90 neutral with er and ir 4 x 15 sec ea bilaterally  Exercise 7: supine right hip psoas, quad, IT band stm x 6 min, abdominal connective tissue mobilization with skin rolling and cross arm tech x 10 min-  not today  Exercise 8: supine right hip modified maribel stretch 4 x 15 sec- not today  Exercise 9: sidelying right hip obers stretch x 0- not today  Exercise 10: supine distal right le distraction x 0  Exercise 11: supine lateral hip distraction x 0  Exercise 12: sit to stand with ta contraction x 0  Exercise 13: t-ball ta contraction alone, with alt ue, with alt le, with alt ue/le x0   *add rodeo and walk outs as able  Exercise 14: supine in sidelying on left pfm function demo'ed by scoring <= 15% impairment on PFIQ-7 urine section  Patient Goals   Patient goals : decrease urinary incontinence  Plan:    Plan  Times per week: 2x/week  Plan weeks: 12 weeks  Current Treatment Recommendations: Strengthening, ROM, Home Exercise Program, Safety Education & Training, Manual Therapy - Soft Tissue Mobilization, Neuromuscular Re-education, Manual Therapy - Joint Manipulation, Functional Mobility Training, Pain Management, Positioning, Modalities, Patient/Caregiver Education & Training  Timed Code Treatment Minutes: 46 Minutes     Therapy Time   Individual Concurrent Group Co-treatment   Time In           Time Out 3796         Minutes 46         Timed Code Treatment Minutes: 46 Minutes  Electronically signed by Debora Wheeler PT on 9/29/2021 at 8:04 AM

## 2021-09-30 ENCOUNTER — APPOINTMENT (OUTPATIENT)
Dept: PHYSICAL THERAPY | Age: 56
End: 2021-09-30
Payer: COMMERCIAL

## 2021-10-13 ENCOUNTER — APPOINTMENT (OUTPATIENT)
Dept: PHYSICAL THERAPY | Age: 56
End: 2021-10-13
Payer: COMMERCIAL

## 2021-10-14 ENCOUNTER — HOSPITAL ENCOUNTER (OUTPATIENT)
Dept: PHYSICAL THERAPY | Age: 56
Setting detail: THERAPIES SERIES
Discharge: HOME | End: 2021-10-14
Payer: COMMERCIAL

## 2021-10-14 PROCEDURE — 97110 THERAPEUTIC EXERCISES: CPT

## 2021-10-14 NOTE — PROGRESS NOTES
Physical Therapy  Daily Treatment Note/ REASSESSMENT  Date: 10/14/2021  Patient Name: Regine Delong  MRN: [id number]     :   1965    Subjective:   General  Chart Reviewed: Yes  Response To Previous Treatment: Not applicable  Family / Caregiver Present: No  Referring Practitioner: Ben Maxwell  PT Visit Information  Onset Date: 21  PT Insurance Information: Medical [place]  Total # of Visits Approved: 24  Total # of Visits to Date: 7  Plan of Care/Certification Expiration Date: 21  Progress Note Due Date: 21  Subjective  Subjective: Patient states she still can't feel if she is doing the ex's correctly but she does think she is doing better at holding her urine and not going as much. Pain Screening  Patient Currently in Pain: No  Vital Signs  Patient Currently in Pain: No       Treatment Activities:                                    Exercises  Exercise 2: supine ta contraction alone 5 sec x 5, alt ue 1 x 5, alt le 1 x 5, alt ue/le 1 x 5- not today  Exercise 6: hs 90-90 neutral with er and ir 4 x 15 sec ea bilaterally  Exercise 15: PERF score 2/10/11/20                               Assessment:   Conditions Requiring Skilled Therapeutic Intervention  Body structures, Functions, Activity limitations: Decreased functional mobility ; Decreased ADL status; Decreased strength;Decreased endurance;Decreased coordination  Assessment: Patient has shown increased strength, activity tolerance and function with pfm's today during testing. She also has improved lumbopelvic stabilization noted and decreased urinary incontinence, frequency and urge reported. Overall patient is improving but will still benefit from skilled PT for increased pfm strength and activity tolerance.   Treatment Diagnosis: muscle weakness, stress incontinence  REQUIRES PT FOLLOW UP: Yes  Treatment Initiated : ther-ex    Goals:  Short term goals  Time Frame for Short term goals: 6 Weeks  Short term goal 1: Patient to have PERF score 2+/10//15/20- PROGRESS (10/14/2021: PERF score 2/10/11/20)  Short term goal 2: Patient to have good lumbopelvic stabilization in hooklying alone- MET (10/14/2021: Patient has good lumboplevic stabilization in hooklying alone)  Short term goal 3: Patient to have report of decreased saturation of pad when playing pickle ball- PROGRESS (10/14/2021: Patient states yesterday she had less saturation of her pad  but says many factors seem to go into how she does especially time of day and what fluids she has had)  Short term goal 4: Patient to be independent with HEP- MET (10/14/2021: Patient states is independent with HEP but still has trouble knowing if she is doing them correctly each time)  Long term goals  Time Frame for Long term goals : 12 Weeks  Long term goal 1: Patient to have decreased urinary incontinence by 50%- PROGRESS (10/14/2021: Patient reports decreased urinary incontinence 25%)  Long term goal 2: Patient to report decreased urgency and frequency of urine by 50%- PROGRESS (10/14/2021: Patient repots decreased urgency and frequency by 40%)  Long term goal 3: Patient to have good lumbopelvic stabilizaiton in hooklying with ue/le activity- MET (10/14/2021: patient has good lumbopelvic stabilization in hooklying with alt ue/le activity)  Long term goal 4: Patient to demo increase function demo'ed by scoring <= 15% impairment on PFIQ-7 urine section- NOT MET (10/14/2021: Patient scored 26.98% impairmenton PFIQ-7)  Patient Goals   Patient goals : decrease urinary incontinence  Plan:    Plan  Times per week: 2x/week  Plan weeks: 12 weeks  Current Treatment Recommendations: Strengthening, ROM, Home Exercise Program, Safety Education & Training, Manual Therapy - Soft Tissue Mobilization, Neuromuscular Re-education, Manual Therapy - Joint Manipulation, Functional Mobility Training, Pain Management, Positioning, Modalities, Patient/Caregiver Education & Training  Timed Code Treatment Minutes: 47 Minutes Therapy Time   Individual Concurrent Group Co-treatment   Time In 1003         Time Out 1050         Minutes 47         Timed Code Treatment Minutes: 52 Minutes  Electronically signed by Ayde Aburto PT on 10/14/2021 at 11:00 AM

## 2021-10-18 ENCOUNTER — APPOINTMENT (OUTPATIENT)
Dept: PHYSICAL THERAPY | Age: 56
End: 2021-10-18
Payer: COMMERCIAL

## 2021-10-18 ENCOUNTER — HOSPITAL ENCOUNTER (OUTPATIENT)
Dept: MRI IMAGING | Age: 56
Discharge: HOME | End: 2021-10-18
Payer: COMMERCIAL

## 2021-10-18 DIAGNOSIS — M25.551 CHRONIC PAIN OF RIGHT HIP: ICD-10-CM

## 2021-10-18 DIAGNOSIS — G89.29 CHRONIC PAIN OF RIGHT HIP: ICD-10-CM

## 2021-10-18 PROCEDURE — 73721 MRI JNT OF LWR EXTRE W/O DYE: CPT

## 2021-10-20 ENCOUNTER — TELEPHONE (OUTPATIENT)
Dept: INTERNAL MEDICINE | Age: 56
End: 2021-10-20

## 2021-10-20 ENCOUNTER — HOSPITAL ENCOUNTER (OUTPATIENT)
Dept: PHYSICAL THERAPY | Age: 56
Setting detail: THERAPIES SERIES
Discharge: HOME | End: 2021-10-20
Payer: COMMERCIAL

## 2021-10-20 PROCEDURE — 90912 BFB TRAINING 1ST 15 MIN: CPT

## 2021-10-20 PROCEDURE — 97140 MANUAL THERAPY 1/> REGIONS: CPT

## 2021-10-20 NOTE — PROGRESS NOTES
Physical Therapy  Daily Treatment Note  Date: 10/20/2021  Patient Name: Sony Enriquez  MRN: [id number]     :   1965    Subjective:   General  Chart Reviewed: Yes  Response To Previous Treatment: Not applicable  Family / Caregiver Present: No  Referring Practitioner: Junior Hidalgo  PT Visit Information  Onset Date: 21  PT Insurance Information: Medical [place]  Total # of Visits Approved: 24  Total # of Visits to Date: 8  Plan of Care/Certification Expiration Date: 21  Progress Note Due Date: 21  Subjective  Subjective: Patient states she had her MRI and found out that she has a tear in her gluteal medius. She says she see's Dr. Kings Staples tomorrow. Pain Screening  Patient Currently in Pain: No  Vital Signs  Patient Currently in Pain: No       Treatment Activities:                                    Exercises  Exercise 3: supine knee push isometric into hip ext on left 5 sec x5, into hip flex on right 5 sec x 5 (x 2)  Exercise 6: hs 90-90 neutral with er and ir 4 x 15 sec ea bilaterally  Exercise 7: supine right hip psoas, quad, IT band stm x 6 min, abdominal connective tissue mobilization with skin rolling and cross arm tech x 10 min  Exercise 14: supine in sidelying on left pfm downtraining on biofeedback x 3 min, supine biofeedback pfm contraction alone in sidelying 10 sec x 5, with pillow for hip add 10 sec x 15, with t-band for hip abd 10 sec x 10  Exercise 15: PERF score 2/10/11/20                               Assessment:   Conditions Requiring Skilled Therapeutic Intervention  Body structures, Functions, Activity limitations: Decreased functional mobility ; Decreased ADL status; Decreased strength;Decreased endurance;Decreased coordination  Assessment: Patient did fair with tx today, but she had increased muscle tension noted on biofeedback today on pfm's. She reported that she overall could tell that her body was more tense.   She had increased right hip tension noted as well especially throughout psoas and quad. She tolerated pfm contractions well on biofeedback but she had a high resting level of 30-40 thorughout session and contraction at elvel 40-60 thorughout. She did have decreased difficulty with contraction with accessory muscle use.   Treatment Diagnosis: muscle weakness, stress incontinence  REQUIRES PT FOLLOW UP: Yes  Treatment Initiated : ther-ex    Goals:  Short term goals  Time Frame for Short term goals: 6 Weeks  Short term goal 1: Patient to have PERF score 2+/10//15/20- PROGRESS (10/14/2021: PERF score 2/10/11/20)  Short term goal 2: Patient to have good lumbopelvic stabilization in hooklying alone- MET (10/14/2021: Patient has good lumboplevic stabilization in hooklying alone)  Short term goal 3: Patient to have report of decreased saturation of pad when playing pickle ball- PROGRESS (10/14/2021: Patient states yesterday she had less saturation of her pad  but says many factors seem to go into how she does especially time of day and what fluids she has had)  Short term goal 4: Patient to be independent with HEP- MET (10/14/2021: Patient states is independent with HEP but still has trouble knowing if she is doing them correctly each time)  Long term goals  Time Frame for Long term goals : 12 Weeks  Long term goal 1: Patient to have decreased urinary incontinence by 50%- PROGRESS (10/14/2021: Patient reports decreased urinary incontinence 25%)  Long term goal 2: Patient to report decreased urgency and frequency of urine by 50%- PROGRESS (10/14/2021: Patient repots decreased urgency and frequency by 40%)  Long term goal 3: Patient to have good lumbopelvic stabilizaiton in hooklying with ue/le activity- MET (10/14/2021: patient has good lumbopelvic stabilization in hooklying with alt ue/le activity)  Long term goal 4: Patient to demo increase function demo'ed by scoring <= 15% impairment on PFIQ-7 urine section- NOT MET (10/14/2021: Patient scored 26.98% impairmenton PFIQ-7)  Patient Goals   Patient goals : decrease urinary incontinence  Plan:    Plan  Times per week: 2x/week  Plan weeks: 12 weeks  Current Treatment Recommendations: Strengthening, ROM, Home Exercise Program, Safety Education & Training, Manual Therapy - Soft Tissue Mobilization, Neuromuscular Re-education, Manual Therapy - Joint Manipulation, Functional Mobility Training, Pain Management, Positioning, Modalities, Patient/Caregiver Education & Training  Timed Code Treatment Minutes: 45 Minutes     Therapy Time   Individual Concurrent Group Co-treatment   Time In 1000         Time Out 1045         Minutes 45         Timed Code Treatment Minutes: 45 Minutes  Electronically signed by Ayde Aburto PT on 10/20/2021 at 12:35 PM

## 2021-10-20 NOTE — TELEPHONE ENCOUNTER
----- Message from Guerrero Strickland MD sent at 10/18/2021 11:17 AM CDT -----  Has a gluteus medius tear.  Refer to ortho

## 2021-10-26 ENCOUNTER — HOSPITAL ENCOUNTER (OUTPATIENT)
Dept: PHYSICAL THERAPY | Age: 56
Setting detail: THERAPIES SERIES
End: 2021-10-26
Payer: COMMERCIAL

## 2021-10-27 ENCOUNTER — HOSPITAL ENCOUNTER (OUTPATIENT)
Dept: PHYSICAL THERAPY | Age: 56
Setting detail: THERAPIES SERIES
Discharge: HOME | End: 2021-10-27
Payer: COMMERCIAL

## 2021-10-27 PROCEDURE — 97140 MANUAL THERAPY 1/> REGIONS: CPT

## 2021-10-27 PROCEDURE — 90912 BFB TRAINING 1ST 15 MIN: CPT

## 2021-10-27 PROCEDURE — 90913 BFB TRAINING EA ADDL 15 MIN: CPT

## 2021-10-27 NOTE — PROGRESS NOTES
impairmenton PFIQ-7)  Patient Goals   Patient goals : decrease urinary incontinence  Plan:    Plan  Times per week: 2x/week  Plan weeks: 12 weeks  Current Treatment Recommendations: Strengthening, ROM, Home Exercise Program, Safety Education & Training, Manual Therapy - Soft Tissue Mobilization, Neuromuscular Re-education, Manual Therapy - Joint Manipulation, Functional Mobility Training, Pain Management, Positioning, Modalities, Patient/Caregiver Education & Training  Timed Code Treatment Minutes: 49 Minutes     Therapy Time   Individual Concurrent Group Co-treatment   Time In 0911         Time Out 1000         Minutes 49         Timed Code Treatment Minutes: 49 Minutes  Electronically signed by Jay Dougherty PT on 10/27/2021 at 12:22 PM

## 2021-11-02 ENCOUNTER — APPOINTMENT (OUTPATIENT)
Dept: PHYSICAL THERAPY | Age: 56
End: 2021-11-02
Payer: COMMERCIAL

## 2021-11-04 ENCOUNTER — HOSPITAL ENCOUNTER (OUTPATIENT)
Dept: PHYSICAL THERAPY | Age: 56
Setting detail: THERAPIES SERIES
End: 2021-11-04
Payer: COMMERCIAL

## 2021-11-08 ENCOUNTER — APPOINTMENT (OUTPATIENT)
Dept: PHYSICAL THERAPY | Age: 56
End: 2021-11-08
Payer: COMMERCIAL

## 2021-11-10 ENCOUNTER — APPOINTMENT (OUTPATIENT)
Dept: PHYSICAL THERAPY | Age: 56
End: 2021-11-10
Payer: COMMERCIAL

## 2021-11-11 ENCOUNTER — APPOINTMENT (OUTPATIENT)
Dept: PHYSICAL THERAPY | Age: 56
End: 2021-11-11
Payer: COMMERCIAL

## 2021-11-17 ENCOUNTER — APPOINTMENT (OUTPATIENT)
Dept: PHYSICAL THERAPY | Age: 56
End: 2021-11-17
Payer: COMMERCIAL

## 2021-11-22 ENCOUNTER — HOSPITAL ENCOUNTER (OUTPATIENT)
Dept: PHYSICAL THERAPY | Age: 56
Setting detail: THERAPIES SERIES
Discharge: HOME | End: 2021-11-22
Payer: COMMERCIAL

## 2021-11-22 PROCEDURE — 97140 MANUAL THERAPY 1/> REGIONS: CPT

## 2021-11-22 PROCEDURE — 97110 THERAPEUTIC EXERCISES: CPT

## 2021-11-22 NOTE — PROGRESS NOTES
Physical Therapy  Daily Treatment Note/ REASSESSMENT  Date: 2021  Patient Name: Rachid Flores  MRN: [id number]     :   1965    Subjective:   General  Chart Reviewed: Yes  Response To Previous Treatment: Not applicable  Family / Caregiver Present: No  Referring Practitioner: Eliu Jeter  PT Visit Information  Onset Date: 21  PT Insurance Information: [employer]  Total # of Visits Approved: 24  Total # of Visits to Date: 10  Plan of Care/Certification Expiration Date: 21  Progress Note Due Date: 21  Subjective  Subjective: Patient states she is having about the same amount of leakage with playing pickle ball. She says that she feels the urgency is better at times, but worse at times when she was traveling. Patient states she is still having some abdominal cramps and back pain that limited her pelvic ex's some, but she has done some of them. Patient states her hip and back have been doing better since her steroids, but she isn't sure if it is just because she has been doing less work. She says that she overall feels her urge and leakae are a bit worse since the steroids and not being able to do her ex's and participate in therapy.   Pain Screening  Patient Currently in Pain: No  Vital Signs  Patient Currently in Pain: No       Treatment Activities:                                    Exercises  Exercise 1: supine legs up wall x 3 min- not today  Exercise 2: supine ta contraction alone 10 sec x 10, alt ue 1 x 10, alt le 1 x 10, alt ue/le 1 x 10  Exercise 6: hs 90-90 neutral with er and ir 4 x 15 sec ea bilaterally  Exercise 7: supine right hip psoas, quad, IT band stm x 10 min, with roller x 3 min, abdominal connective tissue mobilization with skin rolling and cross arm tech x 0 min  Exercise 8: supine right hip modified maribel stretch 4 x 15 sec  Exercise 9: sidelying right hip obers stretch 4 x 15 sec  Exercise 12: sidelying right hip obers stretch 4 x 15 sec ea  Exercise 14: sidelying on left pfm downtraining on biofeedback x 3 min, supine biofeedback pfm contraction alone in sidelying 10 sec x 10, with pillow for hip add 10 sec x 10, with t-band for hip abd 10 sec x 5- not today  Exercise 15: PERF score 2/10/15/20                               Assessment:   Conditions Requiring Skilled Therapeutic Intervention  Body structures, Functions, Activity limitations: Decreased functional mobility ; Decreased ADL status; Decreased strength; Decreased endurance; Decreased coordination  Assessment: Patient did well with tx today with decreased tension noted throughout right psoas, quad, IT band, but still present. She has improved ability to perform pfm contractions with more symmetrical contraction, but needs max v.c. to achieve slight lift. She is able to contract with decreased accessory muscle use during testing, but with quick flicks she continues to have compensation noted. Overall she has improved with strength, but slight decreased in urgency and incontinence reports. She has not been able to attend for about 3 weeks, which has likely contributed to min to no change this reassessment. Patient will continue to benefit from skilled PT to increase strength, decreased urgency and incontinence. Patient encouraged to decrease caffeine intake to help with urgency.   Treatment Diagnosis: muscle weakness, stress incontinence  REQUIRES PT FOLLOW UP: Yes  Treatment Initiated : ther-ex    Goals:  Short term goals  Time Frame for Short term goals: 6 Weeks  Short term goal 1: Patient to have PERF score 2+/10//15/20- PROGRESS (11/22/2021: PERF score 2/10/15/20 *able to perform 2+ with max v.c.)  Short term goal 2: Patient to have good lumbopelvic stabilization in hooklying alone- MET (11/22/2021: Patient has good lumboplevic stabilization in hooklying alone)  Short term goal 3: Patient to have report of decreased saturation of pad when playing pickle ball- NOT MET (11/22/2021: Patient states she still has a lot of saturation when she plays pickle ball)  Short term goal 4: Patient to be independent with HEP- MET (11/22/2021: Patient states is independent with HEP but she likes to do them standing)  Long term goals  Time Frame for Long term goals : 12 Weeks  Long term goal 1: Patient to have decreased urinary incontinence by 50%- PROGRESS (11/22/2021: Patient reports decreased urinary incontinence 15%, with slight decline since last visit)  Long term goal 2: Patient to report decreased urgency and frequency of urine by 50%- PROGRESS (11/22/2021: Patient repots decreased urgency and frequency by 20%)  Long term goal 3: Patient to have good lumbopelvic stabilizaiton in hooklying with ue/le activity- MET (11/22/2021: patient has good lumbopelvic stabilization in hooklying with alt ue/le activity)  Long term goal 4: Patient to demo increase function demo'ed by scoring <= 15% impairment on PFIQ-7 urine section- NOT MET (11/22/2021: Patient scored 47.61% impairmenton PFIQ-7 urine section)  Patient Goals   Patient goals : decrease urinary incontinence  Plan:    Plan  Times per week: 2x/week  Plan weeks: 12 weeks  Current Treatment Recommendations: Strengthening, ROM, Home Exercise Program, Safety Education & Training, Manual Therapy - Soft Tissue Mobilization, Neuromuscular Re-education, Manual Therapy - Joint Manipulation, Functional Mobility Training, Pain Management, Positioning, Modalities, Patient/Caregiver Education & Training  Timed Code Treatment Minutes: 59 Minutes     Therapy Time   Individual Concurrent Group Co-treatment   Time In 0929         Time Out 1033         Minutes 64         Timed Code Treatment Minutes: 64 Minutes  Electronically signed by Kole Torres PT on 11/22/2021 at 10:51 AM

## 2021-11-29 ENCOUNTER — HOSPITAL ENCOUNTER (OUTPATIENT)
Dept: PHYSICAL THERAPY | Age: 56
Setting detail: THERAPIES SERIES
End: 2021-11-29
Payer: COMMERCIAL

## 2021-12-01 ENCOUNTER — APPOINTMENT (OUTPATIENT)
Dept: PHYSICAL THERAPY | Age: 56
End: 2021-12-01
Payer: COMMERCIAL

## 2021-12-01 ENCOUNTER — OFFICE VISIT (OUTPATIENT)
Dept: INTERNAL MEDICINE | Age: 56
End: 2021-12-01
Payer: COMMERCIAL

## 2021-12-01 VITALS
BODY MASS INDEX: 22.47 KG/M2 | HEIGHT: 67 IN | OXYGEN SATURATION: 99 % | SYSTOLIC BLOOD PRESSURE: 122 MMHG | HEART RATE: 77 BPM | WEIGHT: 143.2 LBS | DIASTOLIC BLOOD PRESSURE: 80 MMHG

## 2021-12-01 DIAGNOSIS — R10.12 LEFT UPPER QUADRANT ABDOMINAL PAIN: ICD-10-CM

## 2021-12-01 DIAGNOSIS — E34.9 HORMONE DISORDER: ICD-10-CM

## 2021-12-01 DIAGNOSIS — R10.12 LEFT UPPER QUADRANT ABDOMINAL PAIN: Primary | ICD-10-CM

## 2021-12-01 DIAGNOSIS — Z23 FLU VACCINE NEED: ICD-10-CM

## 2021-12-01 LAB
ALBUMIN SERPL-MCNC: 4.4 G/DL (ref 3.5–5.2)
ALP BLD-CCNC: 76 U/L (ref 35–104)
ALT SERPL-CCNC: 16 U/L (ref 5–33)
AMYLASE: 77 U/L (ref 28–100)
ANION GAP SERPL CALCULATED.3IONS-SCNC: 14 MMOL/L (ref 7–19)
AST SERPL-CCNC: 20 U/L (ref 5–32)
BACTERIA: NEGATIVE /HPF
BASOPHILS ABSOLUTE: 0 K/UL (ref 0–0.2)
BASOPHILS RELATIVE PERCENT: 0.4 % (ref 0–1)
BILIRUB SERPL-MCNC: 0.4 MG/DL (ref 0.2–1.2)
BILIRUBIN URINE: NEGATIVE
BLOOD, URINE: NEGATIVE
BUN BLDV-MCNC: 13 MG/DL (ref 6–20)
CALCIUM SERPL-MCNC: 9.4 MG/DL (ref 8.6–10)
CHLORIDE BLD-SCNC: 105 MMOL/L (ref 98–111)
CLARITY: CLEAR
CO2: 23 MMOL/L (ref 22–29)
COLOR: YELLOW
CREAT SERPL-MCNC: 0.7 MG/DL (ref 0.5–0.9)
CRYSTALS, UA: ABNORMAL /HPF
EOSINOPHILS ABSOLUTE: 0.1 K/UL (ref 0–0.6)
EOSINOPHILS RELATIVE PERCENT: 2.9 % (ref 0–5)
EPITHELIAL CELLS, UA: 3 /HPF (ref 0–5)
GFR AFRICAN AMERICAN: >59
GFR NON-AFRICAN AMERICAN: >60
GLUCOSE BLD-MCNC: 86 MG/DL (ref 74–109)
GLUCOSE URINE: NEGATIVE MG/DL
HCT VFR BLD CALC: 47.8 % (ref 37–47)
HEMOGLOBIN: 15.2 G/DL (ref 12–16)
HYALINE CASTS: 1 /HPF (ref 0–8)
IMMATURE GRANULOCYTES #: 0 K/UL
KETONES, URINE: NEGATIVE MG/DL
LEUKOCYTE ESTERASE, URINE: NEGATIVE
LIPASE: 41 U/L (ref 13–60)
LYMPHOCYTES ABSOLUTE: 1.2 K/UL (ref 1.1–4.5)
LYMPHOCYTES RELATIVE PERCENT: 25.9 % (ref 20–40)
MCH RBC QN AUTO: 28.9 PG (ref 27–31)
MCHC RBC AUTO-ENTMCNC: 31.8 G/DL (ref 33–37)
MCV RBC AUTO: 90.9 FL (ref 81–99)
MONOCYTES ABSOLUTE: 0.4 K/UL (ref 0–0.9)
MONOCYTES RELATIVE PERCENT: 9.2 % (ref 0–10)
NEUTROPHILS ABSOLUTE: 2.8 K/UL (ref 1.5–7.5)
NEUTROPHILS RELATIVE PERCENT: 61.4 % (ref 50–65)
NITRITE, URINE: NEGATIVE
PDW BLD-RTO: 12.8 % (ref 11.5–14.5)
PH UA: 6 (ref 5–8)
PLATELET # BLD: 220 K/UL (ref 130–400)
PMV BLD AUTO: 10.2 FL (ref 9.4–12.3)
POTASSIUM SERPL-SCNC: 4.6 MMOL/L (ref 3.5–5)
PROGESTERONE LEVEL: 0.77 NG/ML
PROTEIN UA: NEGATIVE MG/DL
RBC # BLD: 5.26 M/UL (ref 4.2–5.4)
RBC UA: 0 /HPF (ref 0–4)
SODIUM BLD-SCNC: 142 MMOL/L (ref 136–145)
SPECIFIC GRAVITY UA: 1.01 (ref 1–1.03)
TESTOSTERONE TOTAL: <2.5 NG/DL (ref 2.9–40.8)
TOTAL PROTEIN: 7 G/DL (ref 6.6–8.7)
TSH SERPL DL<=0.05 MIU/L-ACNC: 2.76 UIU/ML (ref 0.27–4.2)
UROBILINOGEN, URINE: 0.2 E.U./DL
WBC # BLD: 4.6 K/UL (ref 4.8–10.8)
WBC UA: 1 /HPF (ref 0–5)

## 2021-12-01 PROCEDURE — 99214 OFFICE O/P EST MOD 30 MIN: CPT | Performed by: INTERNAL MEDICINE

## 2021-12-01 PROCEDURE — 90674 CCIIV4 VAC NO PRSV 0.5 ML IM: CPT | Performed by: INTERNAL MEDICINE

## 2021-12-01 PROCEDURE — 90471 IMMUNIZATION ADMIN: CPT | Performed by: INTERNAL MEDICINE

## 2021-12-01 RX ORDER — SUCRALFATE 1 G/1
1 TABLET ORAL 4 TIMES DAILY
Qty: 120 TABLET | Refills: 3 | Status: SHIPPED | OUTPATIENT
Start: 2021-12-01 | End: 2021-12-14

## 2021-12-01 NOTE — PROGRESS NOTES
After obtaining consent, and per orders of Dr. Ramón Mosley, injection of Flu given in Left deltoid by Brooke Hector MA. Patient instructed to remain in clinic for 20 minutes afterwards, and to report any adverse reaction to me immediately.

## 2021-12-01 NOTE — PROGRESS NOTES
Chief Complaint   Patient presents with    Spasms     Esophageal    Flank Pain     under left breast and into the back. HPI: Rachid Flores is a 55 y.o. female is here for a weight of left-sided abdominal pain, and flank pain. She also thinks that she may be having some esophageal spasms. She also has had a gluteal tear. She has been on steroids. She states that for a while, her symptoms improved. She states that just does not feel well. She is on compounded hormones. She had to stop those secondary to vaginal bleeding. She request that we check her estrogen progesterone testosterone and thyroid function today. She is worried that she could possibly have pancreatitis. The pain does seem to radiate to her back. She has not had any fever or chills. She has not had any vomiting    Past Medical History:   Diagnosis Date    Acid reflux     Anemia     BRCA negative     Chronic back pain     GERD (gastroesophageal reflux disease)     Hypothyroidism     Irritable bowel syndrome     Premature atrial contraction     Skipped heart beats     Thyroid disease     Thyroid mass     Vitamin D deficiency       Past Surgical History:   Procedure Laterality Date    BREAST ENHANCEMENT SURGERY      BREAST SURGERY      Augmentation,Biopsy+    CHOLECYSTECTOMY      COLONOSCOPY  2008???    [facility]    COLONOSCOPY  12/12/2012        COLONOSCOPY N/A 04/22/2021    Dr Aston Ferguson, 5 yr recall    HEMORRHOID SURGERY      THYROID SURGERY      Biopsy    THYROID SURGERY      nodule removed    TONSILLECTOMY      TUBAL LIGATION      UPPER GASTROINTESTINAL ENDOSCOPY  2008? ?     Dr Dmitry Duke  02/12/2015        UPPER GASTROINTESTINAL ENDOSCOPY N/A 08/05/2019    Dr Marbin Moraes has non-erosive GERD with or without Esophageal dysmotility      Social History     Socioeconomic History    Marital status:      Spouse name: None    Number of children: None    Years of education: None    Highest education level: None   Occupational History    None   Tobacco Use    Smoking status: Never Smoker    Smokeless tobacco: Never Used   Vaping Use    Vaping Use: Never used   Substance and Sexual Activity    Alcohol use: Yes     Comment: couple times per week    Drug use: No    Sexual activity: Yes     Partners: Male     Birth control/protection: Surgical   Other Topics Concern    None   Social History Narrative    ** Merged History Encounter **          Social Determinants of Health     Financial Resource Strain: Low Risk     Difficulty of Paying Living Expenses: Not hard at all   Food Insecurity: No Food Insecurity    Worried About Running Out of Food in the Last Year: Never true    Nilesh of Food in the Last Year: Never true   Transportation Needs: No Transportation Needs    Lack of Transportation (Medical): No    Lack of Transportation (Non-Medical):  No   Physical Activity:     Days of Exercise per Week: Not on file    Minutes of Exercise per Session: Not on file   Stress:     Feeling of Stress : Not on file   Social Connections:     Frequency of Communication with Friends and Family: Not on file    Frequency of Social Gatherings with Friends and Family: Not on file    Attends Yazidism Services: Not on file    Active Member of [address] [place] [address] or Organizations: Not on file    Attends Club or Organization Meetings: Not on file    Marital Status: Not on file   Intimate Partner Violence:     Fear of Current or Ex-Partner: Not on file    Emotionally Abused: Not on file    Physically Abused: Not on file    Sexually Abused: Not on file   Housing Stability:     Unable to Pay for Housing in the Last Year: Not on file    Number of [place] in the Last Year: Not on file    Unstable Housing in the Last Year: Not on file      Family History   Problem Relation Age of Onset    Other Mother         brain bleed    Dementia Mother     High Blood Pressure Mother    Anusha Hong Father          54, lung cancer    Cancer Father         Lung-Smoker    Breast Cancer Sister 50         at 47, did not wake up after a nap    Heart Attack Sister     Other Other         brain aneurysm    Colon Cancer Other     Cancer Maternal Grandfather         rectal cancer    Colon Cancer Maternal Grandfather     Colon Polyps Maternal Grandfather     Esophageal Cancer Neg Hx     Liver Cancer Neg Hx     Liver Disease Neg Hx     Rectal Cancer Neg Hx     Stomach Cancer Neg Hx         Current Outpatient Medications   Medication Sig Dispense Refill    sucralfate (CARAFATE) 1 GM tablet Take 1 tablet by mouth 4 times daily 120 tablet 3    albuterol sulfate  (90 Base) MCG/ACT inhaler Inhale 2 puffs into the lungs every 4 hours as needed      Triamcinolone Acetonide (NASACORT ALLERGY 24HR NA) by Nasal route daily as needed      levothyroxine (SYNTHROID) 75 MCG tablet TAKE 1 TABLET BY MOUTH ONE TIME A DAY 90 tablet 3    tiZANidine (ZANAFLEX) 4 MG tablet Take 1 tablet by mouth every 6 hours as needed (muscle spasms) 60 tablet 0    vitamin D (ERGOCALCIFEROL) 1.25 MG (65654 UT) CAPS capsule TAKE 1 CAPSULE BY MOUTH ONCE A WEEK 12 capsule 3    lansoprazole (PREVACID) 30 MG delayed release capsule TAKE ONE CAPSULE TWO TIMES A  capsule 3    loratadine (CLARITIN) 10 MG capsule Take 10 mg by mouth daily      Alum Hydroxide-Mag Carbonate (GAVISCON PO) Take by mouth nightly       montelukast (SINGULAIR) 10 MG tablet Take 1 tablet by mouth nightly (Patient not taking: Reported on 2021) 30 tablet 5    conjugated estrogens (PREMARIN) 0.625 MG/GM vaginal cream Place 0.5 g vaginally daily (Patient not taking: Reported on 2021) 1 Tube 3     No current facility-administered medications for this visit.         Patient Active Problem List   Diagnosis    LLQ abdominal pain    Chronic constipation    Epigastric pressure    Heartburn    Indigestion    Acid reflux    Esophageal spasm    Hemorrhoids    Chest pain    Breast tenderness in female    Breast mass, left    History of breast augmentation    Iron deficiency anemia secondary to inadequate dietary iron intake        Review of Systems   Constitutional: Negative for activity change, appetite change, chills, diaphoresis, fatigue, fever and unexpected weight change. HENT: Negative for congestion, ear pain, facial swelling, hearing loss, mouth sores, nosebleeds, postnasal drip, rhinorrhea, sinus pressure, sneezing, sore throat, tinnitus, trouble swallowing and voice change. Eyes: Negative for photophobia, pain, discharge, redness, itching and visual disturbance. Respiratory: Negative for cough, choking, chest tightness, shortness of breath and wheezing. Cardiovascular: Negative for chest pain, palpitations and leg swelling. Gastrointestinal: Positive for abdominal pain. Negative for abdominal distention, anal bleeding, blood in stool, constipation, diarrhea, nausea, rectal pain and vomiting. Endocrine: Negative for cold intolerance, heat intolerance, polydipsia, polyphagia and polyuria. Genitourinary: Negative for decreased urine volume, difficulty urinating, dysuria, flank pain, frequency, hematuria and urgency. Musculoskeletal: Positive for arthralgias. Negative for back pain, gait problem, joint swelling, myalgias, neck pain and neck stiffness. Skin: Negative for color change, pallor and rash. Allergic/Immunologic: Negative for environmental allergies and food allergies. Neurological: Negative for dizziness, tremors, syncope, weakness, light-headedness, numbness and headaches. Hematological: Negative for adenopathy. Does not bruise/bleed easily. Psychiatric/Behavioral: Negative for agitation, behavioral problems, confusion, decreased concentration, dysphoric mood, hallucinations, self-injury, sleep disturbance and suicidal ideas.  The patient is not nervous/anxious and is not edema. Comments: There is some tenderness on palpation of the right hip. Lymphadenopathy:      Cervical: No cervical adenopathy. Skin:     General: Skin is warm and dry. Capillary Refill: Capillary refill takes less than 2 seconds. Neurological:      General: No focal deficit present. Mental Status: She is alert and oriented to person, place, and time. Mental status is at baseline. Psychiatric:         Mood and Affect: Mood normal.         Behavior: Behavior normal.         Thought Content: Thought content normal.         Judgment: Judgment normal.         1. Left upper quadrant abdominal pain    2. Flu vaccine need    3. Hormone disorder        ASSESSMENT/PLAN:    79-year-old woman here for evaluation of left upper quadrant abdominal pain    1. Left upper quadrant abdominal pain: Uncertain etiology. We will check a CT of the abdomen. CBC CMP amylase lipase urinalysis ordered. Try trial of Carafate. If work-up is negative, will need GI consultation    2. Flu vaccine given today    3. Hormonal disorder: Check estrogen, progesterone testosterone and thyroid panel. Yumiko Moreno was seen today for spasms and flank pain. Diagnoses and all orders for this visit:    Left upper quadrant abdominal pain  -     Comprehensive Metabolic Panel; Future  -     CBC Auto Differential; Future  -     Amylase; Future  -     Lipase; Future  -     Cancel: Urinalysis with Microscopic; Future    Flu vaccine need  -     INFLUENZA, MDCK QUADV, 2 YRS AND OLDER, IM, PF, PREFILL SYR OR SDV, 0.5ML (FLUCELVAX QUADV, PF)    Hormone disorder  -     Testosterone; Future  -     Progesterone; Future  -     TSH without Reflex; Future  -     Estrogens, total; Future    Other orders  -     sucralfate (CARAFATE) 1 GM tablet; Take 1 tablet by mouth 4 times daily  -     CT ABDOMEN W WO CONTRAST; Future          Return in about 2 weeks (around 12/15/2021).      Orders Placed This Encounter   Procedures    [address] [employer] Standing Status:   Future     Standing Expiration Date:   12/1/2022     Order Specific Question:   Additional Contrast?     Answer:   Radiologist Recommendation     Order Specific Question:   Reason for exam:     Answer:   abdominal pain    INFLUENZA, MDCK QUADV, 2 YRS AND OLDER, IM, PF, PREFILL SYR OR SDV, 0.5ML (FLUCELVAX QUADV, PF)    Comprehensive Metabolic Panel     Standing Status:   Future     Number of Occurrences:   1     Standing Expiration Date:   12/1/2022    CBC Auto Differential     Standing Status:   Future     Number of Occurrences:   1     Standing Expiration Date:   12/1/2022    Amylase     Standing Status:   Future     Number of Occurrences:   1     Standing Expiration Date:   12/1/2022    Lipase     Standing Status:   Future     Number of Occurrences:   1     Standing Expiration Date:   12/1/2022    Testosterone     Standing Status:   Future     Number of Occurrences:   1     Standing Expiration Date:   12/1/2022    Progesterone     Standing Status:   Future     Number of Occurrences:   1     Standing Expiration Date:   12/1/2022    TSH without Reflex     Standing Status:   Future     Number of Occurrences:   1     Standing Expiration Date:   12/1/2022    Estrogens, total     Standing Status:   Future     Number of Occurrences:   1     Standing Expiration Date:   12/1/2022       Clearance MD Valeriy

## 2021-12-02 ENCOUNTER — HOSPITAL ENCOUNTER (OUTPATIENT)
Dept: PHYSICAL THERAPY | Age: 56
Setting detail: THERAPIES SERIES
Discharge: HOME | End: 2021-12-02
Payer: COMMERCIAL

## 2021-12-02 PROCEDURE — 97110 THERAPEUTIC EXERCISES: CPT

## 2021-12-02 PROCEDURE — 90913 BFB TRAINING EA ADDL 15 MIN: CPT

## 2021-12-02 PROCEDURE — 97140 MANUAL THERAPY 1/> REGIONS: CPT

## 2021-12-02 PROCEDURE — 90912 BFB TRAINING 1ST 15 MIN: CPT

## 2021-12-02 ASSESSMENT — ENCOUNTER SYMPTOMS
EYE ITCHING: 0
NAUSEA: 0
COUGH: 0
ABDOMINAL DISTENTION: 0
RECTAL PAIN: 0
EYE PAIN: 0
COLOR CHANGE: 0
FACIAL SWELLING: 0
VOICE CHANGE: 0
SINUS PRESSURE: 0
SORE THROAT: 0
CONSTIPATION: 0
ABDOMINAL PAIN: 1
DIARRHEA: 0
ANAL BLEEDING: 0
SHORTNESS OF BREATH: 0
PHOTOPHOBIA: 0
BACK PAIN: 0
VOMITING: 0
CHOKING: 0
CHEST TIGHTNESS: 0
RHINORRHEA: 0
EYE DISCHARGE: 0
EYE REDNESS: 0
BLOOD IN STOOL: 0
TROUBLE SWALLOWING: 0
WHEEZING: 0

## 2021-12-02 NOTE — PROGRESS NOTES
performed 5 sec x 5 ea                               Assessment:   Conditions Requiring Skilled Therapeutic Intervention  Body structures, Functions, Activity limitations: Decreased functional mobility ; Decreased ADL status; Decreased strength; Decreased endurance; Decreased coordination  Assessment: Patient did well with tx today with decreased tension noted on biofeedback with level 4-8 thorughout session and contraction ability 10-15 throughout tx in all positions with occassional reading of 20. She did have fatigue noted at end of session and required mod v.c.to decrease compensation when peforming in standing position. She did have repor of left mid back and abdominal pain that increased slightly with ex's. When positioned in right sidelying putting pillow under trunk helped decreased pain and advised her to position at home like this periodically. she had decreased rib movement noted at rib 5, 7, and 8 with improvement of movement with mwm activity. Recommended heat and biofreeze at home to help decrease mid back tension and provided biofreeze sample.   Treatment Diagnosis: muscle weakness, stress incontinence  REQUIRES PT FOLLOW UP: Yes  Treatment Initiated : ther-ex    Goals:  Short term goals  Time Frame for Short term goals: 6 Weeks  Short term goal 1: Patient to have PERF score 2+/10//15/20- PROGRESS (11/22/2021: PERF score 2/10/15/20 *able to perform 2+ with max v.c.)  Short term goal 2: Patient to have good lumbopelvic stabilization in hooklying alone- MET (11/22/2021: Patient has good lumboplevic stabilization in hooklying alone)  Short term goal 3: Patient to have report of decreased saturation of pad when playing pickle ball- NOT MET (11/22/2021: Patient states she still has a lot of saturation when she plays pickle ball)  Short term goal 4: Patient to be independent with HEP- MET (11/22/2021: Patient states is independent with HEP but she likes to do them standing)  Long term goals  Time Frame for Long term goals : 12 Weeks  Long term goal 1: Patient to have decreased urinary incontinence by 50%- PROGRESS (11/22/2021: Patient reports decreased urinary incontinence 15%, with slight decline since last visit)  Long term goal 2: Patient to report decreased urgency and frequency of urine by 50%- PROGRESS (11/22/2021: Patient repots decreased urgency and frequency by 20%)  Long term goal 3: Patient to have good lumbopelvic stabilizaiton in hooklying with ue/le activity- MET (11/22/2021: patient has good lumbopelvic stabilization in hooklying with alt ue/le activity)  Long term goal 4: Patient to demo increase function demo'ed by scoring <= 15% impairment on PFIQ-7 urine section- NOT MET (11/22/2021: Patient scored 47.61% impairmenton PFIQ-7 urine section)  Patient Goals   Patient goals : decrease urinary incontinence  Plan:    Plan  Times per week: 2x/week  Plan weeks: 12 weeks  Current Treatment Recommendations: Strengthening, ROM, Home Exercise Program, Safety Education & Training, Manual Therapy - Soft Tissue Mobilization, Neuromuscular Re-education, Manual Therapy - Joint Manipulation, Functional Mobility Training, Pain Management, Positioning, Modalities, Patient/Caregiver Education & Training  Timed Code Treatment Minutes: 47 Minutes     Therapy Time   Individual Concurrent Group Co-treatment   Time In 1006         Time Out 1100         Minutes 54         Timed Code Treatment Minutes: 54 Minutes  Electronically signed by Fern Lindsey PT on 12/2/2021 at 2:24 PM

## 2021-12-03 LAB — ESTROGEN TOTAL: 87 PG/ML

## 2021-12-06 ENCOUNTER — HOSPITAL ENCOUNTER (OUTPATIENT)
Dept: PHYSICAL THERAPY | Age: 56
Setting detail: THERAPIES SERIES
Discharge: HOME | End: 2021-12-06
Payer: COMMERCIAL

## 2021-12-06 PROCEDURE — 97110 THERAPEUTIC EXERCISES: CPT

## 2021-12-06 PROCEDURE — 97140 MANUAL THERAPY 1/> REGIONS: CPT

## 2021-12-06 PROCEDURE — 90912 BFB TRAINING 1ST 15 MIN: CPT

## 2021-12-06 PROCEDURE — 90913 BFB TRAINING EA ADDL 15 MIN: CPT

## 2021-12-06 NOTE — PROGRESS NOTES
Physical Therapy  Daily Treatment Note  Date: 2021  Patient Name: Monica Kaplan  MRN: [id number]     :   1965    Subjective:   General  Chart Reviewed: Yes  Response To Previous Treatment: Not applicable  Family / Caregiver Present: No  Referring Practitioner: Sonya Andrew  PT Visit Information  Onset Date: 21  PT Insurance Information: Medical [employer]  Total # of Visits Approved: 24  Total # of Visits to Date: 12  Plan of Care/Certification Expiration Date: 21  Progress Note Due Date: 21  Subjective  Subjective: Patient states she felt good after last session and feels that her rib mobilizations really helped with her pain.   Pain Screening  Patient Currently in Pain: No  Vital Signs  Patient Currently in Pain: No       Treatment Activities:                                    Exercises  Exercise 1: supine legs up wall x 3 min  Exercise 2: supine ta contraction alone 10 sec x 10, alt ue 1 x 10, alt le 1 x 10, alt ue/le 1 x 10  Exercise 6: hs 90-90 neutral with er and ir 4 x 15 sec ea bilaterally  Exercise 7: supine right hip psoas, quad, IT band stm x 8 min, with roller x 0 min, abdominal connective tissue mobilization with skin rolling and cross arm tech x 0 min  Exercise 8: supine right hip modified maribel stretch 4 x 15 sec  Exercise 9: sidelying right hip obers stretch 0 x 15 sec  Exercise 10: bilateral quadratus stretch 4 x 15 sec ea- not today  Exercise 12: sidelying right hip obers stretch 0 x 15 sec ea  Exercise 14: sidelying on left pfm downtraining on biofeedback x 2 min, supine biofeedback pfm contraction alone in sidelying on left alone 10 sec x 7, with towel roll for hip add 10 sec x 7, sidelying on right 10 sec x , supine hooklying alone 10 sec x 0, standing alone 10 sec x 7, seated alone 10 sec x 5 with pillow for hip add 10 sec x 5  Exercise 15: PERF score 2/10/15/20  Exercise 16: left rib 5, 7, 8 mwm with left shoulder extension and abduction isometric performed 5 sec x 5 ea  Exercise 17: scapular retraction 1 x 10                               Assessment:   Conditions Requiring Skilled Therapeutic Intervention  Body structures, Functions, Activity limitations: Decreased functional mobility ; Decreased ADL status; Decreased strength; Decreased endurance; Decreased coordination  Assessment: Patient did well with tx today with relaxation at level 6-15 throughout session. She conitnues to have decreased mobility throughout ribs, but progress noted with mobilizations. She was able to contract at level 15-60 throughout session with fatigue noted toward end of tx.   Treatment Diagnosis: muscle weakness, stress incontinence  REQUIRES PT FOLLOW UP: Yes  Treatment Initiated : ther-ex    Goals:  Short term goals  Time Frame for Short term goals: 6 Weeks  Short term goal 1: Patient to have PERF score 2+/10//15/20- PROGRESS (11/22/2021: PERF score 2/10/15/20 *able to perform 2+ with max v.c.)  Short term goal 2: Patient to have good lumbopelvic stabilization in hooklying alone- MET (11/22/2021: Patient has good lumboplevic stabilization in hooklying alone)  Short term goal 3: Patient to have report of decreased saturation of pad when playing pickle ball- NOT MET (11/22/2021: Patient states she still has a lot of saturation when she plays pickle ball)  Short term goal 4: Patient to be independent with HEP- MET (11/22/2021: Patient states is independent with HEP but she likes to do them standing)  Long term goals  Time Frame for Long term goals : 12 Weeks  Long term goal 1: Patient to have decreased urinary incontinence by 50%- PROGRESS (11/22/2021: Patient reports decreased urinary incontinence 15%, with slight decline since last visit)  Long term goal 2: Patient to report decreased urgency and frequency of urine by 50%- PROGRESS (11/22/2021: Patient repots decreased urgency and frequency by 20%)  Long term goal 3: Patient to have good lumbopelvic stabilizaiton in hooklying with ue/le activity- MET (11/22/2021: patient has good lumbopelvic stabilization in hooklying with alt ue/le activity)  Long term goal 4: Patient to demo increase function demo'ed by scoring <= 15% impairment on PFIQ-7 urine section- NOT MET (11/22/2021: Patient scored 47.61% impairmenton PFIQ-7 urine section)  Patient Goals   Patient goals : decrease urinary incontinence  Plan:    Plan  Times per week: 2x/week  Plan weeks: 12 weeks  Current Treatment Recommendations: Strengthening, ROM, Home Exercise Program, Safety Education & Training, Manual Therapy - Soft Tissue Mobilization, Neuromuscular Re-education, Manual Therapy - Joint Manipulation, Functional Mobility Training, Pain Management, Positioning, Modalities, Patient/Caregiver Education & Training  Timed Code Treatment Minutes: 64 Minutes     Therapy Time   Individual Concurrent Group Co-treatment   Time In 1104         Time Out 1200         Minutes 56         Timed Code Treatment Minutes: 56 Minutes  Electronically signed by Denise Castorena PT on 12/6/2021 at 12:15 PM

## 2021-12-07 ENCOUNTER — HOSPITAL ENCOUNTER (OUTPATIENT)
Dept: CT IMAGING | Age: 56
Discharge: HOME | End: 2021-12-07
Payer: COMMERCIAL

## 2021-12-07 DIAGNOSIS — R10.12 LEFT UPPER QUADRANT ABDOMINAL PAIN: ICD-10-CM

## 2021-12-07 PROCEDURE — 74170 CT ABD WO CNTRST FLWD CNTRST: CPT

## 2021-12-07 PROCEDURE — 6360000004 HC RX CONTRAST MEDICATION: Performed by: INTERNAL MEDICINE

## 2021-12-07 RX ADMIN — IOPAMIDOL 75 ML: 755 INJECTION, SOLUTION INTRAVENOUS at 08:39

## 2021-12-08 ENCOUNTER — HOSPITAL ENCOUNTER (OUTPATIENT)
Dept: PHYSICAL THERAPY | Age: 56
Setting detail: THERAPIES SERIES
Discharge: HOME | End: 2021-12-08
Payer: COMMERCIAL

## 2021-12-08 PROCEDURE — 90913 BFB TRAINING EA ADDL 15 MIN: CPT

## 2021-12-08 PROCEDURE — 97110 THERAPEUTIC EXERCISES: CPT

## 2021-12-08 PROCEDURE — 90912 BFB TRAINING 1ST 15 MIN: CPT

## 2021-12-08 NOTE — PROGRESS NOTES
ea  Exercise 17: scapular retraction 1 x 10                               Assessment:   Conditions Requiring Skilled Therapeutic Intervention  Body structures, Functions, Activity limitations: Decreased functional mobility ; Decreased ADL status; Decreased strength; Decreased endurance; Decreased coordination  Assessment: Patient did well with tx today with good pfm contractions at level 15-40 throughout session and relaxation at level 4-15 throughout session. She did have increased fatigue toward end of session but tolerated well.   Treatment Diagnosis: muscle weakness, stress incontinence  REQUIRES PT FOLLOW UP: Yes  Treatment Initiated : ther-ex    Goals:  Short term goals  Time Frame for Short term goals: 6 Weeks  Short term goal 1: Patient to have PERF score 2+/10//15/20- PROGRESS (11/22/2021: PERF score 2/10/15/20 *able to perform 2+ with max v.c.)  Short term goal 2: Patient to have good lumbopelvic stabilization in hooklying alone- MET (11/22/2021: Patient has good lumboplevic stabilization in hooklying alone)  Short term goal 3: Patient to have report of decreased saturation of pad when playing pickle ball- NOT MET (11/22/2021: Patient states she still has a lot of saturation when she plays pickle ball)  Short term goal 4: Patient to be independent with HEP- MET (11/22/2021: Patient states is independent with HEP but she likes to do them standing)  Long term goals  Time Frame for Long term goals : 12 Weeks  Long term goal 1: Patient to have decreased urinary incontinence by 50%- PROGRESS (11/22/2021: Patient reports decreased urinary incontinence 15%, with slight decline since last visit)  Long term goal 2: Patient to report decreased urgency and frequency of urine by 50%- PROGRESS (11/22/2021: Patient repots decreased urgency and frequency by 20%)  Long term goal 3: Patient to have good lumbopelvic stabilizaiton in hooklying with ue/le activity- MET (11/22/2021: patient has good lumbopelvic stabilization in hooklying with alt ue/le activity)  Long term goal 4: Patient to demo increase function demo'ed by scoring <= 15% impairment on PFIQ-7 urine section- NOT MET (11/22/2021: Patient scored 47.61% impairmenton PFIQ-7 urine section)  Patient Goals   Patient goals : decrease urinary incontinence  Plan:    Plan  Times per week: 2x/week  Plan weeks: 12 weeks  Current Treatment Recommendations: Strengthening, ROM, Home Exercise Program, Safety Education & Training, Manual Therapy - Soft Tissue Mobilization, Neuromuscular Re-education, Manual Therapy - Joint Manipulation, Functional Mobility Training, Pain Management, Positioning, Modalities, Patient/Caregiver Education & Training  Timed Code Treatment Minutes: 48 Minutes     Therapy Time   Individual Concurrent Group Co-treatment   Time In 3510         Time Out 9672         Minutes 53         Timed Code Treatment Minutes: 53 Minutes  Electronically signed by Dennis Sorenson PT on 12/8/2021 at 12:15 PM

## 2021-12-13 ENCOUNTER — HOSPITAL ENCOUNTER (OUTPATIENT)
Dept: PHYSICAL THERAPY | Age: 56
Setting detail: THERAPIES SERIES
Discharge: HOME | End: 2021-12-13
Payer: COMMERCIAL

## 2021-12-13 PROCEDURE — 97140 MANUAL THERAPY 1/> REGIONS: CPT

## 2021-12-13 PROCEDURE — 90912 BFB TRAINING 1ST 15 MIN: CPT

## 2021-12-13 PROCEDURE — 90913 BFB TRAINING EA ADDL 15 MIN: CPT

## 2021-12-13 PROCEDURE — 97110 THERAPEUTIC EXERCISES: CPT

## 2021-12-13 NOTE — PROGRESS NOTES
Physical Therapy  Daily Treatment Note  Date: 2021  Patient Name: Keisha Mckeon  MRN: [id number]     :   1965    Subjective:   General  Chart Reviewed: Yes  Response To Previous Treatment: Not applicable  Family / Caregiver Present: No  Referring Practitioner: Anabela Rivas  PT Visit Information  Onset Date: 21  PT Insurance Information: Medical [employer]  Total # of Visits Approved: 24  Total # of Visits to Date: 15  Plan of Care/Certification Expiration Date: 21  Progress Note Due Date: 21  Subjective  Subjective: Patient states she has a lot of stress with going out of town and [employer] activities coming. She says she woke with her back bothering her today and her right lateral leg has been bothering her.   Pain Screening  Patient Currently in Pain: No  Vital Signs  Patient Currently in Pain: No       Treatment Activities:                                    Exercises  Exercise 1: supine legs up wall x 3 min  Exercise 6: hs 90-90 neutral with er and ir 4 x 15 sec ea bilaterally  Exercise 7: supine right hip psoas, quad, IT band stm x 8 min, with roller x 0 min, abdominal connective tissue mobilization with skin rolling and cross arm tech x 5 min  Exercise 8: supine right hip modified maribel stretch 4 x 15 sec  Exercise 9: sidelying right hip obers stretch 0 x 15 sec  Exercise 10: bilateral quadratus stretch 4 x 15 sec ea-not today  Exercise 12: sidelying right hip obers stretch 4 x 15 sec ea  Exercise 14: sidelying on left pfm downtraining on biofeedback x 2 min, supine biofeedback pfm contraction alone in sidelying on left alone 10 sec x 7, with towel roll for hip add 10 sec x 7, sidelying on right 10 sec x 0 , supine hooklying alone 10 sec x 0, standing alone 10 sec x 10, seated alone 10 sec x 2,  with pillow for hip add 10 sec x 4  Exercise 15: PERF score 2/10/15/20  Exercise 16: left rib  7, 9 mwm with left shoulder extension and abduction isometric performed 5 sec x 5 ea  Exercise 17: scapular retraction 1 x 10                               Assessment:   Conditions Requiring Skilled Therapeutic Intervention  Body structures, Functions, Activity limitations: Decreased functional mobility ; Decreased ADL status; Decreased strength; Decreased endurance; Decreased coordination  Assessment: Patient did well with tx today with relaxation slightly higher today at 6-10 with contractions at 15-40 throughout session. She did have more difficulty in sitting and standing positions today with more fatigue noted, but she was able to tolerate all ex's. She continues to have thoracic paraspinal muscle tension and decreased rib mobility that is improved with manual therapy.   Treatment Diagnosis: muscle weakness, stress incontinence  REQUIRES PT FOLLOW UP: Yes  Treatment Initiated : ther-ex    Goals:  Short term goals  Time Frame for Short term goals: 6 Weeks  Short term goal 1: Patient to have PERF score 2+/10//15/20- PROGRESS (11/22/2021: PERF score 2/10/15/20 *able to perform 2+ with max v.c.)  Short term goal 2: Patient to have good lumbopelvic stabilization in hooklying alone- MET (11/22/2021: Patient has good lumboplevic stabilization in hooklying alone)  Short term goal 3: Patient to have report of decreased saturation of pad when playing pickle ball- NOT MET (11/22/2021: Patient states she still has a lot of saturation when she plays pickle ball)  Short term goal 4: Patient to be independent with HEP- MET (11/22/2021: Patient states is independent with HEP but she likes to do them standing)  Long term goals  Time Frame for Long term goals : 12 Weeks  Long term goal 1: Patient to have decreased urinary incontinence by 50%- PROGRESS (11/22/2021: Patient reports decreased urinary incontinence 15%, with slight decline since last visit)  Long term goal 2: Patient to report decreased urgency and frequency of urine by 50%- PROGRESS (11/22/2021: Patient repots decreased urgency and frequency by

## 2021-12-14 ENCOUNTER — OFFICE VISIT (OUTPATIENT)
Dept: INTERNAL MEDICINE | Age: 56
End: 2021-12-14
Payer: COMMERCIAL

## 2021-12-14 VITALS
HEIGHT: 67 IN | BODY MASS INDEX: 22.73 KG/M2 | HEART RATE: 76 BPM | WEIGHT: 144.8 LBS | RESPIRATION RATE: 18 BRPM | DIASTOLIC BLOOD PRESSURE: 66 MMHG | SYSTOLIC BLOOD PRESSURE: 124 MMHG | OXYGEN SATURATION: 99 %

## 2021-12-14 DIAGNOSIS — K22.4 ESOPHAGEAL SPASM: Primary | ICD-10-CM

## 2021-12-14 DIAGNOSIS — M54.50 CHRONIC LOW BACK PAIN, UNSPECIFIED BACK PAIN LATERALITY, UNSPECIFIED WHETHER SCIATICA PRESENT: ICD-10-CM

## 2021-12-14 DIAGNOSIS — G89.29 CHRONIC LOW BACK PAIN, UNSPECIFIED BACK PAIN LATERALITY, UNSPECIFIED WHETHER SCIATICA PRESENT: ICD-10-CM

## 2021-12-14 PROCEDURE — 99213 OFFICE O/P EST LOW 20 MIN: CPT | Performed by: INTERNAL MEDICINE

## 2021-12-14 RX ORDER — SUCRALFATE ORAL 1 G/10ML
1 SUSPENSION ORAL 4 TIMES DAILY
Qty: 1200 ML | Refills: 3 | Status: SHIPPED | OUTPATIENT
Start: 2021-12-14

## 2021-12-14 NOTE — PROGRESS NOTES
Chief Complaint   Patient presents with    2 Week Follow-Up     She continues to have some back pain. She says her esophageal spasms are some better. HPI: Fior Whipple is a 55 y.o. female is here for for follow-up of esophageal spasms. She thinks that she is doing better. Esophageal spasms are less frequent. She continues to see physical therapy for bladder leakage. She was told that some of her ribs were displaced. She states that she does think that the urinary leakage is improving. She does have some low back pain and muscle spasms at times. She is undergoing physical therapy. She does think that she may be doing too much in terms of playing pickle ball. She plans to scale back her activity somewhat otherwise, she is doing well and has no other major concerns or complaints. Past Medical History:   Diagnosis Date    Acid reflux     Anemia     BRCA negative     Chronic back pain     GERD (gastroesophageal reflux disease)     Hypothyroidism     Irritable bowel syndrome     Premature atrial contraction     Skipped heart beats     Thyroid disease     Thyroid mass     Vitamin D deficiency       Past Surgical History:   Procedure Laterality Date    BREAST ENHANCEMENT SURGERY      BREAST SURGERY      Augmentation,Biopsy+    CHOLECYSTECTOMY      COLONOSCOPY  2008???    [facility]    COLONOSCOPY  12/12/2012        COLONOSCOPY N/A 04/22/2021    Dr Lopez Flores, 5 yr recall    HEMORRHOID SURGERY      THYROID SURGERY      Biopsy    THYROID SURGERY      nodule removed    TONSILLECTOMY      TUBAL LIGATION      UPPER GASTROINTESTINAL ENDOSCOPY  2008? ?     Dr Nas Burk  02/12/2015        UPPER GASTROINTESTINAL ENDOSCOPY N/A 08/05/2019    Dr Carlos Hodge has non-erosive GERD with or without Esophageal dysmotility      Social History     Socioeconomic History    Marital status:      Spouse name: None    Number of children: None    Years of education: None    Highest education level: None   Occupational History    None   Tobacco Use    Smoking status: Never Smoker    Smokeless tobacco: Never Used   Vaping Use    Vaping Use: Never used   Substance and Sexual Activity    Alcohol use: Yes     Comment: couple times per week    Drug use: No    Sexual activity: Yes     Partners: Male     Birth control/protection: Surgical   Other Topics Concern    None   Social History Narrative    ** Merged History Encounter **          Social Determinants of Health     Financial Resource Strain: Low Risk     Difficulty of Paying Living Expenses: Not hard at all   Food Insecurity: No Food Insecurity    Worried About Running Out of Food in the Last Year: Never true    Nilesh of Food in the Last Year: Never true   Transportation Needs: No Transportation Needs    Lack of Transportation (Medical): No    Lack of Transportation (Non-Medical):  No   Physical Activity:     Days of Exercise per Week: Not on file    Minutes of Exercise per Session: Not on file   Stress:     Feeling of Stress : Not on file   Social Connections:     Frequency of Communication with Friends and Family: Not on file    Frequency of Social Gatherings with Friends and Family: Not on file    Attends [employer] Services: Not on file    Active Member of [employer] or Organizations: Not on file    Attends Club or Organization Meetings: Not on file    Marital Status: Not on file   Intimate Partner Violence:     Fear of Current or Ex-Partner: Not on file    Emotionally Abused: Not on file    Physically Abused: Not on file    Sexually Abused: Not on file   Housing Stability:     Unable to Pay for Housing in the Last Year: Not on file    Number of [place] in the Last Year: Not on file    Unstable Housing in the Last Year: Not on file      Family History   Problem Relation Age of Onset    Other Mother         brain bleed    Dementia Mother     High Blood Pressure Mother    [place] Lung Cancer Father          54, lung cancer    Cancer Father         Lung-Smoker    Breast Cancer Sister 50         at 47, did not wake up after a nap    Heart Attack Sister     Other Other         brain aneurysm    Colon Cancer Other     Cancer Maternal Grandfather         rectal cancer    Colon Cancer Maternal Grandfather     Colon Polyps Maternal Grandfather     Esophageal Cancer Neg Hx     Liver Cancer Neg Hx     Liver Disease Neg Hx     Rectal Cancer Neg Hx     Stomach Cancer Neg Hx         Current Outpatient Medications   Medication Sig Dispense Refill    sucralfate (CARAFATE) 1 GM/10ML suspension Take 10 mLs by mouth 4 times daily 1200 mL 3    albuterol sulfate  (90 Base) MCG/ACT inhaler Inhale 2 puffs into the lungs every 4 hours as needed      Triamcinolone Acetonide (NASACORT ALLERGY 24HR NA) by Nasal route daily as needed      levothyroxine (SYNTHROID) 75 MCG tablet TAKE 1 TABLET BY MOUTH ONE TIME A DAY 90 tablet 3    tiZANidine (ZANAFLEX) 4 MG tablet Take 1 tablet by mouth every 6 hours as needed (muscle spasms) 60 tablet 0    vitamin D (ERGOCALCIFEROL) 1.25 MG (64051 UT) CAPS capsule TAKE 1 CAPSULE BY MOUTH ONCE A WEEK 12 capsule 3    lansoprazole (PREVACID) 30 MG delayed release capsule TAKE ONE CAPSULE TWO TIMES A  capsule 3    loratadine (CLARITIN) 10 MG capsule Take 10 mg by mouth daily      Alum Hydroxide-Mag Carbonate (GAVISCON PO) Take by mouth nightly       montelukast (SINGULAIR) 10 MG tablet Take 1 tablet by mouth nightly (Patient not taking: Reported on 2021) 30 tablet 5    conjugated estrogens (PREMARIN) 0.625 MG/GM vaginal cream Place 0.5 g vaginally daily (Patient not taking: Reported on 2021) 1 Tube 3     No current facility-administered medications for this visit.         Patient Active Problem List   Diagnosis    LLQ abdominal pain    Chronic constipation    Epigastric pressure    Heartburn    Indigestion    Acid reflux    Esophageal spasm    Hemorrhoids    Chest pain    Breast tenderness in female    Breast mass, left    History of breast augmentation    Iron deficiency anemia secondary to inadequate dietary iron intake        Review of Systems   Constitutional: Negative for activity change, appetite change, chills, diaphoresis, fatigue, fever and unexpected weight change. HENT: Negative for congestion, ear pain, facial swelling, hearing loss, mouth sores, nosebleeds, postnasal drip, rhinorrhea, sinus pressure, sneezing, sore throat, tinnitus, trouble swallowing and voice change. Eyes: Negative for photophobia, pain, discharge, redness, itching and visual disturbance. Respiratory: Negative for cough, choking, chest tightness, shortness of breath and wheezing. Cardiovascular: Negative for chest pain, palpitations and leg swelling. Gastrointestinal: Negative for abdominal distention, abdominal pain, anal bleeding, blood in stool, constipation, diarrhea, nausea, rectal pain and vomiting. Esophageal spasms are improving   Endocrine: Negative for cold intolerance, heat intolerance, polydipsia, polyphagia and polyuria. Genitourinary: Negative for decreased urine volume, difficulty urinating, dysuria, flank pain, frequency, hematuria and urgency. Musculoskeletal: Positive for arthralgias and back pain. Negative for gait problem, joint swelling, myalgias, neck pain and neck stiffness. Skin: Negative for color change, pallor and rash. Allergic/Immunologic: Negative for environmental allergies and food allergies. Neurological: Negative for dizziness, tremors, syncope, weakness, light-headedness, numbness and headaches. Hematological: Negative for adenopathy. Does not bruise/bleed easily. Psychiatric/Behavioral: Negative for agitation, behavioral problems, confusion, decreased concentration, dysphoric mood, hallucinations, self-injury, sleep disturbance and suicidal ideas.  The patient is not nervous/anxious and is not hyperactive. /66 (Site: Left Upper Arm, Position: Sitting)   Pulse 76   Resp 18   Ht 5' 7\" (1.702 m)   Wt 144 lb 12.8 oz (65.7 kg)   SpO2 99%   BMI 22.68 kg/m²   Physical Exam  Vitals and nursing note reviewed. Constitutional:       General: She is not in acute distress. Appearance: Normal appearance. She is normal weight. She is not ill-appearing, toxic-appearing or diaphoretic. HENT:      Head: Normocephalic and atraumatic. Right Ear: Tympanic membrane, ear canal and external ear normal. There is no impacted cerumen. Left Ear: Tympanic membrane, ear canal and external ear normal. There is no impacted cerumen. Nose: Nose normal. No congestion or rhinorrhea. Mouth/Throat:      Mouth: Mucous membranes are moist.      Pharynx: Oropharynx is clear. No oropharyngeal exudate or posterior oropharyngeal erythema. Eyes:      General: No scleral icterus. Right eye: No discharge. Left eye: No discharge. Extraocular Movements: Extraocular movements intact. Conjunctiva/sclera: Conjunctivae normal.      Pupils: Pupils are equal, round, and reactive to light. Neck:      Vascular: No carotid bruit. Cardiovascular:      Rate and Rhythm: Normal rate and regular rhythm. Pulses: Normal pulses. Heart sounds: Normal heart sounds. No murmur heard. No friction rub. No gallop. Pulmonary:      Effort: Pulmonary effort is normal. No respiratory distress. Breath sounds: Normal breath sounds. No stridor. No wheezing, rhonchi or rales. Chest:      Chest wall: No tenderness. Abdominal:      General: There is no distension. Tenderness: There is no abdominal tenderness. There is no guarding. Comments: Mild left upper quadrant tenderness noted   Musculoskeletal:         General: Tenderness present. No swelling, deformity or signs of injury. Cervical back: Normal range of motion and neck supple. No rigidity. No muscular tenderness. Right lower leg: No edema. Left lower leg: No edema. Comments: There is some tenderness on palpation of the right hip. Lymphadenopathy:      Cervical: No cervical adenopathy. Skin:     General: Skin is warm and dry. Capillary Refill: Capillary refill takes less than 2 seconds. Neurological:      General: No focal deficit present. Mental Status: She is alert and oriented to person, place, and time. Mental status is at baseline. Psychiatric:         Mood and Affect: Mood normal.         Behavior: Behavior normal.         Thought Content: Thought content normal.         Judgment: Judgment normal.         1. Esophageal spasm    2. Chronic low back pain, unspecified back pain laterality, unspecified whether sciatica present        ASSESSMENT/PLAN:    69-year-old woman here for follow-up of esophageal spasms    1. Esophageal spasms: Much improved. Continue lansoprazole as prescribed. 2. Back pain: Improving. Continue physical therapy    Guanakito Bailon was seen today for 2 week follow-up. Diagnoses and all orders for this visit:    Esophageal spasm    Chronic low back pain, unspecified back pain laterality, unspecified whether sciatica present    Other orders  -     sucralfate (CARAFATE) 1 GM/10ML suspension; Take 10 mLs by mouth 4 times daily          No follow-ups on file. No orders of the defined types were placed in this encounter.       Chelsey Sanchez MD

## 2021-12-15 ENCOUNTER — HOSPITAL ENCOUNTER (OUTPATIENT)
Dept: PHYSICAL THERAPY | Age: 56
Setting detail: THERAPIES SERIES
Discharge: HOME | End: 2021-12-15
Payer: COMMERCIAL

## 2021-12-15 PROCEDURE — 90913 BFB TRAINING EA ADDL 15 MIN: CPT

## 2021-12-15 PROCEDURE — 97140 MANUAL THERAPY 1/> REGIONS: CPT

## 2021-12-15 PROCEDURE — 97110 THERAPEUTIC EXERCISES: CPT

## 2021-12-15 PROCEDURE — 90912 BFB TRAINING 1ST 15 MIN: CPT

## 2021-12-15 ASSESSMENT — ENCOUNTER SYMPTOMS
COLOR CHANGE: 0
EYE DISCHARGE: 0
RECTAL PAIN: 0
CHEST TIGHTNESS: 0
ANAL BLEEDING: 0
EYE PAIN: 0
VOICE CHANGE: 0
FACIAL SWELLING: 0
SINUS PRESSURE: 0
ABDOMINAL DISTENTION: 0
BLOOD IN STOOL: 0
CONSTIPATION: 0
BACK PAIN: 1
RHINORRHEA: 0
TROUBLE SWALLOWING: 0
EYE ITCHING: 0
CHOKING: 0
EYE REDNESS: 0
NAUSEA: 0
ABDOMINAL PAIN: 0
PHOTOPHOBIA: 0
WHEEZING: 0
SHORTNESS OF BREATH: 0
VOMITING: 0
DIARRHEA: 0
SORE THROAT: 0
COUGH: 0

## 2021-12-27 ENCOUNTER — APPOINTMENT (OUTPATIENT)
Dept: PHYSICAL THERAPY | Age: 56
End: 2021-12-27
Payer: COMMERCIAL

## 2021-12-28 ENCOUNTER — APPOINTMENT (OUTPATIENT)
Dept: PHYSICAL THERAPY | Age: 56
End: 2021-12-28
Payer: COMMERCIAL

## 2021-12-30 ENCOUNTER — HOSPITAL ENCOUNTER (OUTPATIENT)
Dept: PHYSICAL THERAPY | Age: 56
Setting detail: THERAPIES SERIES
Discharge: HOME | End: 2021-12-30
Payer: COMMERCIAL

## 2021-12-30 DIAGNOSIS — N39.46 MIXED STRESS AND URGE URINARY INCONTINENCE: Primary | ICD-10-CM

## 2021-12-30 PROCEDURE — 97110 THERAPEUTIC EXERCISES: CPT

## 2021-12-30 PROCEDURE — 97140 MANUAL THERAPY 1/> REGIONS: CPT

## 2021-12-30 NOTE — PROGRESS NOTES
Physical Therapy  Daily Treatment Note/ REASSESSMENT  Date: 2021  Patient Name: Myrna Rizo  MRN: [id number]     :   1965    Subjective:   General  Chart Reviewed: Yes  Response To Previous Treatment: Not applicable  Family / Caregiver Present: No  Referring Practitioner: Rachel Constantino  PT Visit Information  Onset Date: 21  PT Insurance Information: [employer]  Total # of Visits Approved: 24  Total # of Visits to Date: 12  Plan of Care/Certification Expiration Date: 21  Progress Note Due Date: 22  Subjective  Subjective: Patient states she hasn't done her exercises like usual due to the holiday. She says that she ran on her treadmill and didn't have to get off the treadmill to urinate and had some leakage but said it wasn't terrible .   Pain Screening  Patient Currently in Pain: No  Vital Signs  Patient Currently in Pain: No       Treatment Activities:                                    Exercises  Exercise 1: supine legs up wall x 3 min- not today  Exercise 6: hs 90-90 neutral with er and ir 4 x 15 sec ea bilaterally, pririformis > 90 degrees 4 x 15 sec ea  Exercise 7: supine right hip psoas, quad, IT band, hip add stm x 10 min, with roller x 0 min, abdominal connective tissue mobilization with skin rolling and cross arm tech x 0 min, stm to bilateral thoracic paraspinal region  Exercise 8: supine right hip modified maribel stretch 4 x 15 sec  Exercise 9: sidelying right hip obers stretch 4 x 15 sec  Exercise 10: bilateral quadratus stretch 4 x 15 sec ea  Exercise 14: sidelying on left pfm downtraining on biofeedback x 2 min, supine biofeedback pfm contraction alone in sidelying on left alone 10 sec x 6, with towel roll for hip add 10 sec x 7, sidelying on right 10 sec x 0 , supine hooklying alone 10 sec x 0, standing alone 10 sec x 5, seated alone 10 sec x 0,  with pillow for hip add 10 sec x 0, supine inclined position with pilow for hip add 10 sec x 10- no biofeedback today due to PERF scoring  Exercise 16: left rib 5, 7, 8 mwm with left shoulder extension and abduction isometric performed 5 sec x 5 ea- not today, stm to left thoracic paraspinals x 3 min  Exercise 17: scapular retraction 1 x 10- not today  Exercise 18: PERF score 2+/10/15/20                               Assessment:   Conditions Requiring Skilled Therapeutic Intervention  Body structures, Functions, Activity limitations: Decreased functional mobility ; Decreased ADL status; Decreased strength;Decreased endurance;Decreased coordination  Assessment: Patient did well with tx today with increased ability to perform pfm contractions with decreased compensation activities and no need for v.c. for proper tech. She was able to perform increasd reps demo'ing increased activity tolerance and she reported decreased overall urgency and leakage of urine. She will continue to benefit from skilled PT, but due to switching to new insurance carrier will be d/c'ed and new eval will be completed at next visit.   Treatment Diagnosis: muscle weakness, stress incontinence  REQUIRES PT FOLLOW UP: Yes (after new eval due to new insurance)    Goals:  Short term goals  Time Frame for Short term goals: 6 Weeks  Short term goal 1: Patient to have PERF score 2+/10//15/20- MET (12/30/2021: PERF score 2+/10/15/20)  Short term goal 2: Patient to have good lumbopelvic stabilization in hooklying alone- MET (11/22/2021: Patient has good lumboplevic stabilization in hooklying alone)  Short term goal 3: Patient to have report of decreased saturation of pad when playing pickle ball- NOT MET (12/30/2021: Patient states she still has a lot of saturation when she plays pickle ball, but noted improvement with running on treadmill)  Short term goal 4: Patient to be independent with HEP- MET (11/22/2021: Patient states is independent with HEP but she likes to do them standing)  Long term goals  Time Frame for Long term goals : 12 Weeks  Long term goal 1: Patient to have decreased urinary incontinence by 50%- PROGRESS (12/30/2021: Patient reports decreased urinary incontinence 25%)  Long term goal 2: Patient to report decreased urgency and frequency of urine by 50%- PROGRESS (12/30/2021: Patient repots decreased urgency and frequency by 40%)  Long term goal 3: Patient to have good lumbopelvic stabilizaiton in hooklying with ue/le activity- MET (12/30/2021: patient has good lumbopelvic stabilization in hooklying with alt ue/le activity)  Long term goal 4: Patient to demo increase function demo'ed by scoring <= 15% impairment on PFIQ-7 urine section- NOT MET (11/22/2021: Patient scored 47.61% impairmenton PFIQ-7 urine section)  Patient Goals   Patient goals : decrease urinary incontinence  Plan:    Plan  Plan Comment: chart to be d/c'ed due to new eval next visit and new chart for new insurance carrier  Timed Code Treatment Minutes: 59 Minutes     Therapy Time   Individual Concurrent Group Co-treatment   Time In 1100         Time Out 1159         Minutes 59         Timed Code Treatment Minutes: 59 Minutes  Electronically signed by Claudio Polk, PT on 12/30/2021 at 12:18 PM

## 2021-12-30 NOTE — PROGRESS NOTES
[facility]  OUTPATIENT PHYSICAL THERAPY  DISCHARGE SUMMARY    Date: 2021  Patient Name: Lyric Sharma        MRN: [id number]    ACCOUNT #: [de-identified]  : 1965  (55 y.o.)  Gender: female   Referring Practitioner: Margert Goltz  Diagnosis: Mixed stress and urge urinary incontinence N39.46  Referral Date : 21  Treatment Diagnosis: muscle weakness, stress incontinence    Total # of Visits Approved: 24  Total # of Visits to Date: 16    Subjective  Subjective: Patient states she hasn't done her exercises like usual due to the holiday. She says that she ran on her treadmill and didn't have to get off the treadmill to urinate and had some leakage but said it wasn't terrible . Objective  Treatments received include: ther-ex, manual, biofeedback  See objective/subjective data in goals    Assessment  Assessment: Patient did well with tx today with increased ability to perform pfm contractions with decreased compensation activities and no need for v.c. for proper tech. She was able to perform increasd reps demo'ing increased activity tolerance and she reported decreased overall urgency and leakage of urine. She will continue to benefit from skilled PT, but due to switching to new insurance carrier will be d/c'ed and new eval will be completed at next visit.            Plan  D/C with independent HEP          Goals  Short term goals  Time Frame for Short term goals: 6 Weeks  Short term goal 1: Patient to have PERF score 2+/10//15/20- MET (2021: PERF score 2+/10/15/20)  Short term goal 2: Patient to have good lumbopelvic stabilization in hooklying alone- MET (2021: Patient has good lumboplevic stabilization in hooklying alone)  Short term goal 3: Patient to have report of decreased saturation of pad when playing pickle ball- NOT MET (2021: Patient states she still has a lot of saturation when she plays pickle ball, but noted improvement with running on treadmill)  Short

## 2022-01-04 ENCOUNTER — APPOINTMENT (OUTPATIENT)
Dept: PHYSICAL THERAPY | Age: 57
End: 2022-01-04
Payer: COMMERCIAL

## 2022-01-06 ENCOUNTER — APPOINTMENT (OUTPATIENT)
Dept: PHYSICAL THERAPY | Age: 57
End: 2022-01-06
Payer: COMMERCIAL

## 2022-01-11 ENCOUNTER — HOSPITAL ENCOUNTER (OUTPATIENT)
Dept: PHYSICAL THERAPY | Age: 57
Setting detail: THERAPIES SERIES
Discharge: HOME | End: 2022-01-11
Payer: COMMERCIAL

## 2022-01-11 PROCEDURE — 97140 MANUAL THERAPY 1/> REGIONS: CPT

## 2022-01-11 NOTE — PROGRESS NOTES
Physical Therapy  Initial Assessment  Date: 2022  Patient Name: Devante Higgins  MRN: [id number]  : 1965     Treatment Diagnosis: muscle weakness, stress incontinence         Subjective   General  Chart Reviewed: Yes  Patient assessed for rehabilitation services?: Yes  Response To Previous Treatment: Not applicable  Family / Caregiver Present: No  Referring Practitioner: Snow Blair  Referral Date : 21  Diagnosis: Mixed stress and urge urinary incontinence N39.46  Follows Commands: Within Functional Limits  PT Visit Information  Onset Date: 22  PT Insurance Information: BCBS  Total # of Visits Approved: 24  Total # of Visits to Date: 1  Plan of Care/Certification Expiration Date: 22  Progress Note Due Date: 02/10/22  Subjective  Subjective: Patient states she continues to have urine leakage with her pickel ball playing. She says that she had to go several times this morning more than usual when at pickel ball. She says that her urge was better on her vacation.   Pain Screening  Patient Currently in Pain: No  Vital Signs  Patient Currently in Pain: No         Orientation  Orientation  Overall Orientation Status: Within Normal Limits    Social/Functional History  Social/Functional History  Lives With: Spouse  Type of Home: House  ADL Assistance: Independent  Homemaking Assistance: Independent  Active : Yes  Occupation: Unemployed  Leisure & Hobbies: has leakage with sports including pickle ball  IADL Comments: sometimes she has to take breaks intermittently with driving, will have to use the bathroom when she is out    Objective     Observation/Palpation  Posture: Good  Palpation: mod tenderness to right low back region throughout paraspinals with increased muscular tension, increased muscular tension and tenderness to right psoas, quad and lateral and medial hip region throughout proximal region, tenderness to right greater trochanter and IT band  Observation: right SI anterior mobility decreased, increased with overpressure manually    PROM RLE (degrees)  RLE PROM: WNL  AROM RLE (degrees)  RLE AROM: WNL  PROM LLE (degrees)  LLE PROM: WNL  AROM LLE (degrees)  LLE AROM : WNL    Strength RLE  R Hip Flexion: 5/5  R Hip ABduction: 5/5  R Hip ADduction: 5/5  R Hip Internal Rotation: 5/5  R Hip External Rotation: 5/5  R Knee Flexion: 5/5  R Knee Extension: 5/5  R Ankle Dorsiflexion: 5/5  R Ankle Plantar flexion: 5/5  Strength LLE  L Hip Flexion: 5/5  L Hip ABduction: 5/5  L Hip ADduction: 5/5  L Hip Internal Rotation: 5/5  L Hip External Rotation:  (5-/5)  L Knee Flexion: 5/5  L Knee Extension: 5/5  L Ankle Dorsiflexion: 5/5  L Ankle Plantar Flexion: 5/5     Additional Measures  Other: PFIQ-7 22.22% impairment total, urine section 38.09% impairment  Sensation  Overall Sensation Status: WNL                   Exercises  Exercise 6: hs 90-90 neutral with er and ir 4 x 15 sec ea bilaterally, pririformis > 90 degrees 4 x 15 sec ea  Exercise 7: supine right hip psoas, quad, IT band, hip add stm x 10 min, with roller x 3 min, abdominal connective tissue mobilization with skin rolling and cross arm tech x 0 min, stm to bilateral thoracic/lumbarright  paraspinal region x 3 min  Exercise 9: sidelying right hip obers stretch 4 x 15 sec  Exercise 15: stm internally with sweeping and trigger point release to right obturator internus and levator ani x 8 min  Exercise 18: PERF score 2/10/15/20  Ortho Screen  Pelvic Alignment: right sacrum posteriorly rotated, corrected with overpressure  Abdominals  Diatasis: none  Functional Stability: good  Abdominals: good lumbopelvic stabilization in hooklying alone and with ue/le activity  Pelvic Floor  Perineal Descent: min  Skin Condition: normal  Excursion: min to none  External Clock: no tenderness  Prolapse Test: grade 1 cystocele  Internal Clock: moderate increased muscle bulk on right compared to left with mod to max tenderness throughout obturator internus and levator ani  Sensation: normal  Vaginal Vault: none  Muscle Power: 2  Muscle Endurance (Seconds): 10 Seconds  Number Reps to Fatigue: 15  Muscle Flicks: 20  Quality of Contraction: symmetrical contraction with no lift, mod v.c. to have proper contraction initially was bulding out for contraction                   Assessment   Conditions Requiring Skilled Therapeutic Intervention  Body structures, Functions, Activity limitations: Decreased functional mobility ; Decreased ADL status; Decreased strength;Decreased endurance;Decreased coordination  Assessment: Patient has decreased pfm strength, increased muscular tension throughout pelvic floor, right hip and low/mid back, regular occurrance of urinary incontinence, pelvic discomfort with intercourse and right hip/back pain. Patient will benefit from skilled PT to increase strength of pfm's, decreased muscle tension, decreased pain with intercourse and decrease urinary incontinence.   Treatment Diagnosis: muscle weakness, stress incontinence  Prognosis: Good  Decision Making: High Complexity  History: vaginal child birth x 2, herniated disc, post menopausal  Exam: decreasd strength, urinary urge and incontinence, increased muscle tension  Clinical Presentation: unstable  REQUIRES PT FOLLOW UP: Yes (after new eval due to new insurance)  Treatment Initiated : ther-ex, manual         Plan   Plan  Times per week: 2x/week  Plan weeks: 12 weeks  Current Treatment Recommendations: Strengthening,ROM,Home Exercise Program,Safety Education & Training,Manual Therapy - Soft Tissue Mobilization,Neuromuscular Re-education,Manual Therapy - Joint Manipulation,Functional Mobility Training,Pain Management,Positioning,Modalities,Patient/Caregiver Education & Training    Goals  Short term goals  Time Frame for Short term goals: 6 Weeks  Short term goal 1: Patient to have PERF score 2+/10//15/20  Short term goal 2: Patient to have min to no tenderness throughout right obturator internus and levator ani internally  Short term goal 3: Patient to have report of decreased saturation of pad when playing pickle ball  Short term goal 4: Patient to be independent with HEP  Long term goals  Time Frame for Long term goals : 12 Weeks  Long term goal 1: Patient to have decreased urinary incontinence by 50%  Long term goal 2: Patient to report decreased urgency and frequency of urine by 50%  Long term goal 3: Patient to have min to no tenderness throughout right lateral and medial hip, IT band, and paraspinals of lumbar  Long term goal 4: Patient to demo increase function demo'ed by scoring <= 15% impairment on PFIQ-7 urine section  Patient Goals   Patient goals : decrease urinary incontinence       Therapy Time   Individual Concurrent Group Co-treatment   Time In 1049         Time Out 1200         Minutes 71         Timed Code Treatment Minutes: 27 Minutes  Electronically signed by Vaibhav Diaz PT on 1/11/2022 at 12:30 PM

## 2022-01-13 ENCOUNTER — HOSPITAL ENCOUNTER (OUTPATIENT)
Dept: PHYSICAL THERAPY | Age: 57
Setting detail: THERAPIES SERIES
Discharge: HOME | End: 2022-01-13
Payer: COMMERCIAL

## 2022-01-13 PROCEDURE — 90913 BFB TRAINING EA ADDL 15 MIN: CPT

## 2022-01-13 PROCEDURE — 97110 THERAPEUTIC EXERCISES: CPT

## 2022-01-13 PROCEDURE — 90912 BFB TRAINING 1ST 15 MIN: CPT

## 2022-01-13 PROCEDURE — 97140 MANUAL THERAPY 1/> REGIONS: CPT

## 2022-01-13 NOTE — PROGRESS NOTES
Physical Therapy  Daily Treatment Note  Date: 2022  Patient Name: Emelia Holloway  MRN: [id number]     :   1965    Subjective:   General  Chart Reviewed: Yes  Response To Previous Treatment: Not applicable  Family / Caregiver Present: No  Referring Practitioner: Alexandru Crawley  PT Visit Information  Onset Date: 22  PT Insurance Information: BCBS  Total # of Visits Approved: 24  Total # of Visits to Date: 2  Plan of Care/Certification Expiration Date: 22  Progress Note Due Date: 02/10/22  Subjective  Subjective: Patient states she is having right hip pain, but says yesterday wasn't as bad. She says it was decreased some the night after her therapy session. Patient states her hip pain is about 6-7/10. Patient states she had her usual leakage this morning.   Pain Screening  Patient Currently in Pain: No  Vital Signs  Patient Currently in Pain: No       Treatment Activities:                                    Exercises  Exercise 1: supine legs up wall x 3 min  Exercise 6: hs 90-90 neutral with er and ir 4 x 15 sec ea bilaterally, pririformis > 90 degrees 4 x 15 sec ea  Exercise 7: supine right hip psoas, quad, IT band, hip add stm x 10 min, with roller x 3 min, abdominal connective tissue mobilization with skin rolling and cross arm tech x 0 min, stm to bilateral thoracic/lumbarright  paraspinal region x 3 min  Exercise 9: sidelying right hip obers stretch leg bent and straight 4 x 15 sec ea  Exercise 14: sidelying on left pfm downtraining on biofeedback x 2 min,  biofeedback pfm contraction alone in sidelying on left alone 10 sec x 10, with towel roll for hip add 10 sec x 10, sidelying on right 10 sec x 0 , supine hooklying alone 10 sec x 5, supine legs extended 10 sec x 5, standing alone 10 sec x 0, seated alone 10 sec x 0,  with pillow for hip add 10 sec x 0, supine inclined position with pilow for hip add 10 sec x 0  Exercise 15: stm internally with sweeping and trigger point release to right obturator internus and levator ani x 8 min- not today  Exercise 18: PERF score 2/10/15/20                               Assessment:   Conditions Requiring Skilled Therapeutic Intervention  Body structures, Functions, Activity limitations: Decreased functional mobility ; Decreased ADL status; Decreased strength;Decreased endurance;Decreased coordination  Assessment: Patient did well with tx today with decreased tension noted on biofeedback with level 4-10 in sidelying and supine today. She was able to contract at level 10-30 thorughout session with decreased difficulty while using accessory muscles. She did need mod v.c. to maintain contraction but did much better with using core strength and maintaining breathing.   Treatment Diagnosis: muscle weakness, stress incontinence  REQUIRES PT FOLLOW UP: Yes (after new eval due to new insurance)    Goals:  Short term goals  Time Frame for Short term goals: 6 Weeks  Short term goal 1: Patient to have PERF score 2+/10//15/20  Short term goal 2: Patient to have min to no tenderness throughout right obturator internus and levator ani internally  Short term goal 3: Patient to have report of decreased saturation of pad when playing pickle ball  Short term goal 4: Patient to be independent with HEP  Long term goals  Time Frame for Long term goals : 12 Weeks  Long term goal 1: Patient to have decreased urinary incontinence by 50%  Long term goal 2: Patient to report decreased urgency and frequency of urine by 50%  Long term goal 3: Patient to have min to no tenderness throughout right lateral and medial hip, IT band, and paraspinals of lumbar  Long term goal 4: Patient to demo increase function demo'ed by scoring <= 15% impairment on PFIQ-7 urine section  Patient Goals   Patient goals : decrease urinary incontinence  Plan:    Plan  Times per week: 2x/week  Plan weeks: 12 weeks  Current Treatment Recommendations: Strengthening,ROM,Home Exercise Program,Safety Education & Training,Manual Therapy - Soft Tissue Mobilization,Neuromuscular Re-education,Manual Therapy - Joint Manipulation,Functional Mobility Training,Pain Management,Positioning,Modalities,Patient/Caregiver Education & Training  Timed Code Treatment Minutes: 58 Minutes     Therapy Time   Individual Concurrent Group Co-treatment   Time In 7245         Time Out 1100         Minutes 62         Timed Code Treatment Minutes: 62 Minutes  Electronically signed by Rodríguez Caraballo PT on 1/13/2022 at 11:34 AM

## 2022-01-17 ENCOUNTER — APPOINTMENT (OUTPATIENT)
Dept: PHYSICAL THERAPY | Age: 57
End: 2022-01-17
Payer: COMMERCIAL

## 2022-01-19 ENCOUNTER — HOSPITAL ENCOUNTER (OUTPATIENT)
Dept: PHYSICAL THERAPY | Age: 57
Setting detail: THERAPIES SERIES
Discharge: HOME | End: 2022-01-19
Payer: COMMERCIAL

## 2022-01-19 PROCEDURE — 97140 MANUAL THERAPY 1/> REGIONS: CPT

## 2022-01-19 PROCEDURE — 97110 THERAPEUTIC EXERCISES: CPT

## 2022-01-19 NOTE — PROGRESS NOTES
Physical Therapy  Daily Treatment Note  Date: 2022  Patient Name: Vishal Henning  MRN: [id number]     :   1965    Subjective:   General  Chart Reviewed: Yes  Response To Previous Treatment: Not applicable  Family / Caregiver Present: No  Referring Practitioner: Jason Acosta  PT Visit Information  Onset Date: 22  PT Insurance Information: BCBS  Total # of Visits Approved: 24  Total # of Visits to Date: 3  Plan of Care/Certification Expiration Date: 22  Progress Note Due Date: 02/10/22  Subjective  Subjective: Patient states she is hurting in her back today and isn't sure how much she is going to be able to do.   Pain Screening  Patient Currently in Pain: Yes  Vital Signs  Patient Currently in Pain: Yes       Treatment Activities:                                    Exercises  Exercise 1: supine legs up wall x 3 min- not today  Exercise 2: supine ta contraction alone 10 sec x 10, alt ue 1 x 10, alt le 1 x 10, alt ue/le 1 x 10  Exercise 3: supine knee push isometric into hip ext on left 5 sec x5, into hip flex on right 5 sec x 5 (x 2)  Exercise 4: supine pelvic tilts 1 x 10  Exercise 5: supine bilateral lumbar rotation 1 x 10, bilateral quadratus stretch 4 x 15 sec ea  Exercise 6: hs 90-90 neutral with er and ir 4 x 15 sec ea bilaterally, pririformis > 90 degrees 4 x 15 sec ea  Exercise 7: supine right hip psoas, quad, IT band, hip add stm x 10 min, with roller x 0 min,  stm to bilateral thoracic/lumbarright  paraspinal, gluteals, piriforms region x 10 min  Exercise 9: sidelying right hip obers stretch leg bent and straight 4 x 15 sec ea  Exercise 10: prayer stretch neutral, left and right 4 x 15 sec ea, seated forward bend neutral, left, right 4 x 15 sec  Exercise 11: prone SI overpressure 5 sec x 5  Exercise 12: left L3, 4 mwm with right thoracic rotation 5 sec x 5  Exercise 14: sidelying on left pfm downtraining on biofeedback x 2 min,  biofeedback pfm contraction alone in sidelying on left alone 10 sec x 10, with towel roll for hip add 10 sec x 10, sidelying on right 10 sec x 0 , supine hooklying alone 10 sec x 5, supine legs extended 10 sec x 5, standing alone 10 sec x 0, seated alone 10 sec x 0,  with pillow for hip add 10 sec x 0, supine inclined position with pilow for hip add 10 sec x 0- not today  Exercise 15: stm internally with sweeping and trigger point release to right obturator internus and levator ani x 8 min- not today  Exercise 18: PERF score 2/10/15/20                               Assessment:   Conditions Requiring Skilled Therapeutic Intervention  Body structures, Functions, Activity limitations: Decreased functional mobility ; Decreased ADL status; Decreased strength;Decreased endurance;Decreased coordination  Assessment: Patient did fair with tx today, but h ad increased muscle tension noted throughout low back and gluteals and piriformis region. She tolerated stretching well and reported decreased discomfort after session. No biofeedback or pfm ex's due to high back pain.   Treatment Diagnosis: muscle weakness, stress incontinence  REQUIRES PT FOLLOW UP: Yes (after new eval due to new insurance)    Goals:  Short term goals  Time Frame for Short term goals: 6 Weeks  Short term goal 1: Patient to have PERF score 2+/10//15/20  Short term goal 2: Patient to have min to no tenderness throughout right obturator internus and levator ani internally  Short term goal 3: Patient to have report of decreased saturation of pad when playing pickle ball  Short term goal 4: Patient to be independent with HEP  Long term goals  Time Frame for Long term goals : 12 Weeks  Long term goal 1: Patient to have decreased urinary incontinence by 50%  Long term goal 2: Patient to report decreased urgency and frequency of urine by 50%  Long term goal 3: Patient to have min to no tenderness throughout right lateral and medial hip, IT band, and paraspinals of lumbar  Long term goal 4: Patient to demo increase function demo'ed by scoring <= 15% impairment on PFIQ-7 urine section  Patient Goals   Patient goals : decrease urinary incontinence  Plan:    Plan  Times per week: 2x/week  Plan weeks: 12 weeks  Current Treatment Recommendations: Strengthening,ROM,Home Exercise Program,Safety Education & Training,Manual Therapy - Soft Tissue Mobilization,Neuromuscular Re-education,Manual Therapy - Joint Manipulation,Functional Mobility Training,Pain Management,Positioning,Modalities,Patient/Caregiver Education & Training        Therapy Time   Individual Concurrent Group Co-treatment   Time In 0906         Time Out 7431         Minutes 46            Electronically signed by Carla Junior PT on 1/19/2022 at 10:02 AM

## 2022-01-26 ENCOUNTER — VIRTUAL VISIT (OUTPATIENT)
Dept: INTERNAL MEDICINE | Age: 57
End: 2022-01-26
Payer: COMMERCIAL

## 2022-01-26 ENCOUNTER — TELEPHONE (OUTPATIENT)
Dept: INTERNAL MEDICINE | Age: 57
End: 2022-01-26

## 2022-01-26 ENCOUNTER — APPOINTMENT (OUTPATIENT)
Dept: PHYSICAL THERAPY | Age: 57
End: 2022-01-26
Payer: COMMERCIAL

## 2022-01-26 DIAGNOSIS — R53.83 OTHER FATIGUE: ICD-10-CM

## 2022-01-26 DIAGNOSIS — Z20.822 CLOSE EXPOSURE TO COVID-19 VIRUS: ICD-10-CM

## 2022-01-26 PROCEDURE — 99213 OFFICE O/P EST LOW 20 MIN: CPT | Performed by: NURSE PRACTITIONER

## 2022-01-26 ASSESSMENT — ENCOUNTER SYMPTOMS
COUGH: 1
CONSTIPATION: 0
BLOOD IN STOOL: 0
COLOR CHANGE: 0
DIARRHEA: 0
VOMITING: 0
EYE DISCHARGE: 0
STRIDOR: 0
ABDOMINAL PAIN: 0
NAUSEA: 0
SORE THROAT: 0
WHEEZING: 0
SHORTNESS OF BREATH: 0
RHINORRHEA: 1
TROUBLE SWALLOWING: 0
ABDOMINAL DISTENTION: 0
CHOKING: 0
EYE ITCHING: 0

## 2022-01-26 NOTE — TELEPHONE ENCOUNTER
Patient calling in as she was exposed to covid and is not feeling well, symptoms are nasal drainage,pressure in upper chest and back, low grade temp, head pressure,weak.  Patient states she has had covid before and would like to be tested    Please call and advise

## 2022-01-26 NOTE — PROGRESS NOTES
Physical Therapy  Daily Treatment Note  Date: 12/15/2021  Patient Name: Twanna Leyden  MRN: [id number]     :   1965    Subjective:   General  Chart Reviewed: Yes  Response To Previous Treatment: Not applicable  Family / Caregiver Present: No  Referring Practitioner: Azul Cruz  PT Visit Information  Onset Date: 21  PT Insurance Information: Medical [employer]  Total # of Visits Approved: 24  Total # of Visits to Date: 15  Plan of Care/Certification Expiration Date: 21  Progress Note Due Date: 21  Subjective  Subjective: Patient states she played [employer] ball yesterday and says her leakage was about the same but her back and hip is feeling better today.   Pain Screening  Patient Currently in Pain: No  Vital Signs  Patient Currently in Pain: No       Treatment Activities:                                    Exercises  Exercise 1: supine legs up wall x 3 min- not today  Exercise 6: hs 90-90 neutral with er and ir 4 x 15 sec ea bilaterally, pririformis > 90 degrees 4 x 15 sec ea  Exercise 7: supine right hip psoas, quad, IT band stm x 8 min, with roller x 0 min, abdominal connective tissue mobilization with skin rolling and cross arm tech x 0 min, stm to bilateral thoracic paraspinal region  Exercise 8: supine right hip modified maribel stretch 4 x 15 sec  Exercise 9: sidelying right hip obers stretch 0 x 15 sec  Exercise 10: bilateral quadratus stretch 4 x 15 sec ea-not today  Exercise 12: sidelying right hip obers stretch 4 x 15 sec ea- not today  Exercise 14: sidelying on left pfm downtraining on biofeedback x 2 min, supine biofeedback pfm contraction alone in sidelying on left alone 10 sec x 6, with towel roll for hip add 10 sec x 7, sidelying on right 10 sec x 0 , supine hooklying alone 10 sec x 0, standing alone 10 sec x 5, seated alone 10 sec x 0,  with pillow for hip add 10 sec x 0, supine inclined position with pilow for hip add 10 sec x 10  Exercise 16: left rib 5, 7, 8 mwm with left shoulder extension and abduction isometric performed 5 sec x 5 ea- not today  Exercise 17: scapular retraction 1 x 10- not today                               Assessment:   Conditions Requiring Skilled Therapeutic Intervention  Body structures, Functions, Activity limitations: Decreased functional mobility ; Decreased ADL status; Decreased strength; Decreased endurance; Decreased coordination  Assessment: Neil did fair with tx today and had decreased muscular tension noted throughout left hip region. She had increased difficulty with pfm contractions today and more difficulty with relaxation in upright positions. She contracted at level 10-20 in sidelying with relaxation at 4-8, with more upright positions she relaxed at level 20-40 and contracted at elvel 30-60 throughout.   Treatment Diagnosis: muscle weakness, stress incontinence  REQUIRES PT FOLLOW UP: Yes  Treatment Initiated : ther-ex    Goals:  Short term goals  Time Frame for Short term goals: 6 Weeks  Short term goal 1: Patient to have PERF score 2+/10//15/20- PROGRESS (11/22/2021: PERF score 2/10/15/20 *able to perform 2+ with max v.c.)  Short term goal 2: Patient to have good lumbopelvic stabilization in hooklying alone- MET (11/22/2021: Patient has good lumboplevic stabilization in hooklying alone)  Short term goal 3: Patient to have report of decreased saturation of pad when playing pickle ball- NOT MET (11/22/2021: Patient states she still has a lot of saturation when she plays pickle ball)  Short term goal 4: Patient to be independent with HEP- MET (11/22/2021: Patient states is independent with HEP but she likes to do them standing)  Long term goals  Time Frame for Long term goals : 12 Weeks  Long term goal 1: Patient to have decreased urinary incontinence by 50%- PROGRESS (11/22/2021: Patient reports decreased urinary incontinence 15%, with slight decline since last visit)  Long term goal 2: Patient to report decreased urgency and frequency of urine by 50%- PROGRESS (11/22/2021: Patient repots decreased urgency and frequency by 20%)  Long term goal 3: Patient to have good lumbopelvic stabilizaiton in hooklying with ue/le activity- MET (11/22/2021: patient has good lumbopelvic stabilization in hooklying with alt ue/le activity)  Long term goal 4: Patient to demo increase function demo'ed by scoring <= 15% impairment on PFIQ-7 urine section- NOT MET (11/22/2021: Patient scored 47.61% impairmenton PFIQ-7 urine section)  Patient Goals   Patient goals : decrease urinary incontinence  Plan:    Plan  Times per week: 2x/week  Plan weeks: 12 weeks  Current Treatment Recommendations: Strengthening, ROM, Home Exercise Program, Safety Education & Training, Manual Therapy - Soft Tissue Mobilization, Neuromuscular Re-education, Manual Therapy - Joint Manipulation, Functional Mobility Training, Pain Management, Positioning, Modalities, Patient/Caregiver Education & Training  Timed Code Treatment Minutes: 47 Minutes     Therapy Time   Individual Concurrent Group Co-treatment   Time In 3512         Time Out 3154         Minutes 54         Timed Code Treatment Minutes: 54 Minutes  Electronically signed by Mauri Espinoza PT on 12/15/2021 at 12:04 PM no

## 2022-01-26 NOTE — PROGRESS NOTES
[facility] INTERNAL MEDICINE  [address] [address]  [address] [address]  Dept: [phone]  Dept Fax: [phone]  Loc: [phone]    Davi Neves (:  1965) is a 55 y.o. female,Established patient Alfred Santamaria, here for evaluation of the following chief complaint(s): Pharyngitis (was exposed on  (covid)), Chest Congestion (all symptoms started yesterday), Nasal Congestion, Chills, and Fatigue      Davi Neves is a 55 y.o. female who were are performing tele health communication  for her medical conditions/complaints as noted below. Currently, our state is under umbrella of national state of emergency and we are using alternative methods of taking care of the needs of our patients so they are not exposed in our office; The patient has consented to this form of communication  Davi Neves is c/bia   Pharyngitis (was exposed on  (covid)), Chest Congestion (all symptoms started yesterday), Nasal Congestion, Chills, and Fatigue    THE patient is at home   Florecitatayler Susie is at office   Others in attendance are none    HPI:     Chief Complaint   Patient presents with    Pharyngitis     was exposed on  (covid)    Chest Congestion     all symptoms started yesterday    Nasal Congestion    Chills    Fatigue     HPI   Several of her pickleball friends have tested positive for Covid. Last night she began feeling poorly with a low-grade fever body aches and fatigue. She has a pulse oximetry at home she is only short of breath whenever down stairs.     Past Medical History:   Diagnosis Date    Acid reflux     Anemia     BRCA negative     Chronic back pain     GERD (gastroesophageal reflux disease)     Hypothyroidism     Irritable bowel syndrome     Premature atrial contraction     Skipped heart beats     Thyroid disease     Thyroid mass     Vitamin D deficiency       Past Surgical History:   Procedure Laterality Date    BREAST ENHANCEMENT SURGERY      BREAST SURGERY      Augmentation,Biopsy+    CHOLECYSTECTOMY      COLONOSCOPY  ???    Praveen    COLONOSCOPY  2012        COLONOSCOPY N/A 2021    Dr Lloyd Cunha, 5 yr recall    HEMORRHOID SURGERY      THYROID SURGERY      Biopsy    THYROID SURGERY      nodule removed    TONSILLECTOMY      TUBAL LIGATION      UPPER GASTROINTESTINAL ENDOSCOPY  ? ?  One [employer]ure  2015        UPPER GASTROINTESTINAL ENDOSCOPY N/A 2019    Dr Natasha Rogers has non-erosive GERD with or without Esophageal dysmotility       Vitals 2021    SYSTOLIC [phone] 98 -   DIASTOLIC 66 80 62 56 58 -   Site Left Upper Arm - Left Upper Arm - - -   Position Sitting - Sitting - - -   Cuff Size - - - - - -   Pulse 76 77 81 82 81 -   Temp - - - - - -   Resp 18 - 18 - - -   SpO2 99 99 96 97 98 99   Weight 144 lb 12.8 oz 143 lb 3.2 oz 141 lb 3.2 oz - - -   Height 5' 7\" 5' 7\" 5' 7\" - - -   Body mass index 22.68 kg/m2 22.43 kg/m2 22.11 kg/m2 - - -   Some recent data might be hidden       Family History   Problem Relation Age of Onset    Other Mother         brain bleed    Dementia Mother     High Blood Pressure Mother     Lung Cancer Father          54, lung cancer    Cancer Father         Lung-Smoker    Breast Cancer Sister 50         at 47, did not wake up after a nap    Heart Attack Sister     Other Other         brain aneurysm    Colon Cancer Other     Cancer Maternal Grandfather         rectal cancer    Colon Cancer Maternal Grandfather     Colon Polyps Maternal Grandfather     Esophageal Cancer Neg Hx     Liver Cancer Neg Hx     Liver Disease Neg Hx     Rectal Cancer Neg Hx     Stomach Cancer Neg Hx        Social History     Tobacco Use    Smoking status: Never Smoker    Smokeless tobacco: Never Used   Substance Use Topics    Alcohol use:  Yes Comment: couple times per week      Current Outpatient Medications   Medication Sig Dispense Refill    sucralfate (CARAFATE) 1 GM/10ML suspension Take 10 mLs by mouth 4 times daily 1200 mL 3    Triamcinolone Acetonide (NASACORT ALLERGY 24HR NA) by Nasal route daily as needed      levothyroxine (SYNTHROID) 75 MCG tablet TAKE 1 TABLET BY MOUTH ONE TIME A DAY 90 tablet 3    tiZANidine (ZANAFLEX) 4 MG tablet Take 1 tablet by mouth every 6 hours as needed (muscle spasms) 60 tablet 0    vitamin D (ERGOCALCIFEROL) 1.25 MG (09115 UT) CAPS capsule TAKE 1 CAPSULE BY MOUTH ONCE A WEEK 12 capsule 3    lansoprazole (PREVACID) 30 MG delayed release capsule TAKE ONE CAPSULE TWO TIMES A  capsule 3    conjugated estrogens (PREMARIN) 0.625 MG/GM vaginal cream Place 0.5 g vaginally daily 1 Tube 3    loratadine (CLARITIN) 10 MG capsule Take 10 mg by mouth daily      albuterol sulfate  (90 Base) MCG/ACT inhaler Inhale 2 puffs into the lungs every 4 hours as needed (Patient not taking: Reported on 1/26/2022)      montelukast (SINGULAIR) 10 MG tablet Take 1 tablet by mouth nightly (Patient not taking: Reported on 12/1/2021) 30 tablet 5    Alum Hydroxide-Mag Carbonate (GAVISCON PO) Take by mouth nightly  (Patient not taking: Reported on 1/26/2022)       No current facility-administered medications for this visit.      Allergies   Allergen Reactions    Celebrex [Celecoxib] Anaphylaxis       Health Maintenance   Topic Date Due    Depression Screen  01/26/2022    DTaP/Tdap/Td vaccine (1 - Tdap) 12/01/2022 (Originally 10/2/1985)    Shingles Vaccine (1 of 2) 12/01/2022 (Originally 10/2/2016)    COVID-19 Vaccine (3 - Booster for Pfizer series) 03/08/2022    Breast cancer screen  04/26/2022    Cervical cancer screen  08/27/2024    Colon cancer screen colonoscopy  04/22/2026    Lipid screen  08/24/2026    Flu vaccine  Completed    Hepatitis A vaccine  Aged Out    Hepatitis B vaccine  Aged Out    Hib vaccine Aged Out    Meningococcal (ACWY) vaccine  Aged Out    Pneumococcal 0-64 years Vaccine  Aged Out    Hepatitis C screen  Discontinued    HIV screen  Discontinued       Lab Results   Component Value Date    LABA1C 4.7 02/02/2021     No results found for: PSA, PSADIA  TSH   Date Value Ref Range Status   12/01/2021 2.760 0.270 - 4.200 uIU/mL Final   ]  Lab Results   Component Value Date     12/01/2021    K 4.6 12/01/2021     12/01/2021    CO2 23 12/01/2021    BUN 13 12/01/2021    CREATININE 0.7 12/01/2021    GLUCOSE 86 12/01/2021    CALCIUM 9.4 12/01/2021    PROT 7.0 12/01/2021    LABALBU 4.4 12/01/2021    BILITOT 0.4 12/01/2021    ALKPHOS 76 12/01/2021    AST 20 12/01/2021    ALT 16 12/01/2021    LABGLOM >60 12/01/2021    GFRAA >59 12/01/2021     Lab Results   Component Value Date    CHOL 241 (H) 08/24/2021    CHOL 217 (H) 02/02/2021     Lab Results   Component Value Date    TRIG 71 08/24/2021    TRIG 63 02/02/2021     Lab Results   Component Value Date     08/24/2021     02/02/2021     Lab Results   Component Value Date    LDLCALC 107 08/24/2021    LDLCALC 92 02/02/2021     Lab Results   Component Value Date     12/01/2021    K 4.6 12/01/2021     12/01/2021    CO2 23 12/01/2021    BUN 13 12/01/2021    CREATININE 0.7 12/01/2021    GLUCOSE 86 12/01/2021    CALCIUM 9.4 12/01/2021      Lab Results   Component Value Date    WBC 4.6 (L) 12/01/2021    HGB 15.2 12/01/2021    HCT 47.8 (H) 12/01/2021    MCV 90.9 12/01/2021     12/01/2021    LABLYMP 1.36 02/22/2013    LYMPHOPCT 25.9 12/01/2021    RBC 5.26 12/01/2021    MCH 28.9 12/01/2021    MCHC 31.8 (L) 12/01/2021    RDW 12.8 12/01/2021     Lab Results   Component Value Date    VITD25 34.4 08/24/2021       Subjective:      Review of Systems   Constitutional: Positive for fatigue and fever. Negative for unexpected weight change. HENT: Positive for rhinorrhea.  Negative for ear discharge, ear pain, mouth sores, sore throat and trouble swallowing. Eyes: Negative for discharge, itching and visual disturbance. Respiratory: Positive for cough. Negative for choking, shortness of breath, wheezing and stridor. Cardiovascular: Negative for chest pain, palpitations and leg swelling. Gastrointestinal: Negative for abdominal distention, abdominal pain, blood in stool, constipation, diarrhea, nausea and vomiting. Endocrine: Negative for cold intolerance, polydipsia and polyuria. Genitourinary: Negative for difficulty urinating, dysuria, frequency and urgency. Musculoskeletal: Negative for arthralgias and gait problem. Skin: Negative for color change and rash. Allergic/Immunologic: Negative for food allergies and immunocompromised state. Neurological: Negative for dizziness, tremors, syncope, speech difficulty, weakness and headaches. Hematological: Negative for adenopathy. Does not bruise/bleed easily. Psychiatric/Behavioral: Negative for confusion and hallucinations. Objective:     Physical Exam   Constitutional  well developed, well nourished. No diaphoresis or acute distress. Neck- ROM appears normal  Extremities -No cyanosis, no edema  Pulmonary  effort appears normal.  No respiratory distress. No accessory muscle use  Neurologic  alert and oriented X 3. Cranial Nerves II-XII grossly intact  Skin color is good;  no sign of dehydration   Psychiatric  mood, affect, and behavior appear normal.  Judgment and thought processes appear normal.     There were no vitals taken for this visit. Assessment:      Problem List     Close exposure to COVID-19 virus     Covid test in the morning and then sent meds after that          Other fatigue     Covid swab tomorrow                Plan:        Patient given educational materials - see patient instructions. Discussed use, benefit, and side effects of prescribed medications. Allpatient questions answered. Pt voiced understanding. Reviewed health maintenance. Instructed to continue current medications, diet and exercise. Patient agreed with treatment plan. Follow up as directed. MEDICATIONS:  No orders of the defined types were placed in this encounter. ORDERS:  No orders of the defined types were placed in this encounter. Follow-up:  No follow-ups on file. Following the remote visit today we will call you when we are available to reschedule your follow up appt      PATIENT INSTRUCTIONS:  Patient Instructions   1. Covid exposure  She will come in the morning and get Covid testing to give us a few more hours if she is going to develop symptoms. The plan is to send her Medrol Dosepak start Mucinex she already has CD and zinc at home. Electronically signed by VERA Darling on 1/26/2022 at 3:07 PM   doxy visit 15 minutes  We are communicating with telehealth for the 3 month fu for controlled substance      Pursuant to the emergency declaration under the [address] [place] [address], [address] [place] [address] authority and the [employer] and [employer] Act, this Virtual Visit was conducted, with patient's consent, to reduce the patient's risk of exposure to COVID-19 and provide continuity of care for an established patient. EMRDragon/transcription disclaimer:  Much of this encounter note is electronic    Monse Wilkinsly is a 55 y.o. female being evaluated by a Virtual Visit (video visit) encounter to address concerns as mentioned above. A caregiver was present when appropriate. Due to this being a TeleHealth encounter ([facility]), evaluation of the following organ systems was limited: Vitals/Constitutional/EENT/Resp/CV/GI//MS/Neuro/Skin/Heme-Lymph-Imm.   Pursuant to the emergency declaration under the [address] [place] [address], [address] [place] [address] waiver authority and the [employer] and [employer] Act, this Virtual Visit was conducted with patient's (and/or legal guardian's) consent, to reduce the patient's risk of exposure to COVID-19 and provide necessary medical care. The patient (and/or legal guardian) has also been advised to contact this office for worsening conditions or problems, and seek emergency medical treatment and/or call 911 if deemed necessary.      Patient identification was verified at the start of the visit: Yes

## 2022-01-26 NOTE — PATIENT INSTRUCTIONS
1.   Covid exposure  She will come in the morning and get Covid testing to give us a few more hours if she is going to develop symptoms. The plan is to send her Medrol Dosepak start Mucinex she already has CD and zinc at home.

## 2022-01-27 ENCOUNTER — OFFICE VISIT (OUTPATIENT)
Dept: INTERNAL MEDICINE | Age: 57
End: 2022-01-27
Payer: COMMERCIAL

## 2022-01-27 ENCOUNTER — HOSPITAL ENCOUNTER (OUTPATIENT)
Dept: GENERAL RADIOLOGY | Age: 57
Discharge: HOME | End: 2022-01-27
Payer: COMMERCIAL

## 2022-01-27 VITALS
TEMPERATURE: 98.8 F | OXYGEN SATURATION: 98 % | DIASTOLIC BLOOD PRESSURE: 70 MMHG | SYSTOLIC BLOOD PRESSURE: 138 MMHG | HEART RATE: 90 BPM

## 2022-01-27 DIAGNOSIS — R05.9 COUGH: ICD-10-CM

## 2022-01-27 DIAGNOSIS — R05.9 COUGH: Primary | ICD-10-CM

## 2022-01-27 DIAGNOSIS — Z20.822 CLOSE EXPOSURE TO COVID-19 VIRUS: ICD-10-CM

## 2022-01-27 LAB
Lab: NORMAL
QC PASS/FAIL: NORMAL
SARS-COV-2 RDRP RESP QL NAA+PROBE: NEGATIVE

## 2022-01-27 PROCEDURE — 96372 THER/PROPH/DIAG INJ SC/IM: CPT | Performed by: NURSE PRACTITIONER

## 2022-01-27 PROCEDURE — 99213 OFFICE O/P EST LOW 20 MIN: CPT | Performed by: NURSE PRACTITIONER

## 2022-01-27 PROCEDURE — 71046 X-RAY EXAM CHEST 2 VIEWS: CPT

## 2022-01-27 PROCEDURE — 87635 SARS-COV-2 COVID-19 AMP PRB: CPT | Performed by: NURSE PRACTITIONER

## 2022-01-27 RX ORDER — METHYLPREDNISOLONE ACETATE 40 MG/ML
40 INJECTION, SUSPENSION INTRA-ARTICULAR; INTRALESIONAL; INTRAMUSCULAR; SOFT TISSUE ONCE
Status: COMPLETED | OUTPATIENT
Start: 2022-01-27 | End: 2022-01-27

## 2022-01-27 RX ORDER — CEFDINIR 300 MG/1
300 CAPSULE ORAL 2 TIMES DAILY
Qty: 14 CAPSULE | Refills: 0 | Status: SHIPPED | OUTPATIENT
Start: 2022-01-27 | End: 2022-02-03

## 2022-01-27 RX ADMIN — METHYLPREDNISOLONE ACETATE 40 MG: 40 INJECTION, SUSPENSION INTRA-ARTICULAR; INTRALESIONAL; INTRAMUSCULAR; SOFT TISSUE at 09:53

## 2022-01-27 ASSESSMENT — ENCOUNTER SYMPTOMS
EYE DISCHARGE: 0
SORE THROAT: 0
ABDOMINAL PAIN: 0
CHOKING: 0
VOMITING: 0
STRIDOR: 0
BLOOD IN STOOL: 0
EYE ITCHING: 0
CONSTIPATION: 0
SHORTNESS OF BREATH: 0
SINUS PRESSURE: 1
COLOR CHANGE: 0
NAUSEA: 0
DIARRHEA: 0
ABDOMINAL DISTENTION: 0
TROUBLE SWALLOWING: 0
COUGH: 1
WHEEZING: 0

## 2022-01-27 NOTE — PROGRESS NOTES
Formerly [facility] PHYSICIAN SERVICES  [facility] INTERNAL MEDICINE  [address] [address] [address]  Dept: [phone]  Dept Fax: [phone]: [phone]    Polina Quarles (:  1965) is a 55 y.o. female,Established patient  with jasmyne, here for evaluation of the following chief complaint(s): Other (head congestion)      Polina Quarles is a 55 y.o. female who presents today for her medical conditions/complaints as noted below. Polina Quarles is c/bia Other (head congestion)        HPI:     Chief Complaint   Patient presents with    Other     head congestion     HPI   #1 head congestion  2. Cough  3. Chest pain    We did talk to Blayne yesterday regarding concern for Covid several of her friends to play pickle ball and test positive she now has sinus congestion and drainage. Yesterday we asked her to start Mucinex this then to see the vitamin D. She was going to come get a Covid test and it is negative. But during the night she said that she awoke about 5 this morning with her chest is feeling full like her chest was going to explode. She has had some fatigue but no fever or body aches. Past Medical History:   Diagnosis Date    Acid reflux     Anemia     BRCA negative     Chronic back pain     GERD (gastroesophageal reflux disease)     Hypothyroidism     Irritable bowel syndrome     Premature atrial contraction     Skipped heart beats     Thyroid disease     Thyroid mass     Vitamin D deficiency       Past Surgical History:   Procedure Laterality Date    BREAST ENHANCEMENT SURGERY      BREAST SURGERY      Augmentation,Biopsy+    CHOLECYSTECTOMY      COLONOSCOPY  ???    [facility]    COLONOSCOPY  2012        COLONOSCOPY N/A 2021    Dr Valeria Gilbert, 5 yr recall    HEMORRHOID SURGERY      THYROID SURGERY      Biopsy    THYROID SURGERY      nodule removed    TONSILLECTOMY      TUBAL LIGATION      UPPER GASTROINTESTINAL ENDOSCOPY  ? ?      [facility]veen    UPPER GASTROINTESTINAL ENDOSCOPY  2015        UPPER GASTROINTESTINAL ENDOSCOPY N/A 2019    Dr Boris Gayle has non-erosive GERD with or without Esophageal dysmotility       Vitals 2021 350/2645   SYSTOLIC [phone] 320 98   DIASTOLIC 70 66 80 62 56 58   Site - Left Upper Arm - Left Upper Arm - -   Position - Sitting - Sitting - -   Cuff Size - - - - - -   Pulse 90 76 77 81 82 81   Temp 98.8 - - - - -   Resp - 18 - 18 - -   SpO2 98 99 99 96 97 98   Weight - 144 lb 12.8 oz 143 lb 3.2 oz 141 lb 3.2 oz - -   Height - 5' 7\" 5' 7\" 5' 7\" - -   Body mass index - 22.68 kg/m2 22.43 kg/m2 22.11 kg/m2 - -   Some recent data might be hidden       Family History   Problem Relation Age of Onset    Other Mother         brain bleed    Dementia Mother     High Blood Pressure Mother     Lung Cancer Father          54, lung cancer    Cancer Father         Lung-Smoker    Breast Cancer Sister 50         at 47, did not wake up after a nap    Heart Attack Sister     Other Other         brain aneurysm    Colon Cancer Other     Cancer Maternal Grandfather         rectal cancer    Colon Cancer Maternal Grandfather     Colon Polyps Maternal Grandfather     Esophageal Cancer Neg Hx     Liver Cancer Neg Hx     Liver Disease Neg Hx     Rectal Cancer Neg Hx     Stomach Cancer Neg Hx        Social History     Tobacco Use    Smoking status: Never Smoker    Smokeless tobacco: Never Used   Substance Use Topics    Alcohol use: Yes     Comment: couple times per week      Current Outpatient Medications   Medication Sig Dispense Refill    cefdinir (OMNICEF) 300 MG capsule Take 1 capsule by mouth 2 times daily for 7 days 14 capsule 0    sucralfate (CARAFATE) 1 GM/10ML suspension Take 10 mLs by mouth 4 times daily 1200 mL 3    albuterol sulfate  (90 Base) MCG/ACT inhaler Inhale 2 puffs into the lungs every 4 hours as needed (Patient not taking: Reported on 1/26/2022)      Triamcinolone Acetonide (NASACORT ALLERGY 24HR NA) by Nasal route daily as needed      montelukast (SINGULAIR) 10 MG tablet Take 1 tablet by mouth nightly (Patient not taking: Reported on 12/1/2021) 30 tablet 5    levothyroxine (SYNTHROID) 75 MCG tablet TAKE 1 TABLET BY MOUTH ONE TIME A DAY 90 tablet 3    tiZANidine (ZANAFLEX) 4 MG tablet Take 1 tablet by mouth every 6 hours as needed (muscle spasms) 60 tablet 0    vitamin D (ERGOCALCIFEROL) 1.25 MG (37527 UT) CAPS capsule TAKE 1 CAPSULE BY MOUTH ONCE A WEEK 12 capsule 3    lansoprazole (PREVACID) 30 MG delayed release capsule TAKE ONE CAPSULE TWO TIMES A  capsule 3    conjugated estrogens (PREMARIN) 0.625 MG/GM vaginal cream Place 0.5 g vaginally daily 1 Tube 3    loratadine (CLARITIN) 10 MG capsule Take 10 mg by mouth daily      Alum Hydroxide-Mag Carbonate (GAVISCON PO) Take by mouth nightly  (Patient not taking: Reported on 1/26/2022)       Current Facility-Administered Medications   Medication Dose Route Frequency Provider Last Rate Last Admin    methylPREDNISolone acetate (DEPO-MEDROL) injection 40 mg  40 mg IntraMUSCular Once VERA Lemons         Allergies   Allergen Reactions    Celebrex [Celecoxib] Anaphylaxis       Health Maintenance   Topic Date Due    Depression Screen  01/26/2022    DTaP/Tdap/Td vaccine (1 - Tdap) 12/01/2022 (Originally 10/2/1985)    Shingles Vaccine (1 of 2) 12/01/2022 (Originally 10/2/2016)    COVID-19 Vaccine (3 - Booster for Pfizer series) 03/08/2022    Breast cancer screen  04/26/2022    Cervical cancer screen  08/27/2024    Colon cancer screen colonoscopy  04/22/2026    Lipid screen  08/24/2026    Flu vaccine  Completed    Hepatitis A vaccine  Aged Out    Hepatitis B vaccine  Aged Out    Hib vaccine  Aged Out    Meningococcal (ACWY) vaccine  Aged Out    Pneumococcal 0-64 years Vaccine  Aged Out    Hepatitis C screen  Discontinued    HIV screen  Discontinued       Lab Results   Component Value Date    LABA1C 4.7 02/02/2021     No results found for: PSA, PSADIA  TSH   Date Value Ref Range Status   12/01/2021 2.760 0.270 - 4.200 uIU/mL Final   ]  Lab Results   Component Value Date     12/01/2021    K 4.6 12/01/2021     12/01/2021    CO2 23 12/01/2021    BUN 13 12/01/2021    CREATININE 0.7 12/01/2021    GLUCOSE 86 12/01/2021    CALCIUM 9.4 12/01/2021    PROT 7.0 12/01/2021    LABALBU 4.4 12/01/2021    BILITOT 0.4 12/01/2021    ALKPHOS 76 12/01/2021    AST 20 12/01/2021    ALT 16 12/01/2021    LABGLOM >60 12/01/2021    GFRAA >59 12/01/2021     Lab Results   Component Value Date    CHOL 241 (H) 08/24/2021    CHOL 217 (H) 02/02/2021     Lab Results   Component Value Date    TRIG 71 08/24/2021    TRIG 63 02/02/2021     Lab Results   Component Value Date     08/24/2021     02/02/2021     Lab Results   Component Value Date    LDLCALC 107 08/24/2021    LDLCALC 92 02/02/2021     Lab Results   Component Value Date     12/01/2021    K 4.6 12/01/2021     12/01/2021    CO2 23 12/01/2021    BUN 13 12/01/2021    CREATININE 0.7 12/01/2021    GLUCOSE 86 12/01/2021    CALCIUM 9.4 12/01/2021      Lab Results   Component Value Date    WBC 4.6 (L) 12/01/2021    HGB 15.2 12/01/2021    HCT 47.8 (H) 12/01/2021    MCV 90.9 12/01/2021     12/01/2021    LABLYMP 1.36 02/22/2013    LYMPHOPCT 25.9 12/01/2021    RBC 5.26 12/01/2021    MCH 28.9 12/01/2021    MCHC 31.8 (L) 12/01/2021    RDW 12.8 12/01/2021     Lab Results   Component Value Date    VITD25 34.4 08/24/2021     Labs reviewed from today  covid   Diagnostics reviewed from today  cxr    Subjective:      Review of Systems   Constitutional: Positive for fatigue. Negative for fever and unexpected weight change. HENT: Positive for congestion and sinus pressure. Negative for ear discharge, ear pain, mouth sores, sore throat and trouble swallowing.     Eyes: Negative for discharge, itching and visual disturbance. Respiratory: Positive for cough. Negative for choking, shortness of breath, wheezing and stridor. Cardiovascular: Positive for chest pain. Negative for palpitations and leg swelling. Gastrointestinal: Negative for abdominal distention, abdominal pain, blood in stool, constipation, diarrhea, nausea and vomiting. Endocrine: Negative for cold intolerance, polydipsia and polyuria. Genitourinary: Negative for difficulty urinating, dysuria, frequency and urgency. Musculoskeletal: Negative for arthralgias and gait problem. Skin: Negative for color change and rash. Allergic/Immunologic: Negative for food allergies and immunocompromised state. Neurological: Negative for dizziness, tremors, syncope, speech difficulty, weakness and headaches. Hematological: Negative for adenopathy. Does not bruise/bleed easily. Psychiatric/Behavioral: Negative for confusion and hallucinations. Objective:     Physical Exam  Constitutional:       General: She is not in acute distress. Appearance: She is well-developed. HENT:      Head: Normocephalic and atraumatic. Right Ear: Tympanic membrane normal.      Left Ear: Tympanic membrane normal.      Nose: Nose normal.      Mouth/Throat:      Mouth: Mucous membranes are moist.      Pharynx: Oropharynx is clear. Eyes:      General: No scleral icterus. Right eye: No discharge. Left eye: No discharge. Pupils: Pupils are equal, round, and reactive to light. Neck:      Thyroid: No thyromegaly. Vascular: No JVD. Cardiovascular:      Rate and Rhythm: Normal rate and regular rhythm. Heart sounds: Normal heart sounds. No murmur heard. Pulmonary:      Effort: Pulmonary effort is normal. No respiratory distress. Breath sounds: Normal breath sounds. No wheezing or rales. Abdominal:      General: Bowel sounds are normal. There is no distension. Palpations: Abdomen is soft.  There is no mass. Tenderness: There is no abdominal tenderness. There is no guarding or rebound. Musculoskeletal:         General: No tenderness. Normal range of motion. Cervical back: Normal range of motion and neck supple. Skin:     General: Skin is warm and dry. Findings: No erythema or rash. Neurological:      Mental Status: She is alert and oriented to person, place, and time. Cranial Nerves: No cranial nerve deficit. Coordination: Coordination normal.      Deep Tendon Reflexes: Reflexes are normal and symmetric. Reflexes normal.   Psychiatric:         Mood and Affect: Mood is not depressed. Behavior: Behavior normal.         Thought Content: Thought content normal.         Judgment: Judgment normal.       /70   Pulse 90   Temp 98.8 °F (37.1 °C)   SpO2 98%           Assessment:      Problem List     Close exposure to COVID-19 virus     Covid testing today was negative          Cough - Primary     Chest x-ray today          Relevant Orders    XR CHEST (2 VW)          Plan:        Patient given educational materials - see patient instructions. Discussed use, benefit, and side effects of prescribed medications. Allpatient questions answered. Pt voiced understanding. Reviewed health maintenance. Instructed to continue current medications, diet and exercise. Patient agreed with treatment plan. Follow up as directed. MEDICATIONS:  Orders Placed This Encounter   Medications    methylPREDNISolone acetate (DEPO-MEDROL) injection 40 mg    cefdinir (OMNICEF) 300 MG capsule     Sig: Take 1 capsule by mouth 2 times daily for 7 days     Dispense:  14 capsule     Refill:  0         ORDERS:  Orders Placed This Encounter   Procedures    XR CHEST (2 VW)       Follow-up:  Return for keep fu appt. PATIENT INSTRUCTIONS:  Patient Instructions   1. Negative for Covid  2. Bronchitis  3.      Medrol 40 im  Cefdinir 300 twice  a day for 7 days  get 2 doses in today    Saline nasal spray 4 times a day both nares for 7 days  Steroid spray, rhinocort, nasocort, nasonex, flonase twice daily about 5 minutes after the saline   mucinex 1200 mg twice daily for 7 days with plenty of water  Delsym cough syrup up to 3 times daily as needed for cough   Ricola cough drops, honey lemon in pink bag;  has echanesia to help build your immunity   . zinc  Vitamin C  Vitamin D        Electronically signed by VERA Florence on 1/27/2022 at 9:44 AM    @On this date 1/27/2022 I have spent 15 minutes reviewing previous notes, test results and face to face with the patient discussing the diagnosis and importance of compliance with the treatment plan as well as documenting on the day of the visit. EMRDragon/transcription disclaimer:  Much of this encounter note is electronic transcription/translation of spoken language to printed texts. The electronic translation of spoken language may be erroneous, or at times,nonsensical words or phrases may be inadvertently transcribed.   Although I have reviewed the note for such errors, some may still exist.

## 2022-01-27 NOTE — PATIENT INSTRUCTIONS
1.  Negative for Covid  2. Bronchitis  3. Medrol 40 im  Cefdinir 300 twice  a day for 7 days  get 2 doses in today    Saline nasal spray 4 times a day both nares for 7 days  Steroid spray, rhinocort, nasocort, nasonex, flonase twice daily about 5 minutes after the saline   mucinex 1200 mg twice daily for 7 days with plenty of water  Delsym cough syrup up to 3 times daily as needed for cough   Ricola cough drops, honey lemon in pink bag;  has echanesia to help build your immunity   . zinc  Vitamin C  Vitamin D

## 2022-01-31 ENCOUNTER — NURSE ONLY (OUTPATIENT)
Dept: INTERNAL MEDICINE | Age: 57
End: 2022-01-31

## 2022-01-31 ENCOUNTER — HOSPITAL ENCOUNTER (OUTPATIENT)
Dept: PHYSICAL THERAPY | Age: 57
Setting detail: THERAPIES SERIES
End: 2022-01-31
Payer: COMMERCIAL

## 2022-01-31 DIAGNOSIS — R05.9 COUGH: ICD-10-CM

## 2022-01-31 DIAGNOSIS — R05.9 COUGH: Primary | ICD-10-CM

## 2022-01-31 LAB — SARS-COV-2, PCR: DETECTED

## 2022-02-01 RX ORDER — BENZONATATE 200 MG/1
200 CAPSULE ORAL 3 TIMES DAILY PRN
Qty: 30 CAPSULE | Refills: 0 | Status: SHIPPED | OUTPATIENT
Start: 2022-02-01 | End: 2022-02-08

## 2022-02-02 ENCOUNTER — APPOINTMENT (OUTPATIENT)
Dept: PHYSICAL THERAPY | Age: 57
End: 2022-02-02
Payer: COMMERCIAL

## 2022-02-07 ENCOUNTER — HOSPITAL ENCOUNTER (OUTPATIENT)
Dept: PHYSICAL THERAPY | Age: 57
Setting detail: THERAPIES SERIES
Discharge: HOME | End: 2022-02-07
Payer: COMMERCIAL

## 2022-02-07 PROCEDURE — 97110 THERAPEUTIC EXERCISES: CPT

## 2022-02-07 PROCEDURE — 90912 BFB TRAINING 1ST 15 MIN: CPT

## 2022-02-07 PROCEDURE — 97140 MANUAL THERAPY 1/> REGIONS: CPT

## 2022-02-07 NOTE — PROGRESS NOTES
Physical Therapy  Daily Treatment Note  Date: 2022  Patient Name: Emelia Holloway  MRN: [id number]     :   1965    Subjective:   General  Chart Reviewed: Yes  Response To Previous Treatment: Not applicable  Family / Caregiver Present: No  Referring Practitioner: Alexandru Crawley  PT Visit Information  Onset Date: 22  PT Insurance Information: BC[employer]  Total # of Visits Approved: 24  Total # of Visits to Date: 4  Plan of Care/Certification Expiration Date: 22  Progress Note Due Date: 02/10/22  Subjective  Subjective: Patient states she has recovered from covid and is doing ok. She says her back and hip are doing ok.   Pain Screening  Patient Currently in Pain: Yes  Vital Signs  Patient Currently in Pain: Yes       Treatment Activities:                                    Exercises  Exercise 1: supine legs up wall x 3 min- not today  Exercise 2: supine ta contraction alone 10 sec x 10, alt ue 1 x 10, alt le 1 x 10, alt ue/le 1 x 10- not today  Exercise 3: supine knee push isometric into hip ext on left 5 sec x5, into hip flex on right 5 sec x 5 (x 2)- not today  Exercise 4: supine pelvic tilts 1 x 10- not today  Exercise 5: supine bilateral lumbar rotation 1 x 10, bilateral quadratus stretch 4 x 15 sec ea  Exercise 6: hs 90-90 neutral with er and ir 4 x 15 sec ea bilaterally, pririformis > 90 degrees 4 x 15 sec ea- before and after IASTM  Exercise 7: supine right hip psoas, quad, IT band, hip add stm x 10 min, with roller x 0 min,  stm to bilateral psoas thoracic/lumbarright  paraspinal, gluteals, piriforms region x 10 min- with IASTM today  Exercise 8: supine right hip modified maribel stretch 4 x 15 sec  Exercise 9: sidelying right hip obers stretch leg bent and straight 4 x 15 sec ea- before and after IASTM  Exercise 10: prayer stretch neutral, left and right 4 x 15 sec ea, seated forward bend neutral, left, right 4 x 15 sec- not today  Exercise 11: prone SI overpressure 5 sec x 5- not today  Exercise 12: left L3, 4 mwm with right thoracic rotation 5 sec x 5- not today  Exercise 13: 3 min walk prior to IASTM  Exercise 14: sidelying on left pfm downtraining on biofeedback x 1 min,  biofeedback pfm contraction alone in sidelying on left alone 10 sec x 10, with towel roll for hip add 10 sec x 10, sidelying on right 10 sec x 0 , supine hooklying alone 10 sec x 5, supine legs extended 10 sec x 0, standing alone 10 sec x 0, seated alone 10 sec x 0,  with pillow for hip add 10 sec x 0, supine inclined position with pilow for hip add 10 sec x 0- not today  Exercise 15: stm internally with sweeping and trigger point release to right obturator internus and levator ani x 8 min- not today  Exercise 18: PERF score 2/10/15/20                               Assessment:   Conditions Requiring Skilled Therapeutic Intervention  Body structures, Functions, Activity limitations: Decreased functional mobility ; Decreased ADL status; Decreased strength;Decreased endurance;Decreased coordination  Assessment: Patient did well with tx today with decreased pfm tension noted on biofeedback with level 3-6 throughout session and contraction at level 8-20 throughout session. She did have increased consistency with accessory muscle use noted. She tolerated session well with IASTM today and had notable tension but decreased after stm.   Treatment Diagnosis: muscle weakness, stress incontinence  REQUIRES PT FOLLOW UP: Yes (after new eval due to new insurance)    Goals:  Short term goals  Time Frame for Short term goals: 6 Weeks  Short term goal 1: Patient to have PERF score 2+/10//15/20  Short term goal 2: Patient to have min to no tenderness throughout right obturator internus and levator ani internally  Short term goal 3: Patient to have report of decreased saturation of pad when playing pickle ball  Short term goal 4: Patient to be independent with HEP  Long term goals  Time Frame for Long term goals : 12 Weeks  Long term goal

## 2022-02-09 ENCOUNTER — HOSPITAL ENCOUNTER (OUTPATIENT)
Dept: PHYSICAL THERAPY | Age: 57
Setting detail: THERAPIES SERIES
Discharge: HOME | End: 2022-02-09
Payer: COMMERCIAL

## 2022-02-09 PROCEDURE — 97110 THERAPEUTIC EXERCISES: CPT

## 2022-02-09 NOTE — PROGRESS NOTES
3/10/15/20)  Short term goal 2: Patient to have min to no tenderness throughout right obturator internus and levator ani internally- MET (02/09/2022:  Patient has min to no tenderness throughout right obturator internus and levator ani)  Short term goal 3: Patient to have report of decreased saturation of pad when playing pickle ball- NOT MET (02/09/2022: Patient states she has noticed her saturation of her pad based on the amount of fluid intakes, but regularly has saturation)  Short term goal 4: Patient to be independent with HEP- MET (02/09/2022: Patient states she is performing her HEP)  Long term goals  Time Frame for Long term goals : 12 Weeks  Long term goal 1: Patient to have decreased urinary incontinence by 50%- PROGRESS (02/09/2022: reports her urinary incontinence is about 25% improved)  Long term goal 2: Patient to report decreased urgency and frequency of urine by 50%- MET (02/09/2022: Patient states her urgency and frequency of urine by 40%)  Long term goal 3: Patient to have min to no tenderness throughout right lateral and medial hip, IT band, and paraspinals of lumbar- PROGRESS (02/09/2022: Patient has min to mod tendernes throughout right lateral and medial hip, IT band, and paraspinals of lumbar)  Long term goal 4: Patient to demo increase function demo'ed by scoring <= 15% impairment on PFIQ-7 urine section- PROGRESS (02/09/2022: Patient scored % impairment on PFIQ)  Patient Goals   Patient goals : decrease urinary incontinence  Plan:    Plan  Plan Comment: D/C with independent HEP  Timed Code Treatment Minutes: 52 Minutes     Therapy Time   Individual Concurrent Group Co-treatment   Time In 1008         Time Out 1100         Minutes 52         Timed Code Treatment Minutes: 52 Minutes  Electronically signed by Juan Ramon Valero PT on 2/9/2022 at 11:26 AM

## 2022-02-14 ENCOUNTER — APPOINTMENT (OUTPATIENT)
Dept: PHYSICAL THERAPY | Age: 57
End: 2022-02-14
Payer: COMMERCIAL

## 2022-02-16 ENCOUNTER — APPOINTMENT (OUTPATIENT)
Dept: PHYSICAL THERAPY | Age: 57
End: 2022-02-16
Payer: COMMERCIAL

## 2022-02-21 ENCOUNTER — APPOINTMENT (OUTPATIENT)
Dept: PHYSICAL THERAPY | Age: 57
End: 2022-02-21
Payer: COMMERCIAL

## 2022-02-23 ENCOUNTER — APPOINTMENT (OUTPATIENT)
Dept: PHYSICAL THERAPY | Age: 57
End: 2022-02-23
Payer: COMMERCIAL

## 2022-02-23 NOTE — PROGRESS NOTES
[facility]  OUTPATIENT PHYSICAL THERAPY  DISCHARGE SUMMARY    Date: 2022  Patient Name: Polina Quarles        MRN: [id number]    ACCOUNT #: [de-identified]  : 1965  (55 y.o.)  Gender: female   Referring Practitioner: Rosa Buckley  Diagnosis: Mixed stress and urge urinary incontinence N39.46  Referral Date : 21  Treatment Diagnosis: muscle weakness, stress incontinence    Total # of Visits Approved: 24  Total # of Visits to Date: 5    Subjective  Subjective: Patient states she can tell that the muscles are working better in her pelvic floor, but she does still have incontinence with playing pickel ball. She says that she can tell a big difference in her urgency/frequency with urination and that her hip and back pain has been more manageable. Objective  Treatments received include: ther-ex, manual, biofeedback   See objective/subjective data in goals    Assessment  Assessment: Patient did well with tx today with decreased tension noted, increased pfm strength and activity tolerance and decreased report of urinary urgency/frequency and incontinence. She is independent with HEP and is agreeable with making today her last day and continuing with ex's at home.            Plan  D/C with independent HEP          Goals  Short term goals  Time Frame for Short term goals: 6 Weeks  Short term goal 1: Patient to have PERF score 2+/10//15/20- MET (2022: Patient PERF score 3/10/15/20)  Short term goal 2: Patient to have min to no tenderness throughout right obturator internus and levator ani internally- MET (2022:  Patient has min to no tenderness throughout right obturator internus and levator ani)  Short term goal 3: Patient to have report of decreased saturation of pad when playing pickle ball- NOT MET (2022: Patient states she has noticed her saturation of her pad based on the amount of fluid intakes, but regularly has saturation)  Short term goal 4: Patient to be independent with HEP- MET (02/09/2022: Patient states she is performing her HEP)    Long term goals  Time Frame for Long term goals : 12 Weeks  Long term goal 1: Patient to have decreased urinary incontinence by 50%- PROGRESS (02/09/2022: reports her urinary incontinence is about 25% improved)  Long term goal 2: Patient to report decreased urgency and frequency of urine by 50%- MET (02/09/2022: Patient states her urgency and frequency of urine by 40%)  Long term goal 3: Patient to have min to no tenderness throughout right lateral and medial hip, IT band, and paraspinals of lumbar- PROGRESS (02/09/2022: Patient has min to mod tendernes throughout right lateral and medial hip, IT band, and paraspinals of lumbar)  Long term goal 4: Patient to demo increase function demo'ed by scoring <= 15% impairment on PFIQ-7 urine section- PROGRESS (02/09/2022: Patient scored % impairment on PFIQ)         Electronically signed by Carla Junior PT on 2/23/2022 at 11:15 AM

## 2022-02-26 PROBLEM — R05.9 COUGH: Status: RESOLVED | Noted: 2022-01-27 | Resolved: 2022-02-26

## 2022-02-28 ENCOUNTER — APPOINTMENT (OUTPATIENT)
Dept: PHYSICAL THERAPY | Age: 57
End: 2022-02-28
Payer: COMMERCIAL

## 2022-03-14 ENCOUNTER — TELEPHONE (OUTPATIENT)
Dept: GASTROENTEROLOGY | Age: 57
End: 2022-03-14

## 2022-03-14 NOTE — TELEPHONE ENCOUNTER
Carlotta Scott called requesting an appointment. Last seen 2019 with   Dr Leann Bloom. She is having concern with GERD symptoms and would like to discuss if another scope is needed. Please return her call to let her know if new referral needed or to schedule @ [phone]      Thank you.

## 2022-03-14 NOTE — TELEPHONE ENCOUNTER
Since she is established, no referral is needed. I will call pt to schedule [facility]

## 2022-03-21 ENCOUNTER — TELEPHONE (OUTPATIENT)
Dept: INTERNAL MEDICINE | Age: 57
End: 2022-03-21

## 2022-03-21 NOTE — TELEPHONE ENCOUNTER
Pt cut herself on a suleiman pair of dog clippers and needs a TDAP. Scheduled with Dr. Guerline Leroy. Pt agrees to this.

## 2022-03-24 ENCOUNTER — OFFICE VISIT (OUTPATIENT)
Dept: GASTROENTEROLOGY | Age: 57
End: 2022-03-24
Payer: COMMERCIAL

## 2022-03-24 VITALS
OXYGEN SATURATION: 98 % | WEIGHT: 141 LBS | HEIGHT: 67 IN | SYSTOLIC BLOOD PRESSURE: 120 MMHG | DIASTOLIC BLOOD PRESSURE: 80 MMHG | BODY MASS INDEX: 22.13 KG/M2 | HEART RATE: 81 BPM

## 2022-03-24 DIAGNOSIS — R12 CHRONIC HEARTBURN: Primary | ICD-10-CM

## 2022-03-24 DIAGNOSIS — K22.89 ESOPHAGEAL PAIN: ICD-10-CM

## 2022-03-24 DIAGNOSIS — R11.0 NAUSEA: ICD-10-CM

## 2022-03-24 DIAGNOSIS — K30 INDIGESTION: ICD-10-CM

## 2022-03-24 DIAGNOSIS — Z11.52 ENCOUNTER FOR SCREENING FOR COVID-19: Primary | ICD-10-CM

## 2022-03-24 DIAGNOSIS — R10.13 DYSPEPSIA: ICD-10-CM

## 2022-03-24 PROCEDURE — 99214 OFFICE O/P EST MOD 30 MIN: CPT | Performed by: NURSE PRACTITIONER

## 2022-03-24 RX ORDER — RABEPRAZOLE SODIUM 20 MG/1
20 TABLET, DELAYED RELEASE ORAL
Qty: 60 TABLET | Refills: 11 | Status: SHIPPED | OUTPATIENT
Start: 2022-03-24 | End: 2022-04-07

## 2022-03-24 ASSESSMENT — ENCOUNTER SYMPTOMS
ABDOMINAL PAIN: 0
SHORTNESS OF BREATH: 0
TROUBLE SWALLOWING: 0
ANAL BLEEDING: 0
NAUSEA: 1
RECTAL PAIN: 0
CONSTIPATION: 1
ABDOMINAL DISTENTION: 0
BACK PAIN: 1
SORE THROAT: 0
VOMITING: 0
DIARRHEA: 1
COUGH: 0
VOICE CHANGE: 0
BLOOD IN STOOL: 0

## 2022-03-24 NOTE — PATIENT INSTRUCTIONS
You are going to have an Endoscopy and here are some basic instructions:    Nothing to eat or drink after midnight EXCEPT:  PLEASE TAKE MEDICATION(S) FOR HIGH BLOOD PRESSURE, SEIZURES, HEART, AND THYROID WITH A SIP OF WATER AT LEAST 2 HOURS PRIOR TO ARRIVAL TIME.   YOU MAY ALSO TAKE ANY INHALERS YOU ARE PRESCRIBED. You will not be able to drive for 24 hours after the procedure due to sedation. Bring a  with you the day of the procedure. No aspirin, ibuprofen, naproxen, fish oil or vitamin E for 5 days before procedure. Continue current medications. If you are on blood thinners, clearance from the prescribing physician will be obtained before your procedure is scheduled. Increased [email or web address] may be associated with discontinuation of your blood thinner and include, but not limited to, stroke, TIA, or cardiac event. If biopsies are taken during the procedure they will be sent to a pathologist for analysis. You will be notified by mail of the pathology results in 2-3 weeks. Your physician may also schedule a follow up appointment with the nurse practitioner to discuss pathology, symptoms or to check if you have had any problems related to your procedure. If you prefer not to return to the office after your procedure please discuss this with your physician on the day of your procedure.

## 2022-03-24 NOTE — PROGRESS NOTES
Subjective:      Marely Alas is a53 y.o. female  Chief Complaint   Patient presents with    Follow-up       HPI  PCP: Yamel Bennett MD  Previous pt of VERA Meek  New pt to me  Reports heartburn  Chronic for 24 years  Has been on prevacid BID for many years  And carafate prn  She reports for many years she feels she may have esophageal spasms  Pain in mid upper esophagus at times. Has breakthrough indigestion with discomfort and belching and burning frequently  Takes gaviscon at bedtime and sometimes has to wake up to take another dose  Also has nausea with this sometimes. She has tried tried and failed prilosec and nexium in the past.  She asks about elavil and if this may be beneficial, but really doensnt want to try it at this time    Reports sometimes she chronically has alternating normal stools with diarrhea and constipation, this is nothing new. No further lower GI complaints    Family HX:    Pt denies family hx of colon polyps, colon CA, inflammatory bowel dx, gastric CA and esophageal CA. Past Medical History:   Diagnosis Date    Acid reflux     Anemia     BRCA negative     Chronic back pain     GERD (gastroesophageal reflux disease)     Hypothyroidism     Irritable bowel syndrome     Premature atrial contraction     Skipped heart beats     Thyroid disease     Thyroid mass     Vitamin D deficiency           Past Surgical History:   Procedure Laterality Date    BREAST ENHANCEMENT SURGERY      BREAST SURGERY      Augmentation,Biopsy+    CHOLECYSTECTOMY      COLONOSCOPY  2008???    [facility]    COLONOSCOPY  12/12/2012    : NEGATIVE FOR EVIDENCE OF CELIAC DISEASE.  COLONOSCOPY N/A 04/22/2021    Dr Estela Rahman, 5 yr recall    HEMORRHOID SURGERY      THYROID SURGERY      Biopsy    THYROID SURGERY      nodule removed    TONSILLECTOMY      TUBAL LIGATION      UPPER GASTROINTESTINAL ENDOSCOPY  2008? ?     Dr Patrick Likes ENDOSCOPY  02/12/2015 : barbara neg    UPPER GASTROINTESTINAL ENDOSCOPY N/A 08/05/2019    Dr Peggy Garcia exam, NEG EoE       Social History     Socioeconomic History    Marital status:      Spouse name: None    Number of children: None    Years of education: None    Highest education level: None   Occupational History    None   Tobacco Use    Smoking status: Never Smoker    Smokeless tobacco: Never Used   Vaping Use    Vaping Use: Never used   Substance and Sexual Activity    Alcohol use: Yes     Comment: couple times per week    Drug use: No    Sexual activity: Yes     Partners: Male     Birth control/protection: Surgical   Other Topics Concern    None   Social History Narrative    ** Merged History Encounter **          Social Determinants of Health     Financial Resource Strain:     Difficulty of Paying Living Expenses: Not on file   Food Insecurity:     Worried About Running Out of Food in the Last Year: Not on file    Nilesh of Food in the Last Year: Not on file   Transportation Needs:     Lack of Transportation (Medical): Not on file    Lack of Transportation (Non-Medical):  Not on file   Physical Activity:     Days of Exercise per Week: Not on file    Minutes of Exercise per Session: Not on file   Stress:     Feeling of Stress : Not on file   Social Connections:     Frequency of Communication with Friends and Family: Not on file    Frequency of Social Gatherings with Friends and Family: Not on file    Attends Mormon Services: Not on file    Active Member of Clubs or Organizations: Not on file    Attends Club or Organization Meetings: Not on file    Marital Status: Not on file   Intimate Partner Violence:     Fear of Current or Ex-Partner: Not on file    Emotionally Abused: Not on file    Physically Abused: Not on file    Sexually Abused: Not on file   Housing Stability:     Unable to Pay for Housing in the Last Year: Not on file    Number of [place] in the Last Year: Not on file    Unstable Housing in the Last Year: Not on file       Allergies   Allergen Reactions    Celebrex [Celecoxib] Anaphylaxis       Current Outpatient Medications   Medication Sig Dispense Refill    RABEprazole (ACIPHEX) 20 MG tablet Take 1 tablet by mouth 2 times daily (before meals) Take first thing in the morning on an empty stomach. 60 tablet 11    sucralfate (CARAFATE) 1 GM/10ML suspension Take 10 mLs by mouth 4 times daily (Patient taking differently: Take 1 g by mouth as needed ) 1200 mL 3    Triamcinolone Acetonide (NASACORT ALLERGY 24HR NA) by Nasal route daily as needed      levothyroxine (SYNTHROID) 75 MCG tablet TAKE 1 TABLET BY MOUTH ONE TIME A DAY 90 tablet 3    tiZANidine (ZANAFLEX) 4 MG tablet Take 1 tablet by mouth every 6 hours as needed (muscle spasms) 60 tablet 0    vitamin D (ERGOCALCIFEROL) 1.25 MG (56875 UT) CAPS capsule TAKE 1 CAPSULE BY MOUTH ONCE A WEEK 12 capsule 3    lansoprazole (PREVACID) 30 MG delayed release capsule TAKE ONE CAPSULE TWO TIMES A  capsule 3    conjugated estrogens (PREMARIN) 0.625 MG/GM vaginal cream Place 0.5 g vaginally daily 1 Tube 3    loratadine (CLARITIN) 10 MG capsule Take 10 mg by mouth daily      Alum Hydroxide-Mag Carbonate (GAVISCON PO) Take by mouth nightly        No current facility-administered medications for this visit. Review of Systems   Constitutional: Positive for fatigue. Negative for appetite change, fever and unexpected weight change. HENT: Positive for tinnitus. Negative for sore throat, trouble swallowing and voice change. Respiratory: Negative for cough and shortness of breath. Cardiovascular: Negative for chest pain, palpitations and leg swelling. Gastrointestinal: Positive for constipation, diarrhea and nausea. Negative for abdominal distention, abdominal pain, anal bleeding, blood in stool, rectal pain and vomiting. Genitourinary: Negative for hematuria. Musculoskeletal: Positive for back pain. Negative for arthralgias and neck pain. Neurological: Negative for dizziness, weakness, light-headedness and headaches. Psychiatric/Behavioral: Negative for dysphoric mood and sleep disturbance. The patient is nervous/anxious. All other systems reviewed and are negative. Objective:     Physical Exam  Vitals and nursing note reviewed. Constitutional:       Appearance: She is well-developed. Comments: /80 (Site: Right Upper Arm)   Pulse 81   Ht 5' 7\" (1.702 m)   Wt 141 lb (64 kg)   SpO2 98%   BMI 22.08 kg/m²    Eyes:      General: No scleral icterus. Conjunctiva/sclera: Conjunctivae normal.      Pupils: Pupils are equal, round, and reactive to light. Neck:      Thyroid: No thyromegaly. Cardiovascular:      Rate and Rhythm: Normal rate and regular rhythm. Heart sounds: Normal heart sounds. No murmur heard. No friction rub. No gallop. Pulmonary:      Effort: Pulmonary effort is normal. No respiratory distress. Breath sounds: Normal breath sounds. Abdominal:      General: Bowel sounds are normal. There is no distension. Palpations: Abdomen is soft. Tenderness: There is no abdominal tenderness. There is no rebound. Musculoskeletal:         General: No deformity. Normal range of motion. Cervical back: Normal range of motion and neck supple. Neurological:      Mental Status: She is alert and oriented to person, place, and time. Cranial Nerves: No cranial nerve deficit. Psychiatric:         Judgment: Judgment normal.           Assessment:       Diagnosis Orders   1. Chronic heartburn  RABEprazole (ACIPHEX) 20 MG tablet    ESOPHAGOSCOPY / EGD   2. Dyspepsia  ESOPHAGOSCOPY / EGD   3. Indigestion  ESOPHAGOSCOPY / EGD   4. Nausea  ESOPHAGOSCOPY / EGD   5. Esophageal pain  ESOPHAGOSCOPY / EGD         Plan:      1. First she needs to start taking her prevacid on empty stomach to see if this works better for her.  If not, stop the prevacid and start aciphex BID I escribed. F/u in 4-6 weeks if not effective. May need referral for BRAVO/impedence studies as Dr Dalton Lopez has recommended previously  2. Schedule outpatient endoscopy r/o h pylori. Patient advised no Aspirin, Fish Oil, Vit E or NSAIDs 5 (five) days before procedure. Follow-up Visi  Pt Education:   Risks, benefits, and alternatives to endoscopy were discussed. Patient voices understanding of risks of, but not limited to, perforation, bleeding, and infection. The risk of perforation is increased with esophageal dilatation. All questions answered to patient's satisfaction. Patient is agreable to proceed.

## 2022-03-30 ENCOUNTER — TELEPHONE (OUTPATIENT)
Dept: GASTROENTEROLOGY | Age: 57
End: 2022-03-30

## 2022-03-30 NOTE — TELEPHONE ENCOUNTER
Assessment:        Diagnosis Orders   1. Chronic heartburn  RABEprazole (ACIPHEX) 20 MG tablet     ESOPHAGOSCOPY / EGD   2. Dyspepsia  ESOPHAGOSCOPY / EGD   3. Indigestion  ESOPHAGOSCOPY / EGD   4. Nausea  ESOPHAGOSCOPY / EGD   5. Esophageal pain  ESOPHAGOSCOPY / EGD                    Plan:      1. First she needs to start taking her prevacid on empty stomach to see if this works better for her. If not, stop the prevacid and start aciphex BID I escribed. F/u in 4-6 weeks if not effective. May need referral for BRAVO/impedence studies as Dr Kwadwo Galloway has recommended previously  2. Schedule outpatient endoscopy r/o h pylori. Patient advised no Aspirin, Fish Oil, Vit E or NSAIDs 5 (five) days before procedure. Follow-up Visi      Last visit [facility] aprn 3-24-22, see above. FU 4-28-22. Hawa Mayes Pharmacist at [facility] called said the Aciphex 20 mg Bid is not paid for by the patient's insurance but they will pay at one daily, please advise. I called Hawa Mayes back and told her I had tried to call the patient ( NA) to be sure she tried the Prevacid first on an empty stomach but I was not able to reach her. Pharmacist spoke to [address] and said Rosa Elena Brady has not tried the Prevacid on an empty stomach first and said they will do that and if it does not work then they will address the Aciphex at Bid. I told the Pharmacist to let me know if I need to send PA for Bid dosing in for the patient and she said she will call me back if needed. I will send to [facility] as FYI information.   cma

## 2022-04-05 DIAGNOSIS — E03.9 HYPOTHYROIDISM, UNSPECIFIED TYPE: ICD-10-CM

## 2022-04-05 LAB
ALBUMIN SERPL-MCNC: 4.3 G/DL (ref 3.5–5.2)
ALP BLD-CCNC: 82 U/L (ref 35–104)
ALT SERPL-CCNC: 11 U/L (ref 5–33)
ANION GAP SERPL CALCULATED.3IONS-SCNC: 11 MMOL/L (ref 7–19)
AST SERPL-CCNC: 18 U/L (ref 5–32)
BASOPHILS ABSOLUTE: 0 K/UL (ref 0–0.2)
BASOPHILS RELATIVE PERCENT: 0.7 % (ref 0–1)
BILIRUB SERPL-MCNC: 0.4 MG/DL (ref 0.2–1.2)
BUN BLDV-MCNC: 13 MG/DL (ref 6–20)
CALCIUM SERPL-MCNC: 9.6 MG/DL (ref 8.6–10)
CHLORIDE BLD-SCNC: 106 MMOL/L (ref 98–111)
CHOLESTEROL, TOTAL: 215 MG/DL (ref 160–199)
CO2: 25 MMOL/L (ref 22–29)
CREAT SERPL-MCNC: 0.7 MG/DL (ref 0.5–0.9)
EOSINOPHILS ABSOLUTE: 0.1 K/UL (ref 0–0.6)
EOSINOPHILS RELATIVE PERCENT: 2.4 % (ref 0–5)
GFR AFRICAN AMERICAN: >59
GFR NON-AFRICAN AMERICAN: >60
GLUCOSE BLD-MCNC: 91 MG/DL (ref 74–109)
HCT VFR BLD CALC: 47.2 % (ref 37–47)
HDLC SERPL-MCNC: 101 MG/DL (ref 65–121)
HEMOGLOBIN: 14.7 G/DL (ref 12–16)
IMMATURE GRANULOCYTES #: 0 K/UL
LDL CHOLESTEROL CALCULATED: 97 MG/DL
LYMPHOCYTES ABSOLUTE: 1.5 K/UL (ref 1.1–4.5)
LYMPHOCYTES RELATIVE PERCENT: 31.5 % (ref 20–40)
MCH RBC QN AUTO: 28.9 PG (ref 27–31)
MCHC RBC AUTO-ENTMCNC: 31.1 G/DL (ref 33–37)
MCV RBC AUTO: 92.7 FL (ref 81–99)
MONOCYTES ABSOLUTE: 0.5 K/UL (ref 0–0.9)
MONOCYTES RELATIVE PERCENT: 10.8 % (ref 0–10)
NEUTROPHILS ABSOLUTE: 2.5 K/UL (ref 1.5–7.5)
NEUTROPHILS RELATIVE PERCENT: 54.4 % (ref 50–65)
PDW BLD-RTO: 13.3 % (ref 11.5–14.5)
PLATELET # BLD: 220 K/UL (ref 130–400)
PMV BLD AUTO: 9.9 FL (ref 9.4–12.3)
POTASSIUM SERPL-SCNC: 4.7 MMOL/L (ref 3.5–5)
RBC # BLD: 5.09 M/UL (ref 4.2–5.4)
SODIUM BLD-SCNC: 142 MMOL/L (ref 136–145)
TOTAL PROTEIN: 6.6 G/DL (ref 6.6–8.7)
TRIGL SERPL-MCNC: 83 MG/DL (ref 0–149)
TSH SERPL DL<=0.05 MIU/L-ACNC: 2.93 UIU/ML (ref 0.27–4.2)
VITAMIN D 25-HYDROXY: 29.6 NG/ML
WBC # BLD: 4.6 K/UL (ref 4.8–10.8)

## 2022-04-07 ENCOUNTER — OFFICE VISIT (OUTPATIENT)
Dept: INTERNAL MEDICINE | Age: 57
End: 2022-04-07
Payer: COMMERCIAL

## 2022-04-07 VITALS
SYSTOLIC BLOOD PRESSURE: 136 MMHG | HEIGHT: 67 IN | WEIGHT: 142.6 LBS | OXYGEN SATURATION: 100 % | DIASTOLIC BLOOD PRESSURE: 80 MMHG | HEART RATE: 66 BPM | BODY MASS INDEX: 22.38 KG/M2 | RESPIRATION RATE: 18 BRPM

## 2022-04-07 DIAGNOSIS — E78.5 HYPERLIPIDEMIA, UNSPECIFIED HYPERLIPIDEMIA TYPE: ICD-10-CM

## 2022-04-07 DIAGNOSIS — Z13.820 SCREENING FOR OSTEOPOROSIS: ICD-10-CM

## 2022-04-07 DIAGNOSIS — E55.9 VITAMIN D DEFICIENCY: ICD-10-CM

## 2022-04-07 DIAGNOSIS — E03.9 HYPOTHYROIDISM, UNSPECIFIED TYPE: ICD-10-CM

## 2022-04-07 DIAGNOSIS — Z12.31 ENCOUNTER FOR SCREENING MAMMOGRAM FOR MALIGNANT NEOPLASM OF BREAST: ICD-10-CM

## 2022-04-07 DIAGNOSIS — K21.9 GASTROESOPHAGEAL REFLUX DISEASE WITHOUT ESOPHAGITIS: ICD-10-CM

## 2022-04-07 DIAGNOSIS — Z91.09 ENVIRONMENTAL ALLERGIES: ICD-10-CM

## 2022-04-07 DIAGNOSIS — L73.9 FOLLICULITIS: Primary | ICD-10-CM

## 2022-04-07 DIAGNOSIS — N32.81 OAB (OVERACTIVE BLADDER): ICD-10-CM

## 2022-04-07 PROCEDURE — 99214 OFFICE O/P EST MOD 30 MIN: CPT | Performed by: INTERNAL MEDICINE

## 2022-04-07 RX ORDER — AMOXICILLIN 500 MG/1
500 CAPSULE ORAL 2 TIMES DAILY
Qty: 20 CAPSULE | Refills: 0 | Status: SHIPPED | OUTPATIENT
Start: 2022-04-07 | End: 2022-04-17

## 2022-04-07 SDOH — ECONOMIC STABILITY: FOOD INSECURITY: WITHIN THE PAST 12 MONTHS, THE FOOD YOU BOUGHT JUST DIDN'T LAST AND YOU DIDN'T HAVE MONEY TO GET MORE.: NEVER TRUE

## 2022-04-07 SDOH — ECONOMIC STABILITY: FOOD INSECURITY: WITHIN THE PAST 12 MONTHS, YOU WORRIED THAT YOUR FOOD WOULD RUN OUT BEFORE YOU GOT MONEY TO BUY MORE.: NEVER TRUE

## 2022-04-07 ASSESSMENT — PATIENT HEALTH QUESTIONNAIRE - PHQ9
1. LITTLE INTEREST OR PLEASURE IN DOING THINGS: 0
SUM OF ALL RESPONSES TO PHQ QUESTIONS 1-9: 0
2. FEELING DOWN, DEPRESSED OR HOPELESS: 0
SUM OF ALL RESPONSES TO PHQ9 QUESTIONS 1 & 2: 0

## 2022-04-07 ASSESSMENT — SOCIAL DETERMINANTS OF HEALTH (SDOH): HOW HARD IS IT FOR YOU TO PAY FOR THE VERY BASICS LIKE FOOD, HOUSING, MEDICAL CARE, AND HEATING?: NOT HARD AT ALL

## 2022-04-07 NOTE — PROGRESS NOTES
Chief Complaint   Patient presents with    6 Month Follow-Up    Other     Patient's  thinks she may have perianal folliculitis. HPI: Anita Rodriguez is a 55 y.o. female is here for follow-up of hypothyroidism, acid reflux and environmental allergies. She has had physical therapy for overactive bladder. She thinks that it may have helped somewhat. However, she feels like the electrodes may have irritated her rectal area. She states that the left side of her rectum is somewhat painful. She thinks that she may have some perirectal folliculitis. She has used Neosporin to the site. She is due for mammogram and bone density and we will order this today    Her cholesterol did go down from 2 41-2 15. Her acid reflux is stable. She does take Zanaflex as needed for back pain. She states that she has slowed down on playing pickle ball. She thinks that this is helped with the hip and back pain. Her acid reflux is stable. Her allergies are fairly stable. Past Medical History:   Diagnosis Date    Acid reflux     Anemia     BRCA negative     Chronic back pain     GERD (gastroesophageal reflux disease)     Hypothyroidism     Irritable bowel syndrome     Premature atrial contraction     Skipped heart beats     Thyroid disease     Thyroid mass     Vitamin D deficiency       Past Surgical History:   Procedure Laterality Date    BREAST ENHANCEMENT SURGERY      BREAST SURGERY      Augmentation,Biopsy+    CHOLECYSTECTOMY      COLONOSCOPY  2008???    [facility]    COLONOSCOPY  12/12/2012    : NEGATIVE FOR EVIDENCE OF CELIAC DISEASE.  COLONOSCOPY N/A 04/22/2021    Dr Page Mazariegos, 5 yr recall    HEMORRHOID SURGERY      THYROID SURGERY      Biopsy    THYROID SURGERY      nodule removed    TONSILLECTOMY      TUBAL LIGATION      UPPER GASTROINTESTINAL ENDOSCOPY  2008? ?     Dr Ness Falcon  02/12/2015    : barbara neg    UPPER GASTROINTESTINAL ENDOSCOPY N/A 08/05/2019    Dr Molly Munguia exam, NEG EoE      Social History     Socioeconomic History    Marital status:      Spouse name: None    Number of children: None    Years of education: None    Highest education level: None   Occupational History    None   Tobacco Use    Smoking status: Never Smoker    Smokeless tobacco: Never Used   Vaping Use    Vaping Use: Never used   Substance and Sexual Activity    Alcohol use: Yes     Comment: couple times per week    Drug use: No    Sexual activity: Yes     Partners: Male     Birth control/protection: Surgical   Other Topics Concern    None   Social History Narrative    ** Merged History Encounter **          Social Determinants of Health     Financial Resource Strain: Low Risk     Difficulty of Paying Living Expenses: Not hard at all   Food Insecurity: No Food Insecurity    Worried About Running Out of Food in the Last Year: Never true    Nilesh of Food in the Last Year: Never true   Transportation Needs: No Transportation Needs    Lack of Transportation (Medical): No    Lack of Transportation (Non-Medical):  No   Physical Activity:     Days of Exercise per Week: Not on file    Minutes of Exercise per Session: Not on file   Stress:     Feeling of Stress : Not on file   Social Connections:     Frequency of Communication with Friends and Family: Not on file    Frequency of Social Gatherings with Friends and Family: Not on file    Attends Pentecostalism Services: Not on file    Active Member of Clubs or Organizations: Not on file    Attends Club or Organization Meetings: Not on file    Marital Status: Not on file   Intimate Partner Violence:     Fear of Current or Ex-Partner: Not on file    Emotionally Abused: Not on file    Physically Abused: Not on file    Sexually Abused: Not on file   Housing Stability:     Unable to Pay for Housing in the Last Year: Not on file    Number of [place] in the Last Year: Not on file    Unstable Housing in the Last Year: Not on file      Family History   Problem Relation Age of Onset    Other Mother         brain bleed    Dementia Mother     High Blood Pressure Mother     Lung Cancer Father          54, lung cancer    Cancer Father         Lung-Smoker    Breast Cancer Sister 50         at 47, did not wake up after a nap    Heart Attack Sister     Other Other         brain aneurysm    Cancer Maternal Grandfather         rectal cancer    Colon Cancer Maternal Grandfather     Colon Polyps Maternal Grandfather     Esophageal Cancer Neg Hx     Liver Cancer Neg Hx     Liver Disease Neg Hx     Rectal Cancer Neg Hx     Stomach Cancer Neg Hx         Current Outpatient Medications   Medication Sig Dispense Refill    MISC NATURAL PRODUCTS EX Apply topically [facility] [facility] hormone compound nightly      amoxicillin (AMOXIL) 500 MG capsule Take 1 capsule by mouth 2 times daily for 10 days 20 capsule 0    sucralfate (CARAFATE) 1 GM/10ML suspension Take 10 mLs by mouth 4 times daily (Patient taking differently: Take 1 g by mouth as needed ) 1200 mL 3    Triamcinolone Acetonide (NASACORT ALLERGY 24HR NA) by Nasal route daily as needed      levothyroxine (SYNTHROID) 75 MCG tablet TAKE 1 TABLET BY MOUTH ONE TIME A DAY 90 tablet 3    tiZANidine (ZANAFLEX) 4 MG tablet Take 1 tablet by mouth every 6 hours as needed (muscle spasms) 60 tablet 0    vitamin D (ERGOCALCIFEROL) 1.25 MG (92195 UT) CAPS capsule TAKE 1 CAPSULE BY MOUTH ONCE A WEEK 12 capsule 3    lansoprazole (PREVACID) 30 MG delayed release capsule TAKE ONE CAPSULE TWO TIMES A  capsule 3    loratadine (CLARITIN) 10 MG capsule Take 10 mg by mouth daily      Alum Hydroxide-Mag Carbonate (GAVISCON PO) Take by mouth nightly       conjugated estrogens (PREMARIN) 0.625 MG/GM vaginal cream Place 0.5 g vaginally daily (Patient not taking: Reported on 2022) 1 Tube 3     No current facility-administered medications for this visit. Patient Active Problem List   Diagnosis    LLQ abdominal pain    Chronic constipation    Epigastric pressure    Heartburn    Indigestion    Acid reflux    Esophageal spasm    Hemorrhoids    Chest pain    Breast tenderness in female    Breast mass, left    History of breast augmentation    Iron deficiency anemia secondary to inadequate dietary iron intake    Other fatigue    Close exposure to COVID-19 virus    Chronic heartburn    Dyspepsia    Nausea    Esophageal pain        Review of Systems   Constitutional: Negative for activity change, appetite change, chills, diaphoresis, fatigue, fever and unexpected weight change. HENT: Negative for congestion, ear pain, facial swelling, hearing loss, mouth sores, nosebleeds, postnasal drip, rhinorrhea, sinus pressure, sneezing, sore throat, tinnitus, trouble swallowing and voice change. Eyes: Negative for photophobia, pain, discharge, redness, itching and visual disturbance. Respiratory: Negative for cough, choking, chest tightness, shortness of breath and wheezing. Cardiovascular: Negative for chest pain, palpitations and leg swelling. Gastrointestinal: Negative for abdominal distention, abdominal pain, anal bleeding, blood in stool, constipation, diarrhea, nausea, rectal pain and vomiting. Esophageal spasms/acid reflux is improving   Endocrine: Negative for cold intolerance, heat intolerance, polydipsia, polyphagia and polyuria. Genitourinary: Negative for decreased urine volume, difficulty urinating, dysuria, flank pain, frequency, hematuria and urgency. Musculoskeletal: Positive for arthralgias and back pain. Negative for gait problem, joint swelling, myalgias, neck pain and neck stiffness. Skin: Negative for color change, pallor and rash. Complains of soreness and irritation around the rectal site. Allergic/Immunologic: Negative for environmental allergies and food allergies.    Neurological: Negative for dizziness, tremors, syncope, weakness, light-headedness, numbness and headaches. Hematological: Negative for adenopathy. Does not bruise/bleed easily. Psychiatric/Behavioral: Negative for agitation, behavioral problems, confusion, decreased concentration, dysphoric mood, hallucinations, self-injury, sleep disturbance and suicidal ideas. The patient is not nervous/anxious and is not hyperactive. /80 (Site: Left Upper Arm, Position: Sitting)   Pulse 66   Resp 18   Ht 5' 7\" (1.702 m)   Wt 142 lb 9.6 oz (64.7 kg)   SpO2 100%   BMI 22.33 kg/m²   Physical Exam  Vitals and nursing note reviewed. Constitutional:       General: She is not in acute distress. Appearance: Normal appearance. She is normal weight. She is not ill-appearing, toxic-appearing or diaphoretic. HENT:      Head: Normocephalic and atraumatic. Right Ear: Tympanic membrane, ear canal and external ear normal. There is no impacted cerumen. Left Ear: Tympanic membrane, ear canal and external ear normal. There is no impacted cerumen. Nose: Nose normal. No congestion or rhinorrhea. Mouth/Throat:      Mouth: Mucous membranes are moist.      Pharynx: Oropharynx is clear. No oropharyngeal exudate or posterior oropharyngeal erythema. Eyes:      General: No scleral icterus. Right eye: No discharge. Left eye: No discharge. Extraocular Movements: Extraocular movements intact. Conjunctiva/sclera: Conjunctivae normal.      Pupils: Pupils are equal, round, and reactive to light. Neck:      Vascular: No carotid bruit. Cardiovascular:      Rate and Rhythm: Normal rate and regular rhythm. Pulses: Normal pulses. Heart sounds: Normal heart sounds. No murmur heard. No friction rub. No gallop. Pulmonary:      Effort: Pulmonary effort is normal. No respiratory distress. Breath sounds: Normal breath sounds. No stridor. No wheezing, rhonchi or rales.    Chest:      Chest wall: No tenderness. Abdominal:      General: There is no distension. Tenderness: There is no abdominal tenderness. There is no guarding. Genitourinary:     Comments: some redness and irritation around the rectal site. Musculoskeletal:         General: Tenderness present. No swelling, deformity or signs of injury. Cervical back: Normal range of motion and neck supple. No rigidity. No muscular tenderness. Right lower leg: No edema. Left lower leg: No edema. Comments: There is some tenderness on palpation of the right hip. Lymphadenopathy:      Cervical: No cervical adenopathy. Skin:     General: Skin is warm and dry. Capillary Refill: Capillary refill takes less than 2 seconds. Neurological:      General: No focal deficit present. Mental Status: She is alert and oriented to person, place, and time. Mental status is at baseline. Psychiatric:         Mood and Affect: Mood normal.         Behavior: Behavior normal.         Thought Content: Thought content normal.         Judgment: Judgment normal.         1. Folliculitis    2. Vitamin D deficiency    3. Hyperlipidemia, unspecified hyperlipidemia type    4. Screening for osteoporosis    5. Encounter for screening mammogram for malignant neoplasm of breast    6. Gastroesophageal reflux disease without esophagitis    7. Hypothyroidism, unspecified type    8. Environmental allergies    9. OAB (overactive bladder)        ASSESSMENT/PLAN:    72-year-old woman here for follow-up    1. Perirectal folliculitis: Amoxicillin prescribed. 2.  Acid reflux: Continue Carafate and Prevacid as prescribed    3. Hypothyroidism: Continue Synthroid at current dose    4. Environmental allergies: Doing well with Nasacort and Claritin    5. Vitamin D deficiency: Continue ergocalciferol    6. Overactive bladder: Continue physical therapy    7. Hyperlipidemia: Lipids are trending downward    8.   Mammogram and bone density have been elma Sevilla was seen today for 6 month follow-up and other. Diagnoses and all orders for this visit:    Folliculitis    Vitamin D deficiency  -     Vitamin D 25 Hydroxy; Future    Hyperlipidemia, unspecified hyperlipidemia type  -     CBC with Auto Differential; Future  -     Comprehensive Metabolic Panel; Future  -     Lipid Panel; Future  -     TSH; Future    Screening for osteoporosis  -     DEXA BONE DENSITY 2 SITES; Future    Encounter for screening mammogram for malignant neoplasm of breast  -     [facility] DIGITAL SCREEN W OR WO CAD BILATERAL; Future    Gastroesophageal reflux disease without esophagitis    Hypothyroidism, unspecified type    Environmental allergies    OAB (overactive bladder)    Other orders  -     amoxicillin (AMOXIL) 500 MG capsule; Take 1 capsule by mouth 2 times daily for 10 days          Return in about 6 months (around 10/7/2022). Orders Placed This Encounter   Procedures    DEXA BONE DENSITY 2 SITES     Standing Status:   Future     Standing Expiration Date:   4/7/2023     Order Specific Question:   Reason for exam:     Answer:   screening    YUSRA DIGITAL SCREEN W OR WO CAD BILATERAL     Standing Status:   Future     Standing Expiration Date:   6/7/2023     Order Specific Question:   Reason for exam:     Answer:   screening     Order Specific Question:   Does this patient have implants? Answer:   Yes     Order Specific Question:   Does this patient have a personal history of breast cancer? Answer:   No     Order Specific Question:   Where was last mammogram performed? Answer:   [facility]     Order Specific Question:   When was last mammogram performed?      Answer:   4/22/2021    CBC with Auto Differential     Fast 12 hours     Standing Status:   Future     Standing Expiration Date:   4/7/2023    Comprehensive Metabolic Panel     Fasting 12 hours     Standing Status:   Future     Standing Expiration Date:   4/7/2023    Lipid Panel     Standing Status:   Future Standing Expiration Date:   4/7/2023     Order Specific Question:   Is Patient Fasting?/# of Hours     Answer:   12    TSH     Fast 12 hours     Standing Status:   Future     Standing Expiration Date:   4/7/2023    Vitamin D 25 Hydroxy     Standing Status:   Future     Standing Expiration Date:   4/7/2023       Isa Flores MD

## 2022-04-11 RX ORDER — LEVOTHYROXINE SODIUM 0.07 MG/1
TABLET ORAL
Qty: 90 TABLET | Refills: 3 | Status: SHIPPED | OUTPATIENT
Start: 2022-04-11

## 2022-04-11 RX ORDER — LANSOPRAZOLE 30 MG/1
CAPSULE, DELAYED RELEASE ORAL
Qty: 180 CAPSULE | Refills: 3 | Status: SHIPPED | OUTPATIENT
Start: 2022-04-11

## 2022-04-11 ASSESSMENT — ENCOUNTER SYMPTOMS
ANAL BLEEDING: 0
EYE ITCHING: 0
RECTAL PAIN: 0
DIARRHEA: 0
RHINORRHEA: 0
SORE THROAT: 0
PHOTOPHOBIA: 0
CHEST TIGHTNESS: 0
TROUBLE SWALLOWING: 0
VOICE CHANGE: 0
BACK PAIN: 1
BLOOD IN STOOL: 0
VOMITING: 0
ABDOMINAL PAIN: 0
SINUS PRESSURE: 0
NAUSEA: 0
EYE REDNESS: 0
CONSTIPATION: 0
COUGH: 0
FACIAL SWELLING: 0
EYE DISCHARGE: 0
WHEEZING: 0
SHORTNESS OF BREATH: 0
ABDOMINAL DISTENTION: 0
CHOKING: 0
EYE PAIN: 0
COLOR CHANGE: 0

## 2022-04-11 NOTE — TELEPHONE ENCOUNTER
Requested Prescriptions     Pending Prescriptions Disp Refills    levothyroxine (SYNTHROID) 75 MCG tablet [Pharmacy Med Name: LEVOTHYROXINE SODIUM 75MCG TABS] 90 tablet 3     Sig: TAKE ONE TABLET ONE TIME DAILY    lansoprazole (PREVACID) 30 MG delayed release capsule [Pharmacy Med Name: LANSOPRAZOLE 30MG CPDR] 180 capsule 3     Sig: TAKE ONE CAPSULE TWO TIMES A DAY

## 2022-04-21 ENCOUNTER — HOSPITAL ENCOUNTER (OUTPATIENT)
Age: 57
Setting detail: OUTPATIENT SURGERY
Discharge: HOME | End: 2022-04-21
Attending: INTERNAL MEDICINE | Admitting: INTERNAL MEDICINE
Payer: COMMERCIAL

## 2022-04-21 ENCOUNTER — APPOINTMENT (OUTPATIENT)
Dept: OPERATING ROOM | Age: 57
End: 2022-04-21

## 2022-04-21 ENCOUNTER — ANESTHESIA (OUTPATIENT)
Dept: OPERATING ROOM | Age: 57
End: 2022-04-21

## 2022-04-21 ENCOUNTER — HOSPITAL ENCOUNTER (OUTPATIENT)
Age: 57
Setting detail: SPECIMEN
Discharge: HOME | End: 2022-04-21
Payer: COMMERCIAL

## 2022-04-21 ENCOUNTER — ANESTHESIA EVENT (OUTPATIENT)
Dept: OPERATING ROOM | Age: 57
End: 2022-04-21

## 2022-04-21 VITALS
SYSTOLIC BLOOD PRESSURE: 121 MMHG | HEART RATE: 57 BPM | DIASTOLIC BLOOD PRESSURE: 81 MMHG | HEIGHT: 67 IN | OXYGEN SATURATION: 98 % | WEIGHT: 142 LBS | BODY MASS INDEX: 22.29 KG/M2 | RESPIRATION RATE: 20 BRPM | TEMPERATURE: 97.5 F

## 2022-04-21 VITALS — OXYGEN SATURATION: 98 % | DIASTOLIC BLOOD PRESSURE: 71 MMHG | SYSTOLIC BLOOD PRESSURE: 126 MMHG

## 2022-04-21 PROCEDURE — 88342 IMHCHEM/IMCYTCHM 1ST ANTB: CPT

## 2022-04-21 PROCEDURE — 43239 EGD BIOPSY SINGLE/MULTIPLE: CPT

## 2022-04-21 PROCEDURE — 43239 EGD BIOPSY SINGLE/MULTIPLE: CPT | Performed by: INTERNAL MEDICINE

## 2022-04-21 PROCEDURE — 88305 TISSUE EXAM BY PATHOLOGIST: CPT

## 2022-04-21 RX ORDER — SODIUM CHLORIDE 9 MG/ML
INJECTION, SOLUTION INTRAVENOUS CONTINUOUS PRN
Status: DISCONTINUED | OUTPATIENT
Start: 2022-04-21 | End: 2022-04-21 | Stop reason: SDUPTHER

## 2022-04-21 RX ORDER — PROPOFOL 10 MG/ML
INJECTION, EMULSION INTRAVENOUS PRN
Status: DISCONTINUED | OUTPATIENT
Start: 2022-04-21 | End: 2022-04-21 | Stop reason: SDUPTHER

## 2022-04-21 RX ORDER — LIDOCAINE HYDROCHLORIDE 10 MG/ML
INJECTION, SOLUTION INFILTRATION; PERINEURAL PRN
Status: DISCONTINUED | OUTPATIENT
Start: 2022-04-21 | End: 2022-04-21 | Stop reason: SDUPTHER

## 2022-04-21 RX ORDER — SODIUM CHLORIDE 9 MG/ML
INJECTION, SOLUTION INTRAVENOUS CONTINUOUS
Status: DISCONTINUED | OUTPATIENT
Start: 2022-04-21 | End: 2022-04-21 | Stop reason: HOSPADM

## 2022-04-21 RX ADMIN — PROPOFOL 230 MG: 10 INJECTION, EMULSION INTRAVENOUS at 12:00

## 2022-04-21 RX ADMIN — LIDOCAINE HYDROCHLORIDE 40 MG: 10 INJECTION, SOLUTION INFILTRATION; PERINEURAL at 12:00

## 2022-04-21 RX ADMIN — SODIUM CHLORIDE: 9 INJECTION, SOLUTION INTRAVENOUS at 11:00

## 2022-04-21 RX ADMIN — SODIUM CHLORIDE: 9 INJECTION, SOLUTION INTRAVENOUS at 11:57

## 2022-04-21 NOTE — ANESTHESIA POSTPROCEDURE EVALUATION
Department of Anesthesiology  Postprocedure Note    Patient: Leena Alatorre  MRN: [id number]  YOB: 1965  Date of evaluation: 4/21/2022  Time:  12:09 PM     Procedure Summary     Date: 04/21/22 Room / Location: [facility] ENDO 02 / [address]    Anesthesia Start: 0378 Anesthesia Stop:     Procedure: EGD BIOPSY (N/A Esophagus) Diagnosis: (chr heartburn)    Surgeons: Real Castelan MD Responsible Provider: VERA Hightower CRNA    Anesthesia Type: general, TIVA ASA Status: 1          Anesthesia Type: No value filed. Jacklyn Phase I:      Jacklyn Phase II:      Last vitals: Reviewed and per EMR flowsheets.        Anesthesia Post Evaluation    Patient location during evaluation: bedside  Patient participation: complete - patient participated  Level of consciousness: sleepy but conscious  Pain score: 0  Airway patency: patent  Nausea & Vomiting: no nausea and no vomiting  Complications: no  Cardiovascular status: hemodynamically stable and blood pressure returned to baseline  Respiratory status: acceptable and nasal cannula  Hydration status: stable

## 2022-04-21 NOTE — ANESTHESIA PRE PROCEDURE
Department of Anesthesiology  Preprocedure Note       Name:  Franny Clay   Age:  55 y.o.  :  1965                                          MRN:  [id number]         Date:  2022      Surgeon: Prashant Patiño):  Partha Canchola MD    Procedure: Procedure(s):  EGD BIOPSY    Medications prior to admission:   Prior to Admission medications    Medication Sig Start Date End Date Taking? Authorizing Provider   levothyroxine (SYNTHROID) 75 MCG tablet TAKE ONE TABLET ONE TIME DAILY 22   Elmer Montes De Oca MD   lansoprazole (PREVACID) 30 MG delayed release capsule TAKE ONE CAPSULE TWO TIMES A DAY 22   Elmer Montes De Oca MD   MISC NATURAL PRODUCTS EX Apply topically [facility] hormone compound nightly    Historical Provider, MD   sucralfate (CARAFATE) 1 GM/10ML suspension Take 10 mLs by mouth 4 times daily  Patient taking differently: Take 1 g by mouth as needed  21   Elmer Montes De Oca MD   Triamcinolone Acetonide (NASACORT ALLERGY 24HR NA) by Nasal route daily as needed    Historical Provider, MD   tiZANidine (ZANAFLEX) 4 MG tablet Take 1 tablet by mouth every 6 hours as needed (muscle spasms) 21   Elmer Montes De Oca MD   vitamin D (ERGOCALCIFEROL) 1.25 MG (50739 UT) CAPS capsule TAKE 1 CAPSULE BY MOUTH ONCE A WEEK 21   Elmer Montes De Oca MD   conjugated estrogens (PREMARIN) 0.625 MG/GM vaginal cream Place 0.5 g vaginally daily  Patient not taking: Reported on 2022   Elmer Montes De Oca MD   loratadine (CLARITIN) 10 MG capsule Take 10 mg by mouth daily    Historical Provider, MD   Alum Hydroxide-Mag Carbonate (GAVISCON PO) Take by mouth nightly     Historical Provider, MD       Current medications:    No current outpatient medications on file. No current facility-administered medications for this visit. Allergies:     Allergies   Allergen Reactions    Celebrex [Celecoxib] Anaphylaxis       Problem List:    Patient Active Problem List   Diagnosis Code    LLQ abdominal pain R10.32    Chronic constipation K59.09    Epigastric pressure R10.13    Heartburn R12    Indigestion K30    Acid reflux K21.9    Esophageal spasm K22.4    Hemorrhoids K64.9    Chest pain R07.9    Breast tenderness in female N64.4    Breast mass, left N63.20    History of breast augmentation Z98.82    Iron deficiency anemia secondary to inadequate dietary iron intake D50.8    Other fatigue R53.83    Close exposure to COVID-19 virus Z20.822    Chronic heartburn R12    Dyspepsia R10.13    Nausea R11.0    Esophageal pain K22.89       Past Medical History:        Diagnosis Date    Acid reflux     Anemia     BRCA negative     Chronic back pain     GERD (gastroesophageal reflux disease)     Hypothyroidism     Irritable bowel syndrome     Premature atrial contraction     Skipped heart beats     Thyroid disease     Thyroid mass     Vitamin D deficiency        Past Surgical History:        Procedure Laterality Date    BREAST ENHANCEMENT SURGERY      BREAST SURGERY      Augmentation,Biopsy+    CHOLECYSTECTOMY      COLONOSCOPY  2008???    Praveen    COLONOSCOPY  12/12/2012    : NEGATIVE FOR EVIDENCE OF CELIAC DISEASE.  COLONOSCOPY N/A 04/22/2021    Dr You Peña, 5 yr recall    HEMORRHOID SURGERY      THYROID SURGERY      Biopsy    THYROID SURGERY      nodule removed    TONSILLECTOMY      TUBAL LIGATION      UPPER GASTROINTESTINAL ENDOSCOPY  2008? ? Dr Jesus Martinez  02/12/2015    : Jhonny saldivar    UPPER GASTROINTESTINAL ENDOSCOPY N/A 08/05/2019    Dr Leiva Minus exam, NEG EoE       Social History:    Social History     Tobacco Use    Smoking status: Never Smoker    Smokeless tobacco: Never Used   Substance Use Topics    Alcohol use: Yes     Comment: couple times per week                                Counseling given: Not Answered      Vital Signs (Current): There were no vitals filed for this visit. BP Readings from Last 3 Encounters:   04/07/22 136/80   03/24/22 120/80   01/27/22 138/70       NPO Status:                                                                                 BMI:   Wt Readings from Last 3 Encounters:   04/07/22 142 lb 9.6 oz (64.7 kg)   03/24/22 141 lb (64 kg)   12/14/21 144 lb 12.8 oz (65.7 kg)     There is no height or weight on file to calculate BMI.    CBC:   Lab Results   Component Value Date    WBC 4.6 04/05/2022    RBC 5.09 04/05/2022    HGB 14.7 04/05/2022    HCT 47.2 04/05/2022    MCV 92.7 04/05/2022    RDW 13.3 04/05/2022     04/05/2022       CMP:   Lab Results   Component Value Date     04/05/2022    K 4.7 04/05/2022     04/05/2022    CO2 25 04/05/2022    BUN 13 04/05/2022    CREATININE 0.7 04/05/2022    GFRAA >59 04/05/2022    LABGLOM >60 04/05/2022    GLUCOSE 91 04/05/2022    PROT 6.6 04/05/2022    PROT 6.4 02/22/2013    CALCIUM 9.6 04/05/2022    BILITOT 0.4 04/05/2022    ALKPHOS 82 04/05/2022    AST 18 04/05/2022    ALT 11 04/05/2022       POC Tests: No results for input(s): POCGLU, POCNA, POCK, POCCL, POCBUN, POCHEMO, POCHCT in the last 72 hours.     Coags:   Lab Results   Component Value Date    PROTIME 13.2 02/22/2013    INR 1.04 02/22/2013       HCG (If Applicable):   Lab Results   Component Value Date    PREGTESTUR Negative 08/05/2019        ABGs: No results found for: PHART, PO2ART, LUC3VWK, AJA9CJP, BEART, K7YVRCLY     Type & Screen (If Applicable):  No results found for: LABABO, LABRH    Drug/Infectious Status (If Applicable):  No results found for: HIV, HEPCAB    COVID-19 Screening (If Applicable):   Lab Results   Component Value Date    COVID19 DETECTED 01/31/2022           Anesthesia Evaluation  Patient summary reviewed no history of anesthetic complications:   Airway: Mallampati: II  TM distance: >3 FB   Neck ROM: full  Mouth opening: < 3 FB Dental: normal exam         Pulmonary:Negative Pulmonary ROS and normal exam Cardiovascular:  Exercise tolerance: good (>4 METS),            Beta Blocker:  Not on Beta Blocker         Neuro/Psych:   Negative Neuro/Psych ROS              GI/Hepatic/Renal:   (+) GERD: well controlled, bowel prep,          ROS comment: occ etoh use. Endo/Other:    (+) hypothyroidism::., .                 Abdominal:             Vascular: negative vascular ROS. Other Findings:               Anesthesia Plan      general and TIVA     ASA 1       Induction: intravenous. Anesthetic plan and risks discussed with patient.                       VERA Quevedo - CRNA   4/21/2022

## 2022-04-21 NOTE — OP NOTE
Endoscopic Procedure Note    Patient: Dionisio Centers: 1965  Med Rec#: [id number] Acc#: [id number]     Primary Care Provider Mateus Yates MD  Referring Provider: VERA Martinez    Endoscopist: Jonnathan Acosta MD    Date of Procedure:  4/21/2022    Procedure:   1. EGD with biopsy    Indications:   1. Reflux     Anesthesia:  Sedation was administered by anesthesia who monitored the patient during the procedure. Estimated Blood Loss: minimal    Procedure:   After reviewing the patient's chart and obtaining informed consent, the patient was placed in the left lateral decubitus position. A forward-viewing Olympus endoscope was lubricated and inserted through the mouth into the oropharynx. Under direct visualization, the upper esophagus was intubated. The scope was advanced to the level of the third portion of duodenum. Scope was slowly withdrawn with careful inspection of the mucosal surfaces. The scope was retroflexed for inspection of the gastric fundus and incisura. Findings and maneuvers are listed in impression below. The patient tolerated the procedure well. The scope was removed. There were no immediate complications. Findings:   Esophagus: abnormal: there is minimal mucosal changes of esophagitis vs Guaman's- biopsied    There is no hiatal hernia present. Stomach: Abnormal: Mild mucosal changes suggestive of gastritis noted -  Gastric biopsies were taken from the antrum and body to rule out Helicobacter pylori infection. Duodenum: Normal      IMPRESSION:  1. Gastritis- biopsied   2. Esophagitis vs Guaman's- biopsied     RECOMMENDATIONS:    1. Await path results, the patient will be contacted in 7-10 days with biopsy results. 2.  Continue PPI  3. Lifestyle modifications for reflux   4. Trial of FDgard (OTC)    The results were discussed with the patient and family. A copy of the images obtained were given to the patient.      Stephens Dandy, am scribing for and in the presence of Dr. Kiran Quintana MD.  Electronically signed by Juan Cueva RN on 4/21/2022 at 10:38 AM    I personally performed the services described in this documentation as scribed by Kameron Parrish, and it appears accurate and complete.      Kathi Liao MD  4/21/2022

## 2022-04-21 NOTE — H&P
Patient Name: Dedrick Cates  : 1965  MRN: [id number]  DATE: 22    Allergies: Allergies   Allergen Reactions    Celebrex [Celecoxib] Anaphylaxis        ENDOSCOPY  History and Physical    Procedure:    [] Diagnostic Colonoscopy       [] Screening Colonoscopy  [x] EGD      [] ERCP      [] EUS       [] Other    [x] Previous office notes/History and Physical reviewed from the patients chart. Please see EMR for further details of HPI. I have examined the patient's status immediately prior to the procedure and:      Indications/HPI:    []Abdominal Pain   []Barretts  []Screening/Surveillance   []History of Polyps  []Dysphagia            [] +Cologard/DNA testing  []Abnormal Imaging              []EOE Hx              [] Family Hx of CRC/Polyps  []Anemia                            []Food Impaction       []Recent Poor Prep  []GI Bleed             []Lymphadenopathy  []History of Polyps  []Change in bowel habits [x]Heartburn/Reflux  []Cancer- GI/Lung  []Chest Pain - Non Cardiac []Heme (+) Stool []Ulcers  []Constipation  []Hemoptysis  []Incontinence    []Diarrhea  []Hypoxemia  []Rectal Bleed (BRBPR)  []Nausea/Vomiting   [] Varices  []Crohns/Colitis  []Pancreatic Cyst   [] Cirrhosis   []Pancreatitis    []Abnormal MRCP  []Elevated LFT [] Stent Removal, Previous ERCP  []Other:     Anesthesia:   [x] MAC [] Moderate Sedation   [] General   [] None     ROS: 12 pt Review of Symptoms was negative unless mentioned above    Medications:   Prior to Admission medications    Medication Sig Start Date End Date Taking?  Authorizing Provider   levothyroxine (SYNTHROID) 75 MCG tablet TAKE ONE TABLET ONE TIME DAILY 22   Juan Carlos Rainey MD   lansoprazole (PREVACID) 30 MG delayed release capsule TAKE ONE CAPSULE TWO TIMES A DAY 22   Juan Carlos Rainey MD   MISC NATURAL PRODUCTS EX Apply topically [facility] hormone compound nightly    Historical Provider, MD   sucralfate (CARAFATE) 1 GM/10ML suspension Take 10 mLs by mouth 4 times daily  Patient taking differently: Take 1 g by mouth as needed  12/14/21   Dee Coughlin MD   Triamcinolone Acetonide (NASACORT ALLERGY 24HR NA) by Nasal route daily as needed    Historical Provider, MD   tiZANidine (ZANAFLEX) 4 MG tablet Take 1 tablet by mouth every 6 hours as needed (muscle spasms) 1/26/21   Dee Coughlin MD   vitamin D (ERGOCALCIFEROL) 1.25 MG (32980 UT) CAPS capsule TAKE 1 CAPSULE BY MOUTH ONCE A WEEK 1/18/21   Dee Coughlin MD   conjugated estrogens (PREMARIN) 0.625 MG/GM vaginal cream Place 0.5 g vaginally daily  Patient not taking: Reported on 4/7/2022 6/8/20   Dee Coughlin MD   loratadine (CLARITIN) 10 MG capsule Take 10 mg by mouth daily    Historical Provider, MD   Alum Hydroxide-Mag Carbonate (GAVISCON PO) Take by mouth nightly     Historical Provider, MD       Past Medical History:  Past Medical History:   Diagnosis Date    Acid reflux     Anemia     BRCA negative     Chronic back pain     GERD (gastroesophageal reflux disease)     Hypothyroidism     Irritable bowel syndrome     Premature atrial contraction     Skipped heart beats     Thyroid disease     Thyroid mass     Vitamin D deficiency        Past Surgical History:  Past Surgical History:   Procedure Laterality Date    BREAST ENHANCEMENT SURGERY      BREAST SURGERY      Augmentation,Biopsy+    CHOLECYSTECTOMY      COLONOSCOPY  2008???    Praveen    COLONOSCOPY  12/12/2012    : NEGATIVE FOR EVIDENCE OF CELIAC DISEASE.  COLONOSCOPY N/A 04/22/2021    Dr Sriram Vo, 5 yr recall    HEMORRHOID SURGERY      THYROID SURGERY      Biopsy    THYROID SURGERY      nodule removed    TONSILLECTOMY      TUBAL LIGATION      UPPER GASTROINTESTINAL ENDOSCOPY  2008? ?     Dr Riojas Postin  02/12/2015    : Vinicio Vazquez neg    UPPER GASTROINTESTINAL ENDOSCOPY N/A 08/05/2019    Dr Dilcia Wade exam, NEG EoE       Social History:  Social History Tobacco Use    Smoking status: Never Smoker    Smokeless tobacco: Never Used   Vaping Use    Vaping Use: Never used   Substance Use Topics    Alcohol use: Yes     Comment: couple times per week    Drug use: No       Vital Signs: There were no vitals filed for this visit. Physical Exam:  Cardiac:  [x]WNL  []Comments:  Pulmonary:  [x]WNL   []Comments:  Neuro/Mental Status:  [x]WNL  []Comments:  Abdominal:  [x]WNL    []Comments:  Other:   []WNL  []Comments:    Informed Consent:  The risks and benefits of the procedure have been discussed with either the patient or if they cannot consent, their representative. Assessment:  Patient examined and appropriate for planned sedation and procedure. Plan:  Proceed with planned sedation and procedure as above. Andie Casillas am scribing for and in the presence of Dr. Edson Ramsey MD.  Electronically signed by Grecia Srinivasan RN on 4/21/2022 at 10:38 AM    I personally performed the services described in this documentation as scribed by Sanford Castellano, and it appears accurate and complete.      Edson Ramsey MD  4/21/2022

## 2022-04-26 ENCOUNTER — HOSPITAL ENCOUNTER (OUTPATIENT)
Dept: WOMENS IMAGING | Age: 57
Discharge: HOME | End: 2022-04-26
Payer: COMMERCIAL

## 2022-04-26 DIAGNOSIS — Z13.820 SCREENING FOR OSTEOPOROSIS: ICD-10-CM

## 2022-04-26 DIAGNOSIS — Z12.31 ENCOUNTER FOR SCREENING MAMMOGRAM FOR MALIGNANT NEOPLASM OF BREAST: ICD-10-CM

## 2022-04-26 PROCEDURE — 77063 BREAST TOMOSYNTHESIS BI: CPT

## 2022-04-29 ENCOUNTER — TELEPHONE (OUTPATIENT)
Dept: INTERNAL MEDICINE | Age: 57
End: 2022-04-29

## 2022-04-29 DIAGNOSIS — B37.31 YEAST INFECTION INVOLVING THE VAGINA AND SURROUNDING AREA: Primary | ICD-10-CM

## 2022-04-29 RX ORDER — FLUCONAZOLE 200 MG/1
200 TABLET ORAL DAILY
Qty: 3 TABLET | Refills: 0 | Status: SHIPPED | OUTPATIENT
Start: 2022-04-29 | End: 2022-05-02

## 2022-04-29 NOTE — TELEPHONE ENCOUNTER
Last weekend pt had some vaginal itching and tenderness more so around the clitoral area. She had been on an abx for perianal folliculitis. She only took about 1/2 of the abx because she started itching and thought she might be getting a yeast infection. This all started about last Sunday. She started on Vagisil cream  TID daily. She says when she 1st puts it on it burns but it stops the itching. She stopped using the hormone cream for a couple days when she started the Vagisil but did start back on the hormone cream. She denies any vaginal order, discharge, or swelling. She said the clitoral area did look a little red this morning and itches and is tender. TXU Char. Please advise.

## 2022-06-10 RX ORDER — ERGOCALCIFEROL 1.25 MG/1
CAPSULE ORAL
Qty: 12 CAPSULE | Refills: 3 | Status: SHIPPED | OUTPATIENT
Start: 2022-06-10

## 2022-08-12 ENCOUNTER — TELEPHONE (OUTPATIENT)
Dept: INTERNAL MEDICINE | Age: 57
End: 2022-08-12

## 2022-08-12 RX ORDER — DOXYCYCLINE HYCLATE 100 MG
100 TABLET ORAL 2 TIMES DAILY
Qty: 20 TABLET | Refills: 0 | Status: SHIPPED | OUTPATIENT
Start: 2022-08-12 | End: 2022-08-22

## 2022-08-12 RX ORDER — METHYLPREDNISOLONE 4 MG/1
TABLET ORAL
Qty: 1 KIT | Refills: 0 | Status: SHIPPED | OUTPATIENT
Start: 2022-08-12 | End: 2022-08-18

## 2022-09-06 ENCOUNTER — OFFICE VISIT (OUTPATIENT)
Dept: INTERNAL MEDICINE | Age: 57
End: 2022-09-06
Payer: COMMERCIAL

## 2022-09-06 VITALS
BODY MASS INDEX: 22.08 KG/M2 | OXYGEN SATURATION: 99 % | SYSTOLIC BLOOD PRESSURE: 124 MMHG | DIASTOLIC BLOOD PRESSURE: 64 MMHG | HEART RATE: 93 BPM | WEIGHT: 141 LBS

## 2022-09-06 DIAGNOSIS — R10.12 LEFT UPPER QUADRANT ABDOMINAL PAIN: ICD-10-CM

## 2022-09-06 DIAGNOSIS — F41.9 ANXIETY: ICD-10-CM

## 2022-09-06 DIAGNOSIS — R10.12 LEFT UPPER QUADRANT ABDOMINAL PAIN: Primary | ICD-10-CM

## 2022-09-06 LAB
ALBUMIN SERPL-MCNC: 4.6 G/DL (ref 3.5–5.2)
ALP BLD-CCNC: 92 U/L (ref 35–104)
ALT SERPL-CCNC: 13 U/L (ref 5–33)
AMYLASE: 74 U/L (ref 28–100)
ANION GAP SERPL CALCULATED.3IONS-SCNC: 9 MMOL/L (ref 7–19)
AST SERPL-CCNC: 18 U/L (ref 5–32)
BASOPHILS ABSOLUTE: 0 K/UL (ref 0–0.2)
BASOPHILS RELATIVE PERCENT: 0.6 % (ref 0–1)
BILIRUB SERPL-MCNC: 0.5 MG/DL (ref 0.2–1.2)
BUN BLDV-MCNC: 14 MG/DL (ref 6–20)
CALCIUM SERPL-MCNC: 9.8 MG/DL (ref 8.6–10)
CHLORIDE BLD-SCNC: 104 MMOL/L (ref 98–111)
CO2: 27 MMOL/L (ref 22–29)
CREAT SERPL-MCNC: 0.8 MG/DL (ref 0.5–0.9)
EOSINOPHILS ABSOLUTE: 0.1 K/UL (ref 0–0.6)
EOSINOPHILS RELATIVE PERCENT: 1.5 % (ref 0–5)
GFR AFRICAN AMERICAN: >59
GFR NON-AFRICAN AMERICAN: >60
GLUCOSE BLD-MCNC: 123 MG/DL (ref 74–109)
HCT VFR BLD CALC: 46.4 % (ref 37–47)
HEMOGLOBIN: 15.1 G/DL (ref 12–16)
IMMATURE GRANULOCYTES #: 0 K/UL
LIPASE: 38 U/L (ref 13–60)
LYMPHOCYTES ABSOLUTE: 1.4 K/UL (ref 1.1–4.5)
LYMPHOCYTES RELATIVE PERCENT: 28.2 % (ref 20–40)
MCH RBC QN AUTO: 29.7 PG (ref 27–31)
MCHC RBC AUTO-ENTMCNC: 32.5 G/DL (ref 33–37)
MCV RBC AUTO: 91.2 FL (ref 81–99)
MONOCYTES ABSOLUTE: 0.4 K/UL (ref 0–0.9)
MONOCYTES RELATIVE PERCENT: 7.7 % (ref 0–10)
NEUTROPHILS ABSOLUTE: 3 K/UL (ref 1.5–7.5)
NEUTROPHILS RELATIVE PERCENT: 62 % (ref 50–65)
PDW BLD-RTO: 13.2 % (ref 11.5–14.5)
PLATELET # BLD: 232 K/UL (ref 130–400)
PMV BLD AUTO: 10.2 FL (ref 9.4–12.3)
POTASSIUM SERPL-SCNC: 4.7 MMOL/L (ref 3.5–5)
RBC # BLD: 5.09 M/UL (ref 4.2–5.4)
SODIUM BLD-SCNC: 140 MMOL/L (ref 136–145)
TOTAL PROTEIN: 7.3 G/DL (ref 6.6–8.7)
WBC # BLD: 4.8 K/UL (ref 4.8–10.8)

## 2022-09-06 PROCEDURE — 99214 OFFICE O/P EST MOD 30 MIN: CPT | Performed by: NURSE PRACTITIONER

## 2022-09-06 RX ORDER — BUSPIRONE HYDROCHLORIDE 5 MG/1
5 TABLET ORAL 3 TIMES DAILY
Qty: 90 TABLET | Refills: 0 | Status: SHIPPED | OUTPATIENT
Start: 2022-09-06 | End: 2022-10-06

## 2022-09-06 ASSESSMENT — ENCOUNTER SYMPTOMS
TROUBLE SWALLOWING: 0
DIARRHEA: 0
COUGH: 0
VOMITING: 0
STRIDOR: 0
BLOOD IN STOOL: 0
EYE ITCHING: 0
SHORTNESS OF BREATH: 0
CONSTIPATION: 0
ABDOMINAL DISTENTION: 0
NAUSEA: 0
EYE DISCHARGE: 0
COLOR CHANGE: 0
CHOKING: 0
ABDOMINAL PAIN: 1
SORE THROAT: 0
WHEEZING: 0

## 2022-09-06 NOTE — PATIENT INSTRUCTIONS
1.  Left-sided abdominal pain. We will get labs today and CT of the abdomen. She is going to get the CT at [place]  2. Anxiety we will start BuSpar 5 mg 3 times a day you can increase up to 15 mg 3 times a day as needed without calling us. If that does not seem to help you at that point we will just need to change her medicine altogether. Again this is not something that you have to take every day all day long you can take it only as needed if you wish.

## 2022-09-12 DIAGNOSIS — R10.12 LEFT UPPER QUADRANT ABDOMINAL PAIN: ICD-10-CM

## 2022-09-13 ENCOUNTER — TELEPHONE (OUTPATIENT)
Dept: INTERNAL MEDICINE | Age: 57
End: 2022-09-13

## 2022-09-13 NOTE — TELEPHONE ENCOUNTER
Pt left message stating that she has decided to hold off on getting MRI done at this time . Pt is has appt in November , would like to wait until around then to see how she's feeling , and will make decision at that time .

## 2022-09-28 ENCOUNTER — TELEPHONE (OUTPATIENT)
Dept: INTERNAL MEDICINE | Age: 57
End: 2022-09-28

## 2022-09-28 NOTE — TELEPHONE ENCOUNTER
Pt called and stated that she had the  backed up and she was cleaning it yesterday. This morning she woke up with her eye sore and swollen, pt wanted something called in. This [place]. This [facility] explained that we can not send something in without looking at it first and suggested a walk in clinic to be evaluated. Pt stated understanding.

## 2022-11-03 DIAGNOSIS — E78.5 HYPERLIPIDEMIA, UNSPECIFIED HYPERLIPIDEMIA TYPE: ICD-10-CM

## 2022-11-03 DIAGNOSIS — E55.9 VITAMIN D DEFICIENCY: ICD-10-CM

## 2022-11-03 LAB
ALBUMIN SERPL-MCNC: 4.2 G/DL (ref 3.5–5.2)
ALP BLD-CCNC: 73 U/L (ref 35–104)
ALT SERPL-CCNC: 13 U/L (ref 5–33)
ANION GAP SERPL CALCULATED.3IONS-SCNC: 11 MMOL/L (ref 7–19)
AST SERPL-CCNC: 19 U/L (ref 5–32)
BASOPHILS ABSOLUTE: 0 K/UL (ref 0–0.2)
BASOPHILS RELATIVE PERCENT: 0.4 % (ref 0–1)
BILIRUB SERPL-MCNC: 0.5 MG/DL (ref 0.2–1.2)
BUN BLDV-MCNC: 9 MG/DL (ref 6–20)
CALCIUM SERPL-MCNC: 9.5 MG/DL (ref 8.6–10)
CHLORIDE BLD-SCNC: 104 MMOL/L (ref 98–111)
CHOLESTEROL, TOTAL: 211 MG/DL (ref 160–199)
CO2: 24 MMOL/L (ref 22–29)
CREAT SERPL-MCNC: 0.7 MG/DL (ref 0.5–0.9)
EOSINOPHILS ABSOLUTE: 0.1 K/UL (ref 0–0.6)
EOSINOPHILS RELATIVE PERCENT: 2 % (ref 0–5)
GFR SERPL CREATININE-BSD FRML MDRD: >60 ML/MIN/{1.73_M2}
GLUCOSE BLD-MCNC: 84 MG/DL (ref 74–109)
HCT VFR BLD CALC: 44.3 % (ref 37–47)
HDLC SERPL-MCNC: 101 MG/DL (ref 65–121)
HEMOGLOBIN: 14.2 G/DL (ref 12–16)
IMMATURE GRANULOCYTES #: 0 K/UL
LDL CHOLESTEROL CALCULATED: 98 MG/DL
LYMPHOCYTES ABSOLUTE: 1.2 K/UL (ref 1.1–4.5)
LYMPHOCYTES RELATIVE PERCENT: 26.3 % (ref 20–40)
MCH RBC QN AUTO: 30.1 PG (ref 27–31)
MCHC RBC AUTO-ENTMCNC: 32.1 G/DL (ref 33–37)
MCV RBC AUTO: 94.1 FL (ref 81–99)
MONOCYTES ABSOLUTE: 0.4 K/UL (ref 0–0.9)
MONOCYTES RELATIVE PERCENT: 9.1 % (ref 0–10)
NEUTROPHILS ABSOLUTE: 2.8 K/UL (ref 1.5–7.5)
NEUTROPHILS RELATIVE PERCENT: 62 % (ref 50–65)
PDW BLD-RTO: 13.5 % (ref 11.5–14.5)
PLATELET # BLD: 204 K/UL (ref 130–400)
PMV BLD AUTO: 10 FL (ref 9.4–12.3)
POTASSIUM SERPL-SCNC: 4.8 MMOL/L (ref 3.5–5)
RBC # BLD: 4.71 M/UL (ref 4.2–5.4)
SODIUM BLD-SCNC: 139 MMOL/L (ref 136–145)
TOTAL PROTEIN: 6.5 G/DL (ref 6.6–8.7)
TRIGL SERPL-MCNC: 62 MG/DL (ref 0–149)
TSH SERPL DL<=0.05 MIU/L-ACNC: 2.09 UIU/ML (ref 0.27–4.2)
VITAMIN D 25-HYDROXY: 33.6 NG/ML
WBC # BLD: 4.5 K/UL (ref 4.8–10.8)

## 2022-11-04 ENCOUNTER — OFFICE VISIT (OUTPATIENT)
Dept: INTERNAL MEDICINE | Age: 57
End: 2022-11-04
Payer: COMMERCIAL

## 2022-11-04 VITALS
DIASTOLIC BLOOD PRESSURE: 72 MMHG | HEIGHT: 67 IN | SYSTOLIC BLOOD PRESSURE: 118 MMHG | WEIGHT: 143 LBS | HEART RATE: 68 BPM | OXYGEN SATURATION: 98 % | BODY MASS INDEX: 22.44 KG/M2

## 2022-11-04 DIAGNOSIS — Z00.00 ROUTINE HEALTH MAINTENANCE: Primary | ICD-10-CM

## 2022-11-04 DIAGNOSIS — G89.29 CHRONIC LOW BACK PAIN, UNSPECIFIED BACK PAIN LATERALITY, UNSPECIFIED WHETHER SCIATICA PRESENT: ICD-10-CM

## 2022-11-04 DIAGNOSIS — Z78.0 MENOPAUSE: ICD-10-CM

## 2022-11-04 DIAGNOSIS — E03.9 HYPOTHYROIDISM, UNSPECIFIED TYPE: ICD-10-CM

## 2022-11-04 DIAGNOSIS — E78.5 HYPERLIPIDEMIA, UNSPECIFIED HYPERLIPIDEMIA TYPE: ICD-10-CM

## 2022-11-04 DIAGNOSIS — F41.9 ANXIETY DISORDER, UNSPECIFIED TYPE: ICD-10-CM

## 2022-11-04 DIAGNOSIS — M54.50 CHRONIC LOW BACK PAIN, UNSPECIFIED BACK PAIN LATERALITY, UNSPECIFIED WHETHER SCIATICA PRESENT: ICD-10-CM

## 2022-11-04 DIAGNOSIS — Z86.39 HISTORY OF THYROID NODULE: ICD-10-CM

## 2022-11-04 DIAGNOSIS — Z12.4 CERVICAL CANCER SCREENING: ICD-10-CM

## 2022-11-04 DIAGNOSIS — K21.9 GASTROESOPHAGEAL REFLUX DISEASE WITHOUT ESOPHAGITIS: ICD-10-CM

## 2022-11-04 DIAGNOSIS — E55.9 VITAMIN D DEFICIENCY: ICD-10-CM

## 2022-11-04 PROCEDURE — 99396 PREV VISIT EST AGE 40-64: CPT | Performed by: INTERNAL MEDICINE

## 2022-11-04 PROCEDURE — 90674 CCIIV4 VAC NO PRSV 0.5 ML IM: CPT | Performed by: INTERNAL MEDICINE

## 2022-11-04 PROCEDURE — 90471 IMMUNIZATION ADMIN: CPT | Performed by: INTERNAL MEDICINE

## 2022-11-04 RX ORDER — TIZANIDINE 4 MG/1
4 TABLET ORAL EVERY 6 HOURS PRN
Qty: 60 TABLET | Refills: 0 | Status: SHIPPED | OUTPATIENT
Start: 2022-11-04

## 2022-11-04 RX ORDER — BUSPIRONE HYDROCHLORIDE 5 MG/1
5 TABLET ORAL 2 TIMES DAILY PRN
COMMUNITY

## 2022-11-04 NOTE — PROGRESS NOTES
Chief Complaint   Patient presents with    Annual Exam       HPI: Juan Boss is a 65 y.o. female is here for annual physical exam.  Her functional status is good. She has no history of falls. She has no concerns in regards to safety, hearing, or cognition. She is not seeing any other healthcare providers on a regular basis. She still has left upper quadrant abdominal pain at times. She has had extensive work-up. She does play pickle ball regularly. She states that she gets pretty aggressive when she plays pickle ball. She thinks that she may have just pulled a muscle. She plans to work on diet and exercise. She does have some low back pain at times. It does radiate to the left groin. She still has difficulty with hot flashes. She is using compounded creams which do help some. Sometimes, she has some trouble sleeping. Prevacid is okay for acid reflux. Her anxiety is relatively stable. Her thyroid function has remained within normal limits. Her allergies are stable on her current dose of Claritin.     Routine screening is as follows:  Eye exam yearly  Flu Vaccine yearly  Pap Done today  Mammogram April 2022  Colonoscopy 2021  DEXA April 2022    Past Medical History:   Diagnosis Date    Acid reflux     Anemia     BRCA negative     Chronic back pain     GERD (gastroesophageal reflux disease)     Hypothyroidism     Irritable bowel syndrome     Premature atrial contraction     Skipped heart beats     Thyroid disease     Thyroid mass     Vitamin D deficiency       Past Surgical History:   Procedure Laterality Date    BREAST BIOPSY Right 2012    benign    BREAST ENHANCEMENT SURGERY Bilateral 1999    saline,retro-pectoral    BREAST SURGERY      Augmentation,Biopsy+    CHOLECYSTECTOMY      COLONOSCOPY  05/25/2005    Dr Elizabeth Soares, no active colitis    COLONOSCOPY  12/12/2012    Dr Corona [place] colon    COLONOSCOPY N/A 04/22/2021    Dr Alfonso Mayo, 5 yr recall    HEMORRHOID SURGERY      THYROID SURGERY      Biopsy    THYROID SURGERY      nodule removed    TONSILLECTOMY      TUBAL LIGATION      UPPER GASTROINTESTINAL ENDOSCOPY  2005    Dr Ernst Luis celiac, gastritis    UPPER GASTROINTESTINAL ENDOSCOPY  2015    Dr Dillard Stage neg, mild antral gastritis    UPPER GASTROINTESTINAL ENDOSCOPY N/A 2019    Dr Jay Carpenter exam, NEG EoE    UPPER GASTROINTESTINAL ENDOSCOPY  2022    Dr Kaylyn Escamilla-Esophagitis vs Guaman's, gastritis    UPPER GASTROINTESTINAL ENDOSCOPY  2012    Dr Dillard Stage neg, antral gastritis, no celiac    UPPER GASTROINTESTINAL ENDOSCOPY N/A 2022    Dr Kaylyn Escamilla-Gastritis, esophagitis, no h pylori      Social History     Socioeconomic History    Marital status:    Tobacco Use    Smoking status: Never    Smokeless tobacco: Never   Vaping Use    Vaping Use: Never used   Substance and Sexual Activity    Alcohol use: Yes     Comment: couple times per week    Drug use: No    Sexual activity: Yes     Partners: Male     Birth control/protection: Surgical   Social History Narrative    ** Merged History Encounter **          Social Determinants of Health     Financial Resource Strain: Low Risk     Difficulty of Paying Living Expenses: Not hard at all   Food Insecurity: No Food Insecurity    Worried About Running Out of Food in the Last Year: Never true    Ran Out of Food in the Last Year: Never true   Transportation Needs: No Transportation Needs    Lack of Transportation (Medical): No    Lack of Transportation (Non-Medical): No      Family History   Problem Relation Age of Onset    Other Mother         brain bleed    Dementia Mother     High Blood Pressure Mother     Lung Cancer Father          54, lung cancer    Cancer Father         Lung-Smoker    Breast Cancer Sister 50         at 47, did not wake up after a nap    Heart Attack Sister     Other Other         brain aneurysm    Cancer Maternal Grandfather         rectal cancer    Colon Cancer Maternal Grandfather     Colon Polyps Maternal Grandfather     Esophageal Cancer Neg Hx     Liver Cancer Neg Hx     Liver Disease Neg Hx     Rectal Cancer Neg Hx     Stomach Cancer Neg Hx         Current Outpatient Medications   Medication Sig Dispense Refill    busPIRone (BUSPAR) 5 MG tablet Take 5 mg by mouth 2 times daily as needed      tiZANidine (ZANAFLEX) 4 MG tablet Take 1 tablet by mouth every 6 hours as needed (muscle spasms) 60 tablet 0    vitamin D (ERGOCALCIFEROL) 1.25 MG (11396 UT) CAPS capsule TAKE ONE CAPSULE BY MOUTH ONCE A WEEK 12 capsule 3    levothyroxine (SYNTHROID) 75 MCG tablet TAKE ONE TABLET ONE TIME DAILY 90 tablet 3    lansoprazole (PREVACID) 30 MG delayed release capsule TAKE ONE CAPSULE TWO TIMES A  capsule 3    MISC NATURAL PRODUCTS EX Apply topically [place] hormone compound nightly      sucralfate (CARAFATE) 1 GM/10ML suspension Take 10 mLs by mouth 4 times daily (Patient taking differently: Take 1 g by mouth as needed) 1200 mL 3    Triamcinolone Acetonide (NASACORT ALLERGY 24HR NA) by Nasal route daily as needed      conjugated estrogens (PREMARIN) 0.625 MG/GM vaginal cream Place 0.5 g vaginally daily 1 Tube 3    loratadine (CLARITIN) 10 MG capsule Take 10 mg by mouth daily      Alum Hydroxide-Mag Carbonate (GAVISCON PO) Take by mouth nightly        No current facility-administered medications for this visit.         Patient Active Problem List   Diagnosis    LLQ abdominal pain    Chronic constipation    Epigastric pressure    Heartburn    Indigestion    Acid reflux    Esophageal spasm    Hemorrhoids    Chest pain    Breast tenderness in female    Breast mass, left    History of breast augmentation    Iron deficiency anemia secondary to inadequate dietary iron intake    Other fatigue    Close exposure to COVID-19 virus    Chronic heartburn    Dyspepsia    Nausea    Esophageal pain    Left upper quadrant abdominal pain    Anxiety        Review of Systems Constitutional:  Negative for activity change, appetite change, chills, diaphoresis, fatigue, fever and unexpected weight change. HENT:  Negative for congestion, ear pain, facial swelling, hearing loss, mouth sores, nosebleeds, postnasal drip, rhinorrhea, sinus pressure, sneezing, sore throat, tinnitus, trouble swallowing and voice change. Eyes:  Negative for photophobia, pain, discharge, redness, itching and visual disturbance. Respiratory:  Negative for cough, choking, chest tightness, shortness of breath and wheezing. Cardiovascular:  Negative for chest pain, palpitations and leg swelling. Gastrointestinal:  Negative for abdominal distention, abdominal pain, anal bleeding, blood in stool, constipation, diarrhea, nausea, rectal pain and vomiting. Esophageal spasms/acid reflux is improving. Upper quadrant abdominal pain at times. She attributes this to muscle pain. Endocrine: Negative for cold intolerance, heat intolerance, polydipsia, polyphagia and polyuria. Genitourinary:  Negative for decreased urine volume, difficulty urinating, dysuria, flank pain, frequency, hematuria and urgency. Musculoskeletal:  Positive for arthralgias and back pain. Negative for gait problem, joint swelling, myalgias, neck pain and neck stiffness. Skin:  Negative for color change, pallor and rash. Complains of soreness and irritation around the rectal site. Allergic/Immunologic: Negative for environmental allergies and food allergies. Neurological:  Negative for dizziness, tremors, syncope, weakness, light-headedness, numbness and headaches. Hematological:  Negative for adenopathy. Does not bruise/bleed easily. Psychiatric/Behavioral:  Negative for agitation, behavioral problems, confusion, decreased concentration, dysphoric mood, hallucinations, self-injury, sleep disturbance and suicidal ideas. The patient is not nervous/anxious and is not hyperactive.       /72 (Site: Left Upper Arm, Position: Sitting, Cuff Size: Medium Adult)   Pulse 68   Ht 5' 6.5\" (1.689 m)   Wt 143 lb (64.9 kg)   SpO2 98%   BMI 22.74 kg/m²   Physical Exam  Vitals and nursing note reviewed. Exam conducted with a chaperone present. Constitutional:       General: She is not in acute distress. Appearance: Normal appearance. She is normal weight. She is not ill-appearing, toxic-appearing or diaphoretic. HENT:      Head: Normocephalic and atraumatic. Right Ear: Tympanic membrane, ear canal and external ear normal. There is no impacted cerumen. Left Ear: Tympanic membrane, ear canal and external ear normal. There is no impacted cerumen. Nose: Nose normal. No congestion or rhinorrhea. Mouth/Throat:      Mouth: Mucous membranes are moist.      Pharynx: Oropharynx is clear. No oropharyngeal exudate or posterior oropharyngeal erythema. Eyes:      General: No scleral icterus. Right eye: No discharge. Left eye: No discharge. Extraocular Movements: Extraocular movements intact. Conjunctiva/sclera: Conjunctivae normal.      Pupils: Pupils are equal, round, and reactive to light. Neck:      Vascular: No carotid bruit. Cardiovascular:      Rate and Rhythm: Normal rate and regular rhythm. Pulses: Normal pulses. Heart sounds: Normal heart sounds. No murmur heard. No friction rub. No gallop. Pulmonary:      Effort: Pulmonary effort is normal. No respiratory distress. Breath sounds: Normal breath sounds. No stridor. No wheezing, rhonchi or rales. Chest:      Chest wall: No tenderness. Abdominal:      General: There is no distension. Tenderness: There is no abdominal tenderness. There is no guarding. Hernia: A hernia is present. There is no hernia in the left inguinal area or right inguinal area. Genitourinary:     Exam position: Lithotomy position. Labia:         Right: No rash, tenderness, lesion or injury.          Left: No rash, tenderness, lesion or injury. Urethra: No prolapse, urethral pain, urethral swelling or urethral lesion. Vagina: Normal.      Cervix: Normal and dilated. Uterus: Normal.       Adnexa: Right adnexa normal and left adnexa normal.      Comments: Breasts: breasts appear normal, no suspicious masses, no skin or nipple changes or axillary nodes   Musculoskeletal:         General: Tenderness present. No swelling, deformity or signs of injury. Cervical back: Normal range of motion and neck supple. No rigidity. No muscular tenderness. Right lower leg: No edema. Left lower leg: No edema. Comments: There is some tenderness on palpation of the right hip. Lymphadenopathy:      Cervical: No cervical adenopathy. Lower Body: No right inguinal adenopathy. No left inguinal adenopathy. Skin:     General: Skin is warm and dry. Capillary Refill: Capillary refill takes less than 2 seconds. Neurological:      General: No focal deficit present. Mental Status: She is alert and oriented to person, place, and time. Mental status is at baseline. Psychiatric:         Mood and Affect: Mood normal.         Behavior: Behavior normal.         Thought Content: Thought content normal.         Judgment: Judgment normal.       1. Routine health maintenance    2. Vitamin D deficiency    3. Hyperlipidemia, unspecified hyperlipidemia type    4. History of thyroid nodule    5. Cervical cancer screening    6. Anxiety disorder, unspecified type    7. Chronic low back pain, unspecified back pain laterality, unspecified whether sciatica present    8. Hypothyroidism, unspecified type    9. Gastroesophageal reflux disease without esophagitis    10. Menopause        ASSESSMENT/PLAN:    61-year-old woman here for follow-up    1. Health maintenance: Routine screening is as per HPI. Labs discussed with patient today    2. Anxiety: BuSpar as needed    3. Low back pain: Zanaflex as needed    4.   Vitamin D deficiency: Continue ergocalciferol as prescribed    5. Hypothyroidism: Continue levothyroxine at current dose    6. Acid reflux: Continue Prevacid Carafate as needed    7. Menopause: Continue Premarin as prescribed    8. History of thyroid nodule: Thyroid ultrasound ordered    9. Hyperlipidemia: Check lipid panel with next lab draw        Kalee Nelson was seen today for annual exam.    Diagnoses and all orders for this visit:    Routine health maintenance    Vitamin D deficiency  -     Vitamin D 25 Hydroxy; Future    Hyperlipidemia, unspecified hyperlipidemia type  -     TSH; Future  -     Lipid Panel; Future  -     Comprehensive Metabolic Panel; Future  -     CBC with Auto Differential; Future  -     Vitamin D 25 Hydroxy; Future    History of thyroid nodule  -     US THYROID; Future    Cervical cancer screening  -     PAP SMEAR    Anxiety disorder, unspecified type    Chronic low back pain, unspecified back pain laterality, unspecified whether sciatica present    Hypothyroidism, unspecified type    Gastroesophageal reflux disease without esophagitis    Menopause    Other orders  -     tiZANidine (ZANAFLEX) 4 MG tablet; Take 1 tablet by mouth every 6 hours as needed (muscle spasms)  -     Influenza, FLUCELVAX, (age 10 mo+), IM, Preservative Free, 0.5 mL        Return in about 6 months (around 5/4/2023). Orders Placed This Encounter   Procedures    US THYROID     This procedure can be scheduled via [employer]. Access your [employer] account by visiting [email or web address].      Standing Status:   Future     Standing Expiration Date:   11/4/2023     Order Specific Question:   Reason for exam:     Answer:   thyroid nodule    Influenza, FLUCELVAX, (age 10 mo+), IM, Preservative Free, 0.5 mL    TSH     Standing Status:   Future     Standing Expiration Date:   11/4/2023    Lipid Panel     Standing Status:   Future     Standing Expiration Date:   11/4/2023    Comprehensive Metabolic Panel     Fasting 12 hours     Standing Status:   Future Standing Expiration Date:   11/4/2023    CBC with Auto Differential     Standing Status:   Future     Standing Expiration Date:   11/4/2023    Vitamin D 25 Hydroxy     Standing Status:   Future     Standing Expiration Date:   11/4/2023    PAP SMEAR     Patient History:    No LMP recorded. OBGYN Status: Having periods  Past Surgical History:  2012: BREAST BIOPSY; Right      Comment:  benign  1999: BREAST ENHANCEMENT SURGERY; Bilateral      Comment:  saline,retro-pectoral  No date: BREAST SURGERY      Comment:  Augmentation,Biopsy+  No date: CHOLECYSTECTOMY  05/25/2005: COLONOSCOPY      Comment:  Dr Hailey Cheema, no active colitis  12/12/2012: COLONOSCOPY      Comment:  Dr Jami Morin colon  04/22/2021: COLONOSCOPY; N/A      Comment:  Dr Maksim Choi, 5 yr recall  No date: HEMORRHOID SURGERY  No date: THYROID SURGERY      Comment:  Biopsy  No date: THYROID SURGERY      Comment:  nodule removed  No date: TONSILLECTOMY  No date: TUBAL LIGATION  05/25/2005: UPPER GASTROINTESTINAL ENDOSCOPY      Comment:  Dr Serjio Mckeon celiac, gastritis  02/12/2015: UPPER GASTROINTESTINAL ENDOSCOPY      Comment:  Dr Pérez Richard neg, mild antral gastritis  08/05/2019: UPPER GASTROINTESTINAL ENDOSCOPY; N/A      Comment:  Dr Davi Ratliff exam, NEG EoE  04/21/2022: UPPER GASTROINTESTINAL ENDOSCOPY      Comment:  Dr Eh Escamilla-Esophagitis vs Guaman's, gastritis  12/18/2012: UPPER GASTROINTESTINAL ENDOSCOPY      Comment:  Dr Pérez Richard neg, antral gastritis, no celiac  04/21/2022: UPPER GASTROINTESTINAL ENDOSCOPY; N/A      Comment:  Dr Eh Escamilla-Gastritis, esophagitis, no h pylori      Social History    Tobacco Use      Smoking status: Never      Smokeless tobacco: Never       Order Specific Question:   Collection Type     Answer: Thin Prep     Order Specific Question:   Prior Abnormal Pap Test     Answer:   No     Order Specific Question:   Screening or Diagnostic     Answer:   Screening     Order Specific Question:   HPV Requested? Answer:   Yes - If Abnormal Reflex HPV     Order Specific Question:   High Risk Patient     Answer:   Eduardo Cunha MD

## 2022-11-12 ASSESSMENT — ENCOUNTER SYMPTOMS
SHORTNESS OF BREATH: 0
ABDOMINAL PAIN: 0
BACK PAIN: 1
EYE REDNESS: 0
NAUSEA: 0
WHEEZING: 0
RECTAL PAIN: 0
EYE DISCHARGE: 0
VOICE CHANGE: 0
DIARRHEA: 0
TROUBLE SWALLOWING: 0
BLOOD IN STOOL: 0
SORE THROAT: 0
ABDOMINAL DISTENTION: 0
EYE PAIN: 0
COUGH: 0
PHOTOPHOBIA: 0
CHEST TIGHTNESS: 0
VOMITING: 0
COLOR CHANGE: 0
ANAL BLEEDING: 0
CONSTIPATION: 0
FACIAL SWELLING: 0
EYE ITCHING: 0
RHINORRHEA: 0
CHOKING: 0
SINUS PRESSURE: 0

## 2022-11-17 ENCOUNTER — HOSPITAL ENCOUNTER (OUTPATIENT)
Dept: ULTRASOUND IMAGING | Age: 57
Discharge: HOME | End: 2022-11-17
Payer: COMMERCIAL

## 2022-11-17 DIAGNOSIS — Z86.39 HISTORY OF THYROID NODULE: ICD-10-CM

## 2022-11-17 PROCEDURE — 76536 US EXAM OF HEAD AND NECK: CPT

## 2022-12-07 ENCOUNTER — OFFICE VISIT (OUTPATIENT)
Dept: INTERNAL MEDICINE | Age: 57
End: 2022-12-07
Payer: COMMERCIAL

## 2022-12-07 ENCOUNTER — TRANSCRIBE ORDERS (OUTPATIENT)
Dept: ADMINISTRATIVE | Facility: HOSPITAL | Age: 57
End: 2022-12-07

## 2022-12-07 VITALS
HEART RATE: 74 BPM | SYSTOLIC BLOOD PRESSURE: 124 MMHG | DIASTOLIC BLOOD PRESSURE: 70 MMHG | OXYGEN SATURATION: 96 % | TEMPERATURE: 98 F | BODY MASS INDEX: 22.74 KG/M2 | WEIGHT: 143 LBS

## 2022-12-07 DIAGNOSIS — N64.4 BREAST PAIN: Primary | ICD-10-CM

## 2022-12-07 DIAGNOSIS — N64.4 BREAST PAIN, RIGHT: ICD-10-CM

## 2022-12-07 PROCEDURE — 99213 OFFICE O/P EST LOW 20 MIN: CPT | Performed by: NURSE PRACTITIONER

## 2022-12-07 ASSESSMENT — ENCOUNTER SYMPTOMS
EYE DISCHARGE: 0
STRIDOR: 0
TROUBLE SWALLOWING: 0
EYE ITCHING: 0
BLOOD IN STOOL: 0
SORE THROAT: 0
COUGH: 0
DIARRHEA: 0
ABDOMINAL PAIN: 0
COLOR CHANGE: 0
SHORTNESS OF BREATH: 0
NAUSEA: 0
CONSTIPATION: 0
ABDOMINAL DISTENTION: 0
VOMITING: 0
WHEEZING: 0
CHOKING: 0

## 2022-12-07 NOTE — PROGRESS NOTES
[facility] PHYSICIAN SERVICES  [facility] INTERNAL MEDICINE  [address] [facility] [address] [address]  Dept: [phone]  Dept Fax: [phone]: [phone]    Sandrine Manning (:  1965) is a 65 y.o. female,Established patient  with jasmyne, here for evaluation of the following chief complaint(s): Breast Pain (Red and tender on right breast , states she had marker 10 years ago it is around that area . )      Sandrine Manning is a 65 y.o. female who presents today for her medical conditions/complaints as noted below. Sandrine Manning is c/bia Breast Pain (Red and tender on right breast , states she had marker 10 years ago it is around that area . )        HPI:     Chief Complaint   Patient presents with    Breast Pain     Red and tender on right breast , states she had marker 10 years ago it is around that area . HPI    Left breast lump and soreness;  she did play pickle ball yesterday, but doesn't remember any injury   she noticed soreness that evening in the shower this am there is a little bit of redness at 11-12 o'clock;         Past Medical History:   Diagnosis Date    Acid reflux     Anemia     BRCA negative     Chronic back pain     GERD (gastroesophageal reflux disease)     Hypothyroidism     Irritable bowel syndrome     Premature atrial contraction     Skipped heart beats     Thyroid disease     Thyroid mass     Vitamin D deficiency       Past Surgical History:   Procedure Laterality Date    BREAST BIOPSY Right 2012    benign    BREAST ENHANCEMENT SURGERY Bilateral     saline,retro-pectoral    BREAST SURGERY      Augmentation,Biopsy+    CHOLECYSTECTOMY      COLONOSCOPY  2005    Dr Graham Sep, no active colitis    COLONOSCOPY  2012    Dr Mooney Durkee colon    COLONOSCOPY N/A 2021    Dr Chel Damian, 5 yr recall    HEMORRHOID SURGERY      THYROID SURGERY      Biopsy    THYROID SURGERY      nodule removed    TONSILLECTOMY      TUBAL LIGATION      UPPER GASTROINTESTINAL ENDOSCOPY  2005    Dr Jesu De La Cruz celiac, gastritis    UPPER GASTROINTESTINAL ENDOSCOPY  2015    Dr Royal Friedman neg, mild antral gastritis    UPPER GASTROINTESTINAL ENDOSCOPY N/A 2019    Dr Oscar Beltran exam, NEG EoE    UPPER GASTROINTESTINAL ENDOSCOPY  2022    Dr Castro Escamilla-Esophagitis vs Guaman's, gastritis    UPPER GASTROINTESTINAL ENDOSCOPY  2012    Dr Royal Friedman neg, antral gastritis, no celiac    UPPER GASTROINTESTINAL ENDOSCOPY N/A 2022    Dr Castro Escamilla-Gastritis, esophagitis, no h pylori       Vitals 2022   SYSTOLIC 711 155 528 175 446 -   DIASTOLIC 70 72 64 81 69 -   Site - Left Upper Arm - - - -   Position - Sitting - - - -   Cuff Size - Medium Adult - - - -   Pulse 74 68 93 57 61 -   Temp 98 - - - 97.5 -   Resp - - - 20 20 -   SpO2 96 98 99 98 99 98   Weight 143 lb 143 lb 141 lb - - -   Height - 5' 6.5\" - - - -   Body mass index - 22.73 kg/m2 - - - -   Some recent data might be hidden       Family History   Problem Relation Age of Onset    Other Mother         brain bleed    Dementia Mother     High Blood Pressure Mother     Lung Cancer Father          54, lung cancer    Cancer Father         Lung-Smoker    Breast Cancer Sister 50         at 47, did not wake up after a nap    Heart Attack Sister     Other Other         brain aneurysm    Cancer Maternal Grandfather         rectal cancer    Colon Cancer Maternal Grandfather     Colon Polyps Maternal Grandfather     Esophageal Cancer Neg Hx     Liver Cancer Neg Hx     Liver Disease Neg Hx     Rectal Cancer Neg Hx     Stomach Cancer Neg Hx        Social History     Tobacco Use    Smoking status: Never    Smokeless tobacco: Never   Substance Use Topics    Alcohol use: Yes     Comment: couple times per week      Current Outpatient Medications   Medication Sig Dispense Refill    busPIRone (BUSPAR) 5 MG tablet Take 5 mg by mouth 2 times daily as needed      tiZANidine (ZANAFLEX) 4 MG tablet Take 1 tablet by mouth every 6 hours as needed (muscle spasms) 60 tablet 0    vitamin D (ERGOCALCIFEROL) 1.25 MG (76970 UT) CAPS capsule TAKE ONE CAPSULE BY MOUTH ONCE A WEEK 12 capsule 3    levothyroxine (SYNTHROID) 75 MCG tablet TAKE ONE TABLET ONE TIME DAILY 90 tablet 3    lansoprazole (PREVACID) 30 MG delayed release capsule TAKE ONE CAPSULE TWO TIMES A  capsule 3    MISC NATURAL PRODUCTS EX Apply topically [place] [employer] hormone compound nightly      sucralfate (CARAFATE) 1 GM/10ML suspension Take 10 mLs by mouth 4 times daily (Patient taking differently: Take 1 g by mouth as needed) 1200 mL 3    Triamcinolone Acetonide (NASACORT ALLERGY 24HR NA) by Nasal route daily as needed      conjugated estrogens (PREMARIN) 0.625 MG/GM vaginal cream Place 0.5 g vaginally daily 1 Tube 3    loratadine (CLARITIN) 10 MG capsule Take 10 mg by mouth daily      Alum Hydroxide-Mag Carbonate (GAVISCON PO) Take by mouth nightly        No current facility-administered medications for this visit.      Allergies   Allergen Reactions    Celebrex [Celecoxib] Anaphylaxis       Health Maintenance   Topic Date Due    Shingles vaccine (1 of 2) Never done    COVID-19 Vaccine (4 - Booster) 12/03/2021    Depression Screen  04/07/2023    Breast cancer screen  04/26/2023    Cervical cancer screen  11/04/2025    Colorectal Cancer Screen  04/22/2026    Lipids  11/03/2027    DTaP/Tdap/Td vaccine (2 - Td or Tdap) 03/21/2032    Flu vaccine  Completed    Hepatitis A vaccine  Aged Out    Hib vaccine  Aged Out    Meningococcal (ACWY) vaccine  Aged Out    Pneumococcal 0-64 years Vaccine  Aged Out    Hepatitis C screen  Discontinued    HIV screen  Discontinued       Lab Results   Component Value Date    LABA1C 4.7 02/02/2021     No results found for: PSA, PSADIA  TSH   Date Value Ref Range Status   11/03/2022 2.090 0.270 - 4.200 uIU/mL Final   ]  Lab Results   Component Value Date     11/03/2022    K 4.8 11/03/2022     11/03/2022    CO2 24 11/03/2022    BUN 9 11/03/2022    CREATININE 0.7 11/03/2022    GLUCOSE 84 11/03/2022    CALCIUM 9.5 11/03/2022    PROT 6.5 (L) 11/03/2022    LABALBU 4.2 11/03/2022    BILITOT 0.5 11/03/2022    ALKPHOS 73 11/03/2022    AST 19 11/03/2022    ALT 13 11/03/2022    LABGLOM >60 11/03/2022    GFRAA >59 09/06/2022     Lab Results   Component Value Date    CHOL 211 (H) 11/03/2022    CHOL 215 (H) 04/05/2022    CHOL 241 (H) 08/24/2021     Lab Results   Component Value Date    TRIG 62 11/03/2022    TRIG 83 04/05/2022    TRIG 71 08/24/2021     Lab Results   Component Value Date     11/03/2022     04/05/2022     08/24/2021     Lab Results   Component Value Date    LDLCALC 98 11/03/2022    LDLCALC 97 04/05/2022    LDLCALC 107 08/24/2021     Lab Results   Component Value Date/Time     11/03/2022 08:25 AM    K 4.8 11/03/2022 08:25 AM     11/03/2022 08:25 AM    CO2 24 11/03/2022 08:25 AM    BUN 9 11/03/2022 08:25 AM    CREATININE 0.7 11/03/2022 08:25 AM    GLUCOSE 84 11/03/2022 08:25 AM    CALCIUM 9.5 11/03/2022 08:25 AM      Lab Results   Component Value Date    WBC 4.5 (L) 11/03/2022    HGB 14.2 11/03/2022    HCT 44.3 11/03/2022    MCV 94.1 11/03/2022     11/03/2022    LABLYMP 1.36 02/22/2013    LYMPHOPCT 26.3 11/03/2022    RBC 4.71 11/03/2022    MCH 30.1 11/03/2022    MCHC 32.1 (L) 11/03/2022    RDW 13.5 11/03/2022     Lab Results   Component Value Date    VITD25 33.6 11/03/2022       Diagnostics reviewed from today   Subjective:      Review of Systems   Constitutional:  Negative for fatigue, fever and unexpected weight change. HENT:  Negative for ear discharge, ear pain, mouth sores, sore throat and trouble swallowing. Eyes:  Negative for discharge, itching and visual disturbance. Respiratory:  Negative for cough, choking, shortness of breath, wheezing and stridor.     Cardiovascular:  Negative for chest pain, palpitations and leg swelling. Gastrointestinal:  Negative for abdominal distention, abdominal pain, blood in stool, constipation, diarrhea, nausea and vomiting. Endocrine: Negative for cold intolerance, polydipsia and polyuria. Genitourinary:  Negative for difficulty urinating, dysuria, frequency and urgency. Musculoskeletal:  Negative for arthralgias and gait problem. Skin:  Negative for color change and rash. Allergic/Immunologic: Negative for food allergies and immunocompromised state. Neurological:  Negative for dizziness, tremors, syncope, speech difficulty, weakness and headaches. Hematological:  Negative for adenopathy. Does not bruise/bleed easily. Psychiatric/Behavioral:  Negative for confusion and hallucinations. Objective:     Physical Exam  Constitutional:       General: She is not in acute distress. Appearance: She is well-developed. HENT:      Head: Normocephalic and atraumatic. Eyes:      General: No scleral icterus. Right eye: No discharge. Left eye: No discharge. Pupils: Pupils are equal, round, and reactive to light. Neck:      Thyroid: No thyromegaly. Vascular: No JVD. Cardiovascular:      Rate and Rhythm: Normal rate and regular rhythm. Heart sounds: Normal heart sounds. No murmur heard. Pulmonary:      Effort: Pulmonary effort is normal. No respiratory distress. Breath sounds: Normal breath sounds. No wheezing or rales. Chest:          Comments: Black is the area palpated mass  (linear ) maybe hematoma   2 reddened soft areas on the breast right above the mass;  it is very tender to touch;  she has had a previous biopsy on that breast with a marker in place   Abdominal:      General: Bowel sounds are normal. There is no distension. Palpations: Abdomen is soft. There is no mass. Tenderness: There is no abdominal tenderness. There is no guarding or rebound. Musculoskeletal:         General: No tenderness.  Normal range of motion. Cervical back: Normal range of motion and neck supple. Skin:     General: Skin is warm and dry. Findings: No erythema or rash. Neurological:      Mental Status: She is alert and oriented to person, place, and time. Cranial Nerves: No cranial nerve deficit. Coordination: Coordination normal.      Deep Tendon Reflexes: Reflexes are normal and symmetric. Reflexes normal.   Psychiatric:         Mood and Affect: Mood is not depressed. Behavior: Behavior normal.         Thought Content: Thought content normal.         Judgment: Judgment normal.     /70   Pulse 74   Temp 98 °F (36.7 °C)   Wt 143 lb (64.9 kg)   SpO2 96%   BMI 22.74 kg/m²           Assessment:      Problem List       Breast pain, right     We will try to get u/s of right breast;            Relevant Orders    US BREAST COMPLETE RIGHT       Plan:        Patient given educational materials - see patient instructions. Discussed use, benefit, and side effects of prescribed medications. Allpatient questions answered. Pt voiced understanding. Reviewed health maintenance. Instructed to continue current medications, diet and exercise. Patient agreed with treatment plan. Follow up as directed. MEDICATIONS:  No orders of the defined types were placed in this encounter. ORDERS:  Orders Placed This Encounter   Procedures    US BREAST COMPLETE RIGHT         Follow-up:  No follow-ups on file. PATIENT INSTRUCTIONS:  Patient Instructions    Right breast mass;  tender, redness;  try to get u/s today; We will call you later with the results   We were unable to get a diagnostic scan with ultrasound at [facility] before the end of February. So we will try [facility] we could not get that scheduled until the end of this month. Will do just the ultrasound without diagnostic mammogram.  So discussed with Krupa Boothe and she will just use warm moist compresses over the next few days.   If it worsens we may try to get just the ultrasound with ruthie or talk to Dr. Sami Simmons directly. Electronically signed by VERA Houston on 12/7/2022 at 2:12 PM    @    Billy/transcription disclaimer:  Much of this encounter note is electronic transcription/translation of spoken language to printed texts. The electronic translation of spoken language may be erroneous, or at times,nonsensical words or phrases may be inadvertently transcribed.   Although I have reviewed the note for such errors, some may still exist.

## 2022-12-14 ENCOUNTER — HOSPITAL ENCOUNTER (OUTPATIENT)
Dept: WOMENS IMAGING | Age: 57
Discharge: HOME | End: 2022-12-14
Payer: COMMERCIAL

## 2022-12-14 DIAGNOSIS — N64.4 BREAST PAIN: ICD-10-CM

## 2022-12-14 DIAGNOSIS — N63.10 MASS OF RIGHT BREAST, UNSPECIFIED QUADRANT: ICD-10-CM

## 2022-12-14 DIAGNOSIS — N64.4 BREAST PAIN, RIGHT: ICD-10-CM

## 2022-12-14 PROCEDURE — 77065 DX MAMMO INCL CAD UNI: CPT

## 2022-12-14 PROCEDURE — 76642 ULTRASOUND BREAST LIMITED: CPT

## 2023-01-12 ENCOUNTER — TELEPHONE (OUTPATIENT)
Dept: INTERNAL MEDICINE | Age: 58
End: 2023-01-12

## 2023-01-12 DIAGNOSIS — R11.2 NAUSEA AND VOMITING, UNSPECIFIED VOMITING TYPE: Primary | ICD-10-CM

## 2023-01-12 DIAGNOSIS — R10.9 ABDOMINAL PAIN, UNSPECIFIED ABDOMINAL LOCATION: ICD-10-CM

## 2023-01-12 DIAGNOSIS — R14.0 BLOATING: ICD-10-CM

## 2023-01-12 RX ORDER — ONDANSETRON 4 MG/1
4 TABLET, FILM COATED ORAL EVERY 6 HOURS PRN
Qty: 20 TABLET | Refills: 0 | Status: SHIPPED | OUTPATIENT
Start: 2023-01-12

## 2023-01-12 NOTE — TELEPHONE ENCOUNTER
Patient advised she was very, very sick last weekend. She believes she may have had food poisoning last week. She was throwing up more and harder than she ever has in her life. Her , (Dr. Carole Gonzalez) did evaluate her and advised she was probably vomiting from her small intestine. Her appetite is slowly coming back, but she was having a lot of bloating and abdominal tenderness after the vomiting episode resolved. She is having a CT scan at [facility] tomorrow, but with her history of GERD she would like for you to evaluate her as well. She states she can get bloodwork done in the morning before she goes to [facility] for her scan. I did work her in tomorrow morning at 1030. Please advise what, if any, labs you would like to pull prior to seeing her tomorrow.

## 2023-01-13 ENCOUNTER — TELEPHONE (OUTPATIENT)
Dept: INTERNAL MEDICINE | Age: 58
End: 2023-01-13

## 2023-01-13 ENCOUNTER — OFFICE VISIT (OUTPATIENT)
Dept: INTERNAL MEDICINE | Age: 58
End: 2023-01-13
Payer: COMMERCIAL

## 2023-01-13 VITALS
HEART RATE: 70 BPM | BODY MASS INDEX: 21.94 KG/M2 | DIASTOLIC BLOOD PRESSURE: 72 MMHG | HEIGHT: 67 IN | SYSTOLIC BLOOD PRESSURE: 118 MMHG | WEIGHT: 139.8 LBS | OXYGEN SATURATION: 98 %

## 2023-01-13 DIAGNOSIS — R11.2 NAUSEA AND VOMITING, UNSPECIFIED VOMITING TYPE: ICD-10-CM

## 2023-01-13 DIAGNOSIS — K52.9 GASTROENTERITIS: Primary | ICD-10-CM

## 2023-01-13 DIAGNOSIS — R10.9 ABDOMINAL PAIN, UNSPECIFIED ABDOMINAL LOCATION: ICD-10-CM

## 2023-01-13 DIAGNOSIS — R14.0 BLOATING: ICD-10-CM

## 2023-01-13 PROBLEM — E04.1 THYROID NODULE: Status: ACTIVE | Noted: 2021-07-27

## 2023-01-13 PROBLEM — I49.1 ATRIAL PREMATURE COMPLEX: Status: ACTIVE | Noted: 2021-07-27

## 2023-01-13 PROBLEM — E55.9 VITAMIN D DEFICIENCY: Status: ACTIVE | Noted: 2021-07-27

## 2023-01-13 PROBLEM — M25.50 PAIN IN JOINT: Status: ACTIVE | Noted: 2021-07-27

## 2023-01-13 PROBLEM — R00.2 PALPITATIONS: Status: ACTIVE | Noted: 2021-07-27

## 2023-01-13 PROBLEM — D51.0 PERNICIOUS ANEMIA: Status: ACTIVE | Noted: 2021-07-27

## 2023-01-13 LAB
ALBUMIN SERPL-MCNC: 4.3 G/DL (ref 3.5–5.2)
ALP BLD-CCNC: 62 U/L (ref 35–104)
ALT SERPL-CCNC: 14 U/L (ref 5–33)
ANION GAP SERPL CALCULATED.3IONS-SCNC: 10 MMOL/L (ref 7–19)
AST SERPL-CCNC: 16 U/L (ref 5–32)
BILIRUB SERPL-MCNC: 0.3 MG/DL (ref 0.2–1.2)
BUN BLDV-MCNC: 10 MG/DL (ref 6–20)
CALCIUM SERPL-MCNC: 9.4 MG/DL (ref 8.6–10)
CHLORIDE BLD-SCNC: 105 MMOL/L (ref 98–111)
CO2: 25 MMOL/L (ref 22–29)
CREAT SERPL-MCNC: 0.7 MG/DL (ref 0.5–0.9)
GFR SERPL CREATININE-BSD FRML MDRD: >60 ML/MIN/{1.73_M2}
GLUCOSE BLD-MCNC: 90 MG/DL (ref 74–109)
HCT VFR BLD CALC: 44.6 % (ref 37–47)
HEMOGLOBIN: 14.6 G/DL (ref 12–16)
MCH RBC QN AUTO: 29.6 PG (ref 27–31)
MCHC RBC AUTO-ENTMCNC: 32.7 G/DL (ref 33–37)
MCV RBC AUTO: 90.5 FL (ref 81–99)
PDW BLD-RTO: 12.4 % (ref 11.5–14.5)
PLATELET # BLD: 219 K/UL (ref 130–400)
PMV BLD AUTO: 9.9 FL (ref 9.4–12.3)
POTASSIUM SERPL-SCNC: 4.4 MMOL/L (ref 3.5–5)
RBC # BLD: 4.93 M/UL (ref 4.2–5.4)
SODIUM BLD-SCNC: 140 MMOL/L (ref 136–145)
TOTAL PROTEIN: 6.4 G/DL (ref 6.6–8.7)
WBC # BLD: 4.3 K/UL (ref 4.8–10.8)

## 2023-01-13 PROCEDURE — 99213 OFFICE O/P EST LOW 20 MIN: CPT | Performed by: INTERNAL MEDICINE

## 2023-01-13 RX ORDER — SUCRALFATE ORAL 1 G/10ML
1 SUSPENSION ORAL 4 TIMES DAILY
Qty: 1200 ML | Refills: 3 | Status: SHIPPED | OUTPATIENT
Start: 2023-01-13

## 2023-01-13 ASSESSMENT — PATIENT HEALTH QUESTIONNAIRE - PHQ9
SUM OF ALL RESPONSES TO PHQ9 QUESTIONS 1 & 2: 0
2. FEELING DOWN, DEPRESSED OR HOPELESS: 0
1. LITTLE INTEREST OR PLEASURE IN DOING THINGS: 0
SUM OF ALL RESPONSES TO PHQ QUESTIONS 1-9: 0

## 2023-01-13 NOTE — PROGRESS NOTES
Chief Complaint   Patient presents with    Other     A GI issue started last Friday. HPI: Twan Borjas is a 65 y.o. female is here for patient of nausea and vomiting. She states that Friday, she went out to eat. After she got home, she vomited from 9 PM to 430. Her stomach was very bloated and tender. She was very concerned that she might have a small bowel obstruction. Therefore,  called a colleague at [facility] radiology. There, she had a CT scan, which showed increased fluid content within the multiple loops of the small bowel, measuring up to 2.5 cm. There was concern for possible localized ileus or enteritis. She also had a moderate stool volume and there was a question of mild constipation. When she made the appointment, she was feeling very poorly. However, today she is feeling much better and able to tolerate some bland foods and fluids. Her bowels are moving. She does feel much better at this time. She is not having any complaints of fevers or chills.     Past Medical History:   Diagnosis Date    Acid reflux     Anemia     BRCA negative     Chronic back pain     GERD (gastroesophageal reflux disease)     Hypothyroidism     Irritable bowel syndrome     Premature atrial contraction     Skipped heart beats     Thyroid disease     Thyroid mass     Vitamin D deficiency       Past Surgical History:   Procedure Laterality Date    BREAST BIOPSY Right 2012    benign    BREAST ENHANCEMENT SURGERY Bilateral 1999    saline,retro-pectoral    BREAST SURGERY      Augmentation,Biopsy+    CHOLECYSTECTOMY      COLONOSCOPY  05/25/2005    Dr Perlita Andrews, no active colitis    COLONOSCOPY  12/12/2012    Dr Billie Malagon colon    COLONOSCOPY N/A 04/22/2021    Dr George Arreguin, 5 yr recall    HEMORRHOID SURGERY      THYROID SURGERY      Biopsy    THYROID SURGERY      nodule removed    TONSILLECTOMY      TUBAL LIGATION      UPPER GASTROINTESTINAL ENDOSCOPY  05/25/2005    Dr Ebenezer Vidal celiac, gastritis UPPER GASTROINTESTINAL ENDOSCOPY  2015    Dr Allan Ram neg, mild antral gastritis    UPPER GASTROINTESTINAL ENDOSCOPY N/A 2019    Dr Molly Munguia exam, NEG EoE    UPPER GASTROINTESTINAL ENDOSCOPY  2022    Dr Glory Escamilla-Esophagitis vs Guaman's, gastritis    UPPER GASTROINTESTINAL ENDOSCOPY  2012    Dr Allan Ram neg, antral gastritis, no celiac    UPPER GASTROINTESTINAL ENDOSCOPY N/A 2022    Dr Glory Escamilla-Gastritis, esophagitis, no h pylori      Social History     Socioeconomic History    Marital status:    Tobacco Use    Smoking status: Never    Smokeless tobacco: Never   Vaping Use    Vaping Use: Never used   Substance and Sexual Activity    Alcohol use: Yes     Comment: couple times per week    Drug use: No    Sexual activity: Yes     Partners: Male     Birth control/protection: Surgical   Social History Narrative    ** Merged History Encounter **          Social Determinants of Health     Financial Resource Strain: Low Risk     Difficulty of Paying Living Expenses: Not hard at all   Food Insecurity: No Food Insecurity    Worried About Running Out of Food in the Last Year: Never true    Ran Out of Food in the Last Year: Never true   Transportation Needs: No Transportation Needs    Lack of Transportation (Medical): No    Lack of Transportation (Non-Medical): No      Family History   Problem Relation Age of Onset    Other Mother         brain bleed    Dementia Mother     High Blood Pressure Mother     Lung Cancer Father          54, lung cancer    Cancer Father         Lung-Smoker    Breast Cancer Sister 50         at 47, did not wake up after a nap    Heart Attack Sister     Other Other         brain aneurysm    Cancer Maternal Grandfather         rectal cancer    Colon Cancer Maternal Grandfather     Colon Polyps Maternal Grandfather     Esophageal Cancer Neg Hx     Liver Cancer Neg Hx     Liver Disease Neg Hx     Rectal Cancer Neg Hx     Stomach Cancer Neg Hx         Current Outpatient Medications   Medication Sig Dispense Refill    sucralfate (CARAFATE) 1 GM/10ML suspension Take 10 mLs by mouth 4 times daily 1200 mL 3    ondansetron (ZOFRAN) 4 MG tablet Take 1 tablet by mouth every 6 hours as needed for Nausea or Vomiting 20 tablet 0    tiZANidine (ZANAFLEX) 4 MG tablet Take 1 tablet by mouth every 6 hours as needed (muscle spasms) 60 tablet 0    vitamin D (ERGOCALCIFEROL) 1.25 MG (78337 UT) CAPS capsule TAKE ONE CAPSULE BY MOUTH ONCE A WEEK 12 capsule 3    levothyroxine (SYNTHROID) 75 MCG tablet TAKE ONE TABLET ONE TIME DAILY 90 tablet 3    lansoprazole (PREVACID) 30 MG delayed release capsule TAKE ONE CAPSULE TWO TIMES A  capsule 3    MISC NATURAL PRODUCTS EX Apply topically [place] [employer] hormone compound nightly      Triamcinolone Acetonide (NASACORT ALLERGY 24HR NA) by Nasal route daily as needed      conjugated estrogens (PREMARIN) 0.625 MG/GM vaginal cream Place 0.5 g vaginally daily 1 Tube 3    loratadine (CLARITIN) 10 MG capsule Take 10 mg by mouth daily      Alum Hydroxide-Mag Carbonate (GAVISCON PO) Take by mouth nightly       busPIRone (BUSPAR) 5 MG tablet Take 5 mg by mouth 2 times daily as needed (Patient not taking: Reported on 1/13/2023)       No current facility-administered medications for this visit.         Patient Active Problem List   Diagnosis    LLQ abdominal pain    Chronic constipation    Epigastric pressure    Heartburn    Indigestion    Acid reflux    Esophageal spasm    Hemorrhoids    Chest pain    Breast tenderness in female    Breast mass, left    History of breast augmentation    Iron deficiency anemia secondary to inadequate dietary iron intake    Other fatigue    Close exposure to COVID-19 virus    Chronic heartburn    Dyspepsia    Nausea    Esophageal pain    Left upper quadrant abdominal pain    Anxiety    Breast pain, right    Atrial premature complex    Pain in joint    Palpitations    Pernicious anemia    Thyroid nodule Vitamin D deficiency        Review of Systems   Constitutional:  Negative for activity change, appetite change, chills, diaphoresis, fatigue, fever and unexpected weight change. HENT:  Negative for congestion, ear pain, facial swelling, hearing loss, mouth sores, nosebleeds, postnasal drip, rhinorrhea, sinus pressure, sneezing, sore throat, tinnitus, trouble swallowing and voice change. Eyes:  Negative for photophobia, pain, discharge, redness, itching and visual disturbance. Respiratory:  Negative for cough, choking, chest tightness, shortness of breath and wheezing. Cardiovascular:  Negative for chest pain, palpitations and leg swelling. Gastrointestinal:  Positive for abdominal pain and nausea. Negative for abdominal distention, anal bleeding, blood in stool, constipation, diarrhea, rectal pain and vomiting. Esophageal spasms/acid reflux is improving. Upper quadrant abdominal pain at times. She attributes this to muscle pain. Endocrine: Negative for cold intolerance, heat intolerance, polydipsia, polyphagia and polyuria. Genitourinary:  Negative for decreased urine volume, difficulty urinating, dysuria, flank pain, frequency, hematuria and urgency. Musculoskeletal:  Positive for arthralgias and back pain. Negative for gait problem, joint swelling, myalgias, neck pain and neck stiffness. Skin:  Negative for color change, pallor and rash. Allergic/Immunologic: Negative for environmental allergies and food allergies. Neurological:  Positive for weakness. Negative for dizziness, tremors, syncope, light-headedness, numbness and headaches. Hematological:  Negative for adenopathy. Does not bruise/bleed easily. Psychiatric/Behavioral:  Negative for agitation, behavioral problems, confusion, decreased concentration, dysphoric mood, hallucinations, self-injury, sleep disturbance and suicidal ideas. The patient is not nervous/anxious and is not hyperactive.       /72   Pulse 70 Ht 5' 7\" (1.702 m)   Wt 139 lb 12.8 oz (63.4 kg)   LMP 01/13/2020 (Approximate)   SpO2 98%   BMI 21.90 kg/m²   Physical Exam  Vitals and nursing note reviewed. Constitutional:       General: She is not in acute distress. Appearance: Normal appearance. She is normal weight. She is not ill-appearing, toxic-appearing or diaphoretic. HENT:      Head: Normocephalic and atraumatic. Right Ear: Tympanic membrane, ear canal and external ear normal. There is no impacted cerumen. Left Ear: Tympanic membrane, ear canal and external ear normal. There is no impacted cerumen. Nose: Nose normal. No congestion or rhinorrhea. Mouth/Throat:      Mouth: Mucous membranes are moist.      Pharynx: Oropharynx is clear. No oropharyngeal exudate or posterior oropharyngeal erythema. Eyes:      General: No scleral icterus. Right eye: No discharge. Left eye: No discharge. Extraocular Movements: Extraocular movements intact. Conjunctiva/sclera: Conjunctivae normal.      Pupils: Pupils are equal, round, and reactive to light. Neck:      Vascular: No carotid bruit. Cardiovascular:      Rate and Rhythm: Normal rate and regular rhythm. Pulses: Normal pulses. Heart sounds: Normal heart sounds. No murmur heard. No friction rub. No gallop. Pulmonary:      Effort: Pulmonary effort is normal. No respiratory distress. Breath sounds: Normal breath sounds. No stridor. No wheezing, rhonchi or rales. Chest:      Chest wall: No tenderness. Abdominal:      General: Abdomen is flat. Bowel sounds are normal. There is no distension. Palpations: Abdomen is soft. Tenderness: There is abdominal tenderness. There is no guarding. Hernia: No hernia is present. Musculoskeletal:         General: Tenderness present. No swelling, deformity or signs of injury. Cervical back: Normal range of motion and neck supple. No rigidity. No muscular tenderness.       Right lower leg: No edema. Left lower leg: No edema. Comments: There is some tenderness on palpation of the right hip. Lymphadenopathy:      Cervical: No cervical adenopathy. Skin:     General: Skin is warm and dry. Capillary Refill: Capillary refill takes less than 2 seconds. Neurological:      General: No focal deficit present. Mental Status: She is alert and oriented to person, place, and time. Mental status is at baseline. Psychiatric:         Mood and Affect: Mood normal.         Behavior: Behavior normal.         Thought Content: Thought content normal.         Judgment: Judgment normal.       1. Gastroenteritis        ASSESSMENT/PLAN:    72-year-old woman here for follow-up    Abdominal pain/nausea and vomiting: Concerning for possible gastroenteritis versus food poisoning. She is doing much better with Zofran. She is feeling better today. If her symptoms persist, she may need further work-up. Continue Carafate and Zofran as prescribed for now. Dameon Mallory was seen today for other. Diagnoses and all orders for this visit:    Gastroenteritis    Other orders  -     sucralfate (CARAFATE) 1 GM/10ML suspension; Take 10 mLs by mouth 4 times daily        No follow-ups on file. No orders of the defined types were placed in this encounter.       Star Carrasco MD

## 2023-01-13 NOTE — TELEPHONE ENCOUNTER
CT shows a possible inflammation of the bowel. It sounds like she is doing much better now.   If she starts to get worse over the weekend, please let someone know

## 2023-01-19 ASSESSMENT — ENCOUNTER SYMPTOMS
TROUBLE SWALLOWING: 0
SORE THROAT: 0
FACIAL SWELLING: 0
SINUS PRESSURE: 0
ABDOMINAL PAIN: 1
SHORTNESS OF BREATH: 0
CONSTIPATION: 0
EYE ITCHING: 0
RECTAL PAIN: 0
PHOTOPHOBIA: 0
RHINORRHEA: 0
EYE DISCHARGE: 0
CHOKING: 0
ANAL BLEEDING: 0
EYE PAIN: 0
COLOR CHANGE: 0
ABDOMINAL DISTENTION: 0
COUGH: 0
NAUSEA: 1
BLOOD IN STOOL: 0
DIARRHEA: 0
CHEST TIGHTNESS: 0
VOMITING: 0
EYE REDNESS: 0
VOICE CHANGE: 0
BACK PAIN: 1
WHEEZING: 0

## 2023-03-10 RX ORDER — CONJUGATED ESTROGENS 0.62 MG/G
0.5 CREAM VAGINAL DAILY
Qty: 30 G | Refills: 0 | Status: SHIPPED | OUTPATIENT
Start: 2023-03-10

## 2023-03-10 NOTE — TELEPHONE ENCOUNTER
Nakita Nam called requesting a refill of the below medication which has been pended for you:     Requested Prescriptions     Pending Prescriptions Disp Refills    estrogens conjugated (PREMARIN) 0.625 MG/GM CREA vaginal cream 30 g 0     Sig: Place 0.5 g vaginally daily       Last Appointment Date: 1/13/2023  Next Appointment Date: 5/4/2023    Allergies   Allergen Reactions    Celebrex [Celecoxib] Anaphylaxis

## 2023-03-16 ENCOUNTER — TELEPHONE (OUTPATIENT)
Dept: INTERNAL MEDICINE | Age: 58
End: 2023-03-16

## 2023-03-16 NOTE — TELEPHONE ENCOUNTER
Pt called and stated that Rayray Patino had not received her refill for her compounded hormone. This NCMA called and gave a verbal on the medication, quantity, and number of refills.

## 2023-04-06 RX ORDER — LEVOTHYROXINE SODIUM 0.07 MG/1
TABLET ORAL
Qty: 90 TABLET | Refills: 3 | Status: SHIPPED | OUTPATIENT
Start: 2023-04-06

## 2023-04-18 ENCOUNTER — HOSPITAL ENCOUNTER (OUTPATIENT)
Dept: GENERAL RADIOLOGY | Age: 58
Discharge: HOME | End: 2023-04-18
Payer: COMMERCIAL

## 2023-04-18 ENCOUNTER — OFFICE VISIT (OUTPATIENT)
Dept: INTERNAL MEDICINE | Age: 58
End: 2023-04-18
Payer: COMMERCIAL

## 2023-04-18 VITALS
BODY MASS INDEX: 21.82 KG/M2 | SYSTOLIC BLOOD PRESSURE: 126 MMHG | OXYGEN SATURATION: 99 % | DIASTOLIC BLOOD PRESSURE: 74 MMHG | HEIGHT: 67 IN | HEART RATE: 73 BPM | WEIGHT: 139 LBS

## 2023-04-18 DIAGNOSIS — R30.0 DYSURIA: ICD-10-CM

## 2023-04-18 DIAGNOSIS — E34.9 HORMONAL DISORDER: ICD-10-CM

## 2023-04-18 DIAGNOSIS — R10.12 LUQ ABDOMINAL PAIN: Primary | ICD-10-CM

## 2023-04-18 DIAGNOSIS — R07.81 RIB PAIN ON LEFT SIDE: ICD-10-CM

## 2023-04-18 PROCEDURE — 71101 X-RAY EXAM UNILAT RIBS/CHEST: CPT | Performed by: RADIOLOGY

## 2023-04-18 PROCEDURE — 71101 X-RAY EXAM UNILAT RIBS/CHEST: CPT

## 2023-04-18 PROCEDURE — 99214 OFFICE O/P EST MOD 30 MIN: CPT | Performed by: INTERNAL MEDICINE

## 2023-04-18 RX ORDER — BACLOFEN 10 MG/1
10 TABLET ORAL 3 TIMES DAILY
Qty: 30 TABLET | Refills: 1 | Status: SHIPPED | OUTPATIENT
Start: 2023-04-18

## 2023-04-19 ENCOUNTER — HOSPITAL ENCOUNTER (OUTPATIENT)
Dept: MRI IMAGING | Age: 58
Discharge: HOME | End: 2023-04-19
Payer: COMMERCIAL

## 2023-04-19 DIAGNOSIS — R10.12 LUQ ABDOMINAL PAIN: ICD-10-CM

## 2023-04-19 PROCEDURE — 74183 MRI ABD W/O CNTR FLWD CNTR: CPT | Performed by: RADIOLOGY

## 2023-04-19 PROCEDURE — 74183 MRI ABD W/O CNTR FLWD CNTR: CPT

## 2023-04-19 PROCEDURE — A9577 INJ MULTIHANCE: HCPCS | Performed by: INTERNAL MEDICINE

## 2023-04-19 PROCEDURE — 6360000004 HC RX CONTRAST MEDICATION: Performed by: INTERNAL MEDICINE

## 2023-04-19 RX ADMIN — GADOBENATE DIMEGLUMINE 13 ML: 529 INJECTION, SOLUTION INTRAVENOUS at 08:08

## 2023-04-20 ENCOUNTER — TELEPHONE (OUTPATIENT)
Dept: INTERNAL MEDICINE | Age: 58
End: 2023-04-20

## 2023-04-20 DIAGNOSIS — R30.0 DYSURIA: ICD-10-CM

## 2023-04-20 DIAGNOSIS — E34.9 HORMONAL DISORDER: ICD-10-CM

## 2023-04-20 DIAGNOSIS — R07.81 RIB PAIN ON LEFT SIDE: ICD-10-CM

## 2023-04-20 LAB
AMYLASE SERPL-CCNC: 84 U/L (ref 28–100)
BACTERIA #/AREA URNS HPF: ABNORMAL /HPF
BILIRUB UR QL STRIP: NEGATIVE
CLARITY UR: CLEAR
COLOR UR: YELLOW
CORTIS AM PEAK SERPL-MCNC: 13.2 UG/DL (ref 6.2–19.4)
GLUCOSE UR STRIP.AUTO-MCNC: NEGATIVE MG/DL
HGB UR STRIP.AUTO-MCNC: NEGATIVE MG/L
KETONES UR STRIP.AUTO-MCNC: NEGATIVE MG/DL
LEUKOCYTE ESTERASE UR QL STRIP.AUTO: ABNORMAL
LIPASE SERPL-CCNC: 45 U/L (ref 13–60)
NITRITE UR QL STRIP.AUTO: NEGATIVE
PH UR STRIP.AUTO: 6.5 [PH] (ref 5–8)
PROGEST SERPL-MCNC: 3.64 NG/ML
PROT UR STRIP.AUTO-MCNC: NEGATIVE MG/DL
RBC #/AREA URNS HPF: ABNORMAL /HPF (ref 0–2)
SP GR UR STRIP.AUTO: 1.01 (ref 1–1.03)
SQUAMOUS #/AREA URNS HPF: ABNORMAL /HPF
T3FREE SERPL-MCNC: 2.3 PG/ML (ref 2–4.4)
T4 FREE SERPL-MCNC: 1.43 NG/DL (ref 0.93–1.7)
TESTOST SERPL-MCNC: 6.8 NG/DL (ref 2.9–40.8)
UROBILINOGEN UR STRIP.AUTO-MCNC: 0.2 E.U./DL
WBC #/AREA URNS HPF: ABNORMAL /HPF (ref 0–5)

## 2023-04-20 RX ORDER — CEFDINIR 300 MG/1
300 CAPSULE ORAL 2 TIMES DAILY
Qty: 20 CAPSULE | Refills: 0 | Status: SHIPPED | OUTPATIENT
Start: 2023-04-20 | End: 2023-04-30

## 2023-04-20 NOTE — TELEPHONE ENCOUNTER
----- Message from Ly Blanco MD sent at 4/20/2023  1:21 PM CDT -----  She has a slight urinary tract infection: Omnicef 300 mg p.o. twice daily for 10 days. T4 and T3 are normal testosterone is okay. Her progesterone is a little high.   Is she taking any hormonal supplements?,  Lipase and amylase are normal cortisol is normal

## 2023-04-22 LAB — DHEA-S SERPL-MCNC: 27 UG/DL (ref 19–205)

## 2023-04-24 ENCOUNTER — TELEPHONE (OUTPATIENT)
Dept: INTERNAL MEDICINE | Age: 58
End: 2023-04-24

## 2023-04-24 DIAGNOSIS — G89.29 CHRONIC LOW BACK PAIN, UNSPECIFIED BACK PAIN LATERALITY, UNSPECIFIED WHETHER SCIATICA PRESENT: Primary | ICD-10-CM

## 2023-04-24 DIAGNOSIS — M54.50 CHRONIC LOW BACK PAIN, UNSPECIFIED BACK PAIN LATERALITY, UNSPECIFIED WHETHER SCIATICA PRESENT: Primary | ICD-10-CM

## 2023-04-24 ASSESSMENT — ENCOUNTER SYMPTOMS
RECTAL PAIN: 0
RHINORRHEA: 0
SHORTNESS OF BREATH: 0
WHEEZING: 0
COUGH: 0
ANAL BLEEDING: 0
EYE ITCHING: 0
PHOTOPHOBIA: 0
CHEST TIGHTNESS: 0
FACIAL SWELLING: 0
COLOR CHANGE: 0
BACK PAIN: 1
BLOOD IN STOOL: 0
SINUS PRESSURE: 0
ABDOMINAL DISTENTION: 0
VOICE CHANGE: 0
EYE DISCHARGE: 0
VOMITING: 0
CHOKING: 0
TROUBLE SWALLOWING: 0
EYE REDNESS: 0
ABDOMINAL PAIN: 1
NAUSEA: 1
SORE THROAT: 0
DIARRHEA: 0
EYE PAIN: 0
CONSTIPATION: 0

## 2023-04-24 NOTE — TELEPHONE ENCOUNTER
With everything coming up negative with her prior rib x-rays, might be a good idea to start with a lumbar and thoracic spine x-rays to see if there is anything there that could be contributing to her pain

## 2023-04-25 ENCOUNTER — HOSPITAL ENCOUNTER (OUTPATIENT)
Dept: GENERAL RADIOLOGY | Age: 58
Discharge: HOME | End: 2023-04-25
Payer: COMMERCIAL

## 2023-04-25 DIAGNOSIS — G89.29 CHRONIC LOW BACK PAIN, UNSPECIFIED BACK PAIN LATERALITY, UNSPECIFIED WHETHER SCIATICA PRESENT: ICD-10-CM

## 2023-04-25 DIAGNOSIS — M54.50 CHRONIC LOW BACK PAIN, UNSPECIFIED BACK PAIN LATERALITY, UNSPECIFIED WHETHER SCIATICA PRESENT: ICD-10-CM

## 2023-04-25 LAB
ESTRADIOL SERPL HS-MCNC: 21 PG/ML
ESTROGEN SERPL CALC-MCNC: 39.8 PG/ML
ESTRONE SERPL-MCNC: 18.8 PG/ML

## 2023-04-25 PROCEDURE — 72100 X-RAY EXAM L-S SPINE 2/3 VWS: CPT

## 2023-04-25 PROCEDURE — 72070 X-RAY EXAM THORAC SPINE 2VWS: CPT

## 2023-04-25 PROCEDURE — 72100 X-RAY EXAM L-S SPINE 2/3 VWS: CPT | Performed by: RADIOLOGY

## 2023-04-25 PROCEDURE — 72070 X-RAY EXAM THORAC SPINE 2VWS: CPT | Performed by: RADIOLOGY

## 2023-05-04 ENCOUNTER — OFFICE VISIT (OUTPATIENT)
Dept: INTERNAL MEDICINE | Age: 58
End: 2023-05-04
Payer: COMMERCIAL

## 2023-05-04 VITALS
WEIGHT: 141 LBS | RESPIRATION RATE: 20 BRPM | HEART RATE: 79 BPM | BODY MASS INDEX: 22.13 KG/M2 | SYSTOLIC BLOOD PRESSURE: 128 MMHG | DIASTOLIC BLOOD PRESSURE: 80 MMHG | HEIGHT: 67 IN | OXYGEN SATURATION: 99 %

## 2023-05-04 DIAGNOSIS — G89.29 CHRONIC LOW BACK PAIN, UNSPECIFIED BACK PAIN LATERALITY, UNSPECIFIED WHETHER SCIATICA PRESENT: Primary | ICD-10-CM

## 2023-05-04 DIAGNOSIS — E78.5 HYPERLIPIDEMIA, UNSPECIFIED HYPERLIPIDEMIA TYPE: ICD-10-CM

## 2023-05-04 DIAGNOSIS — Z87.440 RECENT URINARY TRACT INFECTION: ICD-10-CM

## 2023-05-04 DIAGNOSIS — E34.9 HORMONE DISORDER: ICD-10-CM

## 2023-05-04 DIAGNOSIS — M54.50 CHRONIC LOW BACK PAIN, UNSPECIFIED BACK PAIN LATERALITY, UNSPECIFIED WHETHER SCIATICA PRESENT: Primary | ICD-10-CM

## 2023-05-04 DIAGNOSIS — E03.9 HYPOTHYROIDISM, UNSPECIFIED TYPE: ICD-10-CM

## 2023-05-04 DIAGNOSIS — E55.9 VITAMIN D DEFICIENCY: ICD-10-CM

## 2023-05-04 DIAGNOSIS — R73.9 HYPERGLYCEMIA: ICD-10-CM

## 2023-05-04 DIAGNOSIS — F41.9 ANXIETY DISORDER, UNSPECIFIED TYPE: ICD-10-CM

## 2023-05-04 DIAGNOSIS — R10.12 LEFT UPPER QUADRANT ABDOMINAL PAIN: ICD-10-CM

## 2023-05-04 DIAGNOSIS — Z91.09 ENVIRONMENTAL ALLERGIES: ICD-10-CM

## 2023-05-04 DIAGNOSIS — K21.9 GASTROESOPHAGEAL REFLUX DISEASE WITHOUT ESOPHAGITIS: ICD-10-CM

## 2023-05-04 LAB
BILIRUB UR QL STRIP: NEGATIVE
CLARITY UR: CLEAR
COLOR UR: YELLOW
GLUCOSE UR STRIP.AUTO-MCNC: NEGATIVE MG/DL
HGB UR STRIP.AUTO-MCNC: NEGATIVE MG/L
KETONES UR STRIP.AUTO-MCNC: NEGATIVE MG/DL
LEUKOCYTE ESTERASE UR QL STRIP.AUTO: NEGATIVE
NITRITE UR QL STRIP.AUTO: NEGATIVE
PH UR STRIP.AUTO: 5.5 [PH] (ref 5–8)
PROT UR STRIP.AUTO-MCNC: NEGATIVE MG/DL
SP GR UR STRIP.AUTO: 1.01 (ref 1–1.03)
UROBILINOGEN UR STRIP.AUTO-MCNC: 0.2 E.U./DL

## 2023-05-04 PROCEDURE — 99214 OFFICE O/P EST MOD 30 MIN: CPT | Performed by: INTERNAL MEDICINE

## 2023-05-04 RX ORDER — LEVOTHYROXINE SODIUM 0.1 MG/1
100 TABLET ORAL DAILY
Qty: 90 TABLET | Refills: 2 | Status: SHIPPED | OUTPATIENT
Start: 2023-05-04

## 2023-05-04 SDOH — ECONOMIC STABILITY: INCOME INSECURITY: HOW HARD IS IT FOR YOU TO PAY FOR THE VERY BASICS LIKE FOOD, HOUSING, MEDICAL CARE, AND HEATING?: NOT HARD AT ALL

## 2023-05-04 SDOH — ECONOMIC STABILITY: HOUSING INSECURITY
IN THE LAST 12 MONTHS, WAS THERE A TIME WHEN YOU DID NOT HAVE A STEADY PLACE TO SLEEP OR SLEPT IN A SHELTER (INCLUDING NOW)?: NO

## 2023-05-04 SDOH — ECONOMIC STABILITY: FOOD INSECURITY: WITHIN THE PAST 12 MONTHS, THE FOOD YOU BOUGHT JUST DIDN'T LAST AND YOU DIDN'T HAVE MONEY TO GET MORE.: NEVER TRUE

## 2023-05-04 SDOH — ECONOMIC STABILITY: FOOD INSECURITY: WITHIN THE PAST 12 MONTHS, YOU WORRIED THAT YOUR FOOD WOULD RUN OUT BEFORE YOU GOT MONEY TO BUY MORE.: NEVER TRUE

## 2023-05-04 NOTE — PROGRESS NOTES
Chief Complaint   Patient presents with    6 Month Follow-Up       HPI: Becky Lanes is a 65 y.o. female is here for hypothyroidism, chronic back pain, anxiety, vitamin D deficiency, and acid reflux. She is seeing physical therapy. She has noted big improvement in her left upper quadrant abdominal pain and low back pain. She still has some rib pain at times underneath her left breast.  However, overall, she feels like she is doing much better. Her TSH was elevated. She is agreeable to increasing her levothyroxine to 100 mcg p.o. daily. She still complains of a little bit of burning with urination. She recently had a urinary tract infection. Check a urinalysis today. She is talking with her compounding pharmacy about adjusting her hormones. Overall, she feels well and has no major concerns or complaints today. She does have a history of vitamin D deficiency and is taking her ergocalciferol. Her acid reflux is well controlled on Prevacid.   Her allergies are stable    Past Medical History:   Diagnosis Date    Acid reflux     Anemia     BRCA negative     Chronic back pain     GERD (gastroesophageal reflux disease)     Hypothyroidism     Irritable bowel syndrome     Premature atrial contraction     Skipped heart beats     Thyroid disease     Thyroid mass     Vitamin D deficiency       Past Surgical History:   Procedure Laterality Date    BREAST BIOPSY Right 2012    benign    BREAST ENHANCEMENT SURGERY Bilateral 1999    saline,retro-pectoral    BREAST SURGERY      Augmentation,Biopsy+    CHOLECYSTECTOMY      COLONOSCOPY  05/25/2005    Dr Jaci Mendez, no active colitis    COLONOSCOPY  12/12/2012    Dr Magali Lopez colon    COLONOSCOPY N/A 04/22/2021    Dr Estelita Hollis, 5 yr recall    HEMORRHOID SURGERY      THYROID SURGERY      Biopsy    THYROID SURGERY      nodule removed    TONSILLECTOMY      TUBAL LIGATION      UPPER GASTROINTESTINAL ENDOSCOPY  05/25/2005    Dr Delvin Dupont celiac, gastritis    UPPER

## 2023-05-07 ASSESSMENT — ENCOUNTER SYMPTOMS
COLOR CHANGE: 0
BLOOD IN STOOL: 0
PHOTOPHOBIA: 0
CHEST TIGHTNESS: 0
VOMITING: 0
ANAL BLEEDING: 0
RHINORRHEA: 0
ABDOMINAL PAIN: 1
ABDOMINAL DISTENTION: 0
BACK PAIN: 1
RECTAL PAIN: 0
TROUBLE SWALLOWING: 0
CONSTIPATION: 0
VOICE CHANGE: 0
SHORTNESS OF BREATH: 0
SORE THROAT: 0
FACIAL SWELLING: 0
WHEEZING: 0
EYE REDNESS: 0
EYE PAIN: 0
SINUS PRESSURE: 0
EYE ITCHING: 0
NAUSEA: 1
CHOKING: 0
DIARRHEA: 0
COUGH: 0
EYE DISCHARGE: 0

## 2023-05-07 ASSESSMENT — PATIENT HEALTH QUESTIONNAIRE - PHQ9
1. LITTLE INTEREST OR PLEASURE IN DOING THINGS: 0
SUM OF ALL RESPONSES TO PHQ QUESTIONS 1-9: 0
SUM OF ALL RESPONSES TO PHQ9 QUESTIONS 1 & 2: 0
SUM OF ALL RESPONSES TO PHQ QUESTIONS 1-9: 0
2. FEELING DOWN, DEPRESSED OR HOPELESS: 0

## 2023-06-02 ENCOUNTER — HOSPITAL ENCOUNTER (OUTPATIENT)
Dept: ULTRASOUND IMAGING | Age: 58
Discharge: HOME | End: 2023-06-02
Payer: COMMERCIAL

## 2023-06-02 ENCOUNTER — OFFICE VISIT (OUTPATIENT)
Dept: INTERNAL MEDICINE | Age: 58
End: 2023-06-02
Payer: COMMERCIAL

## 2023-06-02 VITALS
OXYGEN SATURATION: 98 % | HEIGHT: 67 IN | BODY MASS INDEX: 22.13 KG/M2 | SYSTOLIC BLOOD PRESSURE: 120 MMHG | DIASTOLIC BLOOD PRESSURE: 72 MMHG | WEIGHT: 141 LBS | HEART RATE: 73 BPM

## 2023-06-02 DIAGNOSIS — R59.9 SWELLING OF LYMPH NODE: ICD-10-CM

## 2023-06-02 DIAGNOSIS — R59.9 SWELLING OF LYMPH NODE: Primary | ICD-10-CM

## 2023-06-02 LAB
ALBUMIN SERPL-MCNC: 4.3 G/DL (ref 3.5–5.2)
ALP SERPL-CCNC: 82 U/L (ref 35–104)
ALT SERPL-CCNC: 18 U/L (ref 5–33)
ANION GAP SERPL CALCULATED.3IONS-SCNC: 7 MMOL/L (ref 7–19)
AST SERPL-CCNC: 22 U/L (ref 5–32)
BASOPHILS # BLD: 0 K/UL (ref 0–0.2)
BASOPHILS NFR BLD: 0.6 % (ref 0–1)
BILIRUB SERPL-MCNC: 0.3 MG/DL (ref 0.2–1.2)
BUN SERPL-MCNC: 10 MG/DL (ref 6–20)
CALCIUM SERPL-MCNC: 9.2 MG/DL (ref 8.6–10)
CHLORIDE SERPL-SCNC: 105 MMOL/L (ref 98–111)
CO2 SERPL-SCNC: 26 MMOL/L (ref 22–29)
CREAT SERPL-MCNC: 0.7 MG/DL (ref 0.5–0.9)
EOSINOPHIL # BLD: 0 K/UL (ref 0–0.6)
EOSINOPHIL NFR BLD: 1.3 % (ref 0–5)
ERYTHROCYTE [DISTWIDTH] IN BLOOD BY AUTOMATED COUNT: 13.2 % (ref 11.5–14.5)
GLUCOSE SERPL-MCNC: 130 MG/DL (ref 74–109)
HCT VFR BLD AUTO: 49.2 % (ref 37–47)
HGB BLD-MCNC: 14.7 G/DL (ref 12–16)
IMM GRANULOCYTES # BLD: 0 K/UL
LYMPHOCYTES # BLD: 1 K/UL (ref 1.1–4.5)
LYMPHOCYTES NFR BLD: 31.8 % (ref 20–40)
MCH RBC QN AUTO: 29.2 PG (ref 27–31)
MCHC RBC AUTO-ENTMCNC: 29.9 G/DL (ref 33–37)
MCV RBC AUTO: 97.6 FL (ref 81–99)
MONOCYTES # BLD: 0.4 K/UL (ref 0–0.9)
MONOCYTES NFR BLD: 11.6 % (ref 0–10)
NEUTROPHILS # BLD: 1.7 K/UL (ref 1.5–7.5)
NEUTS SEG NFR BLD: 54.1 % (ref 50–65)
PLATELET # BLD AUTO: 130 K/UL (ref 130–400)
PMV BLD AUTO: 10.3 FL (ref 9.4–12.3)
POTASSIUM SERPL-SCNC: 4.4 MMOL/L (ref 3.5–5)
PROT SERPL-MCNC: 6.9 G/DL (ref 6.6–8.7)
RBC # BLD AUTO: 5.04 M/UL (ref 4.2–5.4)
SODIUM SERPL-SCNC: 138 MMOL/L (ref 136–145)
WBC # BLD AUTO: 3.2 K/UL (ref 4.8–10.8)

## 2023-06-02 PROCEDURE — 76536 US EXAM OF HEAD AND NECK: CPT

## 2023-06-02 PROCEDURE — 99213 OFFICE O/P EST LOW 20 MIN: CPT | Performed by: NURSE PRACTITIONER

## 2023-06-02 RX ORDER — AMOXICILLIN AND CLAVULANATE POTASSIUM 875; 125 MG/1; MG/1
1 TABLET, FILM COATED ORAL 2 TIMES DAILY
Qty: 20 TABLET | Refills: 0 | Status: SHIPPED | OUTPATIENT
Start: 2023-06-02 | End: 2023-06-12

## 2023-06-02 NOTE — PROGRESS NOTES
[facility]  [facility] INTERNAL MEDICINE  [address] [address] [address]  Dept: [phone]  Dept Fax: [phone]  Loc: [phone]    Magy Lawson is a 65 y.o. female who presents today for her medical conditions/complaintsas noted below. Magy Lawson is c/o of Other (Lymph nodes swollen on the right side/No sinus drainage )        HPI:     HPI  Pat Narvaez presents today with a swollen lymph node on the right side of her neck. Noticed it early this week. She has not had fever or chills. She does not have any symptoms. Does have some allergies. A little sinus pressure, headache, and pressure around ears. She has not noticed any tick bites. Today having more tenderness around the lymph node that radiates down the neck and onto her right side (shoulder/arm).    Past Medical History:   Diagnosis Date    Acid reflux     Anemia     BRCA negative     Chronic back pain     GERD (gastroesophageal reflux disease)     Hypothyroidism     Irritable bowel syndrome     Premature atrial contraction     Skipped heart beats     Thyroid disease     Thyroid mass     Vitamin D deficiency      Past Surgical History:   Procedure Laterality Date    BREAST BIOPSY Right 2012    benign    BREAST ENHANCEMENT SURGERY Bilateral 1999    saline,retro-pectoral    BREAST SURGERY      Augmentation,Biopsy+    CHOLECYSTECTOMY      COLONOSCOPY  05/25/2005    Dr Jennifer Saavedra, no active colitis    COLONOSCOPY  12/12/2012    Dr Ruby Soto colon    COLONOSCOPY N/A 04/22/2021    Dr Angelique Mckeon, 5 yr recall    HEMORRHOID SURGERY      THYROID SURGERY      Biopsy    THYROID SURGERY      nodule removed    TONSILLECTOMY      TUBAL LIGATION      UPPER GASTROINTESTINAL ENDOSCOPY  05/25/2005    Dr Dilcia Mcgowan celiac, gastritis    UPPER GASTROINTESTINAL ENDOSCOPY  02/12/2015    Dr Kaitlin Bautista neg, mild antral gastritis    UPPER GASTROINTESTINAL ENDOSCOPY N/A 08/05/2019    Dr Nicole Emery exam, NEG EoE    UPPER

## 2023-06-05 DIAGNOSIS — R30.0 DYSURIA: ICD-10-CM

## 2023-06-05 DIAGNOSIS — R59.9 SWELLING OF LYMPH NODE: Primary | ICD-10-CM

## 2023-06-05 LAB
BILIRUB UR QL STRIP: NEGATIVE
CLARITY UR: CLEAR
COLOR UR: YELLOW
GLUCOSE UR STRIP.AUTO-MCNC: NEGATIVE MG/DL
HGB UR STRIP.AUTO-MCNC: NEGATIVE MG/L
KETONES UR STRIP.AUTO-MCNC: NEGATIVE MG/DL
LEUKOCYTE ESTERASE UR QL STRIP.AUTO: NEGATIVE
NITRITE UR QL STRIP.AUTO: NEGATIVE
PH UR STRIP.AUTO: 6.5 [PH] (ref 5–8)
PROT UR STRIP.AUTO-MCNC: NEGATIVE MG/DL
SP GR UR STRIP.AUTO: 1 (ref 1–1.03)
UROBILINOGEN UR STRIP.AUTO-MCNC: 0.2 E.U./DL

## 2023-06-06 ENCOUNTER — TELEPHONE (OUTPATIENT)
Dept: INTERNAL MEDICINE | Age: 58
End: 2023-06-06

## 2023-06-06 DIAGNOSIS — R59.0 ENLARGED LYMPH NODE IN NECK: Primary | ICD-10-CM

## 2023-06-12 DIAGNOSIS — R30.0 DYSURIA: ICD-10-CM

## 2023-06-12 DIAGNOSIS — R59.9 SWELLING OF LYMPH NODE: ICD-10-CM

## 2023-06-12 LAB
BASOPHILS # BLD: 0 K/UL (ref 0–0.2)
BASOPHILS NFR BLD: 0.4 % (ref 0–1)
EOSINOPHIL # BLD: 0.1 K/UL (ref 0–0.6)
EOSINOPHIL NFR BLD: 1.9 % (ref 0–5)
ERYTHROCYTE [DISTWIDTH] IN BLOOD BY AUTOMATED COUNT: 13 % (ref 11.5–14.5)
HCT VFR BLD AUTO: 45.3 % (ref 37–47)
HGB BLD-MCNC: 14.6 G/DL (ref 12–16)
IMM GRANULOCYTES # BLD: 0 K/UL
LYMPHOCYTES # BLD: 1.6 K/UL (ref 1.1–4.5)
LYMPHOCYTES NFR BLD: 29.4 % (ref 20–40)
MCH RBC QN AUTO: 29.1 PG (ref 27–31)
MCHC RBC AUTO-ENTMCNC: 32.2 G/DL (ref 33–37)
MCV RBC AUTO: 90.4 FL (ref 81–99)
MONOCYTES # BLD: 0.5 K/UL (ref 0–0.9)
MONOCYTES NFR BLD: 10 % (ref 0–10)
NEUTROPHILS # BLD: 3.1 K/UL (ref 1.5–7.5)
NEUTS SEG NFR BLD: 57.9 % (ref 50–65)
PLATELET # BLD AUTO: 257 K/UL (ref 130–400)
PMV BLD AUTO: 10 FL (ref 9.4–12.3)
RBC # BLD AUTO: 5.01 M/UL (ref 4.2–5.4)
WBC # BLD AUTO: 5.3 K/UL (ref 4.8–10.8)

## 2023-06-21 ENCOUNTER — TELEPHONE (OUTPATIENT)
Dept: INTERNAL MEDICINE | Age: 58
End: 2023-06-21

## 2023-06-21 DIAGNOSIS — R59.9 SWELLING OF LYMPH NODE: ICD-10-CM

## 2023-06-21 DIAGNOSIS — R59.9 SWELLING OF LYMPH NODE: Primary | ICD-10-CM

## 2023-06-21 LAB
BASOPHILS # BLD: 0 K/UL (ref 0–0.2)
BASOPHILS NFR BLD: 0.3 % (ref 0–1)
EOSINOPHIL # BLD: 0.1 K/UL (ref 0–0.6)
EOSINOPHIL NFR BLD: 0.8 % (ref 0–5)
ERYTHROCYTE [DISTWIDTH] IN BLOOD BY AUTOMATED COUNT: 12.9 % (ref 11.5–14.5)
HCT VFR BLD AUTO: 45.3 % (ref 37–47)
HGB BLD-MCNC: 14.9 G/DL (ref 12–16)
IMM GRANULOCYTES # BLD: 0 K/UL
LYMPHOCYTES # BLD: 1.6 K/UL (ref 1.1–4.5)
LYMPHOCYTES NFR BLD: 24.7 % (ref 20–40)
MCH RBC QN AUTO: 29.7 PG (ref 27–31)
MCHC RBC AUTO-ENTMCNC: 32.9 G/DL (ref 33–37)
MCV RBC AUTO: 90.2 FL (ref 81–99)
MONOCYTES # BLD: 0.6 K/UL (ref 0–0.9)
MONOCYTES NFR BLD: 8.7 % (ref 0–10)
NEUTROPHILS # BLD: 4.2 K/UL (ref 1.5–7.5)
NEUTS SEG NFR BLD: 65.2 % (ref 50–65)
PLATELET # BLD AUTO: 259 K/UL (ref 130–400)
PMV BLD AUTO: 10.6 FL (ref 9.4–12.3)
RBC # BLD AUTO: 5.02 M/UL (ref 4.2–5.4)
WBC # BLD AUTO: 6.5 K/UL (ref 4.8–10.8)

## 2023-06-21 RX ORDER — BUSPIRONE HYDROCHLORIDE 5 MG/1
TABLET ORAL
Qty: 90 TABLET | Refills: 0 | Status: SHIPPED | OUTPATIENT
Start: 2023-06-21

## 2023-06-21 NOTE — TELEPHONE ENCOUNTER
Lymph node has improved however pain is still there. Patient thinks it maybe her mastoid bone so she will see a dentist just in case. She is having discomfort in her jaw. Patient is wanting to repeat blood work if we can order again.

## 2023-06-22 ENCOUNTER — TELEPHONE (OUTPATIENT)
Dept: INTERNAL MEDICINE | Age: 58
End: 2023-06-22

## 2023-06-22 ENCOUNTER — OFFICE VISIT (OUTPATIENT)
Dept: INTERNAL MEDICINE | Age: 58
End: 2023-06-22
Payer: COMMERCIAL

## 2023-06-22 VITALS
BODY MASS INDEX: 21.75 KG/M2 | WEIGHT: 138.6 LBS | DIASTOLIC BLOOD PRESSURE: 76 MMHG | RESPIRATION RATE: 20 BRPM | HEART RATE: 75 BPM | HEIGHT: 67 IN | SYSTOLIC BLOOD PRESSURE: 120 MMHG | OXYGEN SATURATION: 97 %

## 2023-06-22 DIAGNOSIS — H70.001 ACUTE MASTOIDITIS OF RIGHT SIDE: ICD-10-CM

## 2023-06-22 DIAGNOSIS — H66.93 BILATERAL OTITIS MEDIA, UNSPECIFIED OTITIS MEDIA TYPE: Primary | ICD-10-CM

## 2023-06-22 DIAGNOSIS — K21.9 GASTROESOPHAGEAL REFLUX DISEASE WITHOUT ESOPHAGITIS: ICD-10-CM

## 2023-06-22 PROCEDURE — 99214 OFFICE O/P EST MOD 30 MIN: CPT | Performed by: INTERNAL MEDICINE

## 2023-06-22 RX ORDER — CEFDINIR 300 MG/1
300 CAPSULE ORAL 2 TIMES DAILY
Qty: 20 CAPSULE | Refills: 0 | Status: SHIPPED | OUTPATIENT
Start: 2023-06-22 | End: 2023-07-02

## 2023-06-22 RX ORDER — PANTOPRAZOLE SODIUM 40 MG/1
40 TABLET, DELAYED RELEASE ORAL
Qty: 90 TABLET | Refills: 1 | Status: SHIPPED | OUTPATIENT
Start: 2023-06-22

## 2023-06-22 RX ORDER — BACLOFEN 10 MG/1
10 TABLET ORAL 3 TIMES DAILY
Qty: 30 TABLET | Refills: 1 | Status: SHIPPED | OUTPATIENT
Start: 2023-06-22

## 2023-06-22 NOTE — TELEPHONE ENCOUNTER
Upper chest discomfort. Heaviness and tightness. Pt stated  looked inside her ears and stated they were red.

## 2023-06-22 NOTE — TELEPHONE ENCOUNTER
Pt stated she had some type of virus and it has progressed. Pain in both mastoids. Headaches, acid reflux. Pavithra Dominguez Would like for you to se her. No sore throat or runny nose. Pain for 3 Weeks. No fever.

## 2023-06-25 ENCOUNTER — APPOINTMENT (OUTPATIENT)
Dept: GENERAL RADIOLOGY | Age: 58
End: 2023-06-25
Payer: COMMERCIAL

## 2023-06-25 ENCOUNTER — APPOINTMENT (OUTPATIENT)
Dept: CT IMAGING | Age: 58
End: 2023-06-25
Payer: COMMERCIAL

## 2023-06-25 ENCOUNTER — HOSPITAL ENCOUNTER (EMERGENCY)
Age: 58
Discharge: HOME | End: 2023-06-25
Attending: EMERGENCY MEDICINE
Payer: COMMERCIAL

## 2023-06-25 VITALS
DIASTOLIC BLOOD PRESSURE: 86 MMHG | HEIGHT: 67 IN | OXYGEN SATURATION: 98 % | TEMPERATURE: 98.2 F | HEART RATE: 66 BPM | SYSTOLIC BLOOD PRESSURE: 130 MMHG | WEIGHT: 135 LBS | RESPIRATION RATE: 11 BRPM | BODY MASS INDEX: 21.19 KG/M2

## 2023-06-25 DIAGNOSIS — R07.9 CHEST PAIN WITH LOW RISK FOR CARDIAC ETIOLOGY: Primary | ICD-10-CM

## 2023-06-25 DIAGNOSIS — K21.00 GASTROESOPHAGEAL REFLUX DISEASE WITH ESOPHAGITIS WITHOUT HEMORRHAGE: ICD-10-CM

## 2023-06-25 LAB
ALBUMIN SERPL-MCNC: 4.2 G/DL (ref 3.5–5.2)
ALP SERPL-CCNC: 59 U/L (ref 35–104)
ALT SERPL-CCNC: 11 U/L (ref 5–33)
ANION GAP SERPL CALCULATED.3IONS-SCNC: 10 MMOL/L (ref 7–19)
AST SERPL-CCNC: 15 U/L (ref 5–32)
BASOPHILS # BLD: 0 K/UL (ref 0–0.2)
BASOPHILS NFR BLD: 0.5 % (ref 0–1)
BILIRUB SERPL-MCNC: 0.5 MG/DL (ref 0.2–1.2)
BUN SERPL-MCNC: 8 MG/DL (ref 6–20)
CALCIUM SERPL-MCNC: 9.3 MG/DL (ref 8.6–10)
CHLORIDE SERPL-SCNC: 106 MMOL/L (ref 98–111)
CO2 SERPL-SCNC: 26 MMOL/L (ref 22–29)
CREAT SERPL-MCNC: 0.7 MG/DL (ref 0.5–0.9)
D DIMER PPP FEU-MCNC: 0.33 UG/ML FEU (ref 0–0.48)
EOSINOPHIL # BLD: 0 K/UL (ref 0–0.6)
EOSINOPHIL NFR BLD: 0.7 % (ref 0–5)
ERYTHROCYTE [DISTWIDTH] IN BLOOD BY AUTOMATED COUNT: 12.8 % (ref 11.5–14.5)
GLUCOSE SERPL-MCNC: 92 MG/DL (ref 74–109)
HCT VFR BLD AUTO: 42.9 % (ref 37–47)
HGB BLD-MCNC: 14.3 G/DL (ref 12–16)
IMM GRANULOCYTES # BLD: 0 K/UL
LIPASE SERPL-CCNC: 36 U/L (ref 13–60)
LYMPHOCYTES # BLD: 1 K/UL (ref 1.1–4.5)
LYMPHOCYTES NFR BLD: 23.1 % (ref 20–40)
MAGNESIUM SERPL-MCNC: 2.6 MG/DL (ref 1.6–2.6)
MCH RBC QN AUTO: 29.2 PG (ref 27–31)
MCHC RBC AUTO-ENTMCNC: 33.3 G/DL (ref 33–37)
MCV RBC AUTO: 87.7 FL (ref 81–99)
MONOCYTES # BLD: 0.4 K/UL (ref 0–0.9)
MONOCYTES NFR BLD: 8 % (ref 0–10)
NEUTROPHILS # BLD: 3 K/UL (ref 1.5–7.5)
NEUTS SEG NFR BLD: 67.5 % (ref 50–65)
PLATELET # BLD AUTO: 190 K/UL (ref 130–400)
PMV BLD AUTO: 9.4 FL (ref 9.4–12.3)
POTASSIUM SERPL-SCNC: 4.1 MMOL/L (ref 3.5–5)
PROT SERPL-MCNC: 7 G/DL (ref 6.6–8.7)
RBC # BLD AUTO: 4.89 M/UL (ref 4.2–5.4)
SODIUM SERPL-SCNC: 142 MMOL/L (ref 136–145)
TROPONIN T SERPL-MCNC: <0.01 NG/ML (ref 0–0.03)
WBC # BLD AUTO: 4.4 K/UL (ref 4.8–10.8)

## 2023-06-25 PROCEDURE — 74174 CTA ABD&PLVS W/CONTRAST: CPT

## 2023-06-25 PROCEDURE — 99285 EMERGENCY DEPT VISIT HI MDM: CPT

## 2023-06-25 PROCEDURE — 85379 FIBRIN DEGRADATION QUANT: CPT

## 2023-06-25 PROCEDURE — 71045 X-RAY EXAM CHEST 1 VIEW: CPT

## 2023-06-25 PROCEDURE — 96374 THER/PROPH/DIAG INJ IV PUSH: CPT

## 2023-06-25 PROCEDURE — 6370000000 HC RX 637 (ALT 250 FOR IP): Performed by: EMERGENCY MEDICINE

## 2023-06-25 PROCEDURE — 83690 ASSAY OF LIPASE: CPT

## 2023-06-25 PROCEDURE — 6360000004 HC RX CONTRAST MEDICATION: Performed by: EMERGENCY MEDICINE

## 2023-06-25 PROCEDURE — C9113 INJ PANTOPRAZOLE SODIUM, VIA: HCPCS | Performed by: EMERGENCY MEDICINE

## 2023-06-25 PROCEDURE — 84484 ASSAY OF TROPONIN QUANT: CPT

## 2023-06-25 PROCEDURE — 93005 ELECTROCARDIOGRAM TRACING: CPT | Performed by: EMERGENCY MEDICINE

## 2023-06-25 PROCEDURE — 36415 COLL VENOUS BLD VENIPUNCTURE: CPT

## 2023-06-25 PROCEDURE — 71275 CT ANGIOGRAPHY CHEST: CPT

## 2023-06-25 PROCEDURE — 83735 ASSAY OF MAGNESIUM: CPT

## 2023-06-25 PROCEDURE — 85025 COMPLETE CBC W/AUTO DIFF WBC: CPT

## 2023-06-25 PROCEDURE — 80053 COMPREHEN METABOLIC PANEL: CPT

## 2023-06-25 PROCEDURE — 2580000003 HC RX 258: Performed by: EMERGENCY MEDICINE

## 2023-06-25 PROCEDURE — 6360000002 HC RX W HCPCS: Performed by: EMERGENCY MEDICINE

## 2023-06-25 RX ORDER — CALCIUM CARBONATE 500 MG/1
1 TABLET, CHEWABLE ORAL DAILY
Qty: 30 TABLET | Refills: 0 | Status: SHIPPED | OUTPATIENT
Start: 2023-06-25 | End: 2023-07-25

## 2023-06-25 RX ORDER — HYOSCYAMINE SULFATE 0.12 MG/1
0.12 TABLET SUBLINGUAL
Qty: 30 EACH | Refills: 0 | Status: SHIPPED | OUTPATIENT
Start: 2023-06-25

## 2023-06-25 RX ORDER — LIDOCAINE HYDROCHLORIDE 20 MG/ML
10 SOLUTION OROPHARYNGEAL
Qty: 100 ML | Refills: 1 | Status: SHIPPED | OUTPATIENT
Start: 2023-06-25

## 2023-06-25 RX ADMIN — IOPAMIDOL 70 ML: 755 INJECTION, SOLUTION INTRAVENOUS at 09:57

## 2023-06-25 RX ADMIN — SODIUM CHLORIDE, PRESERVATIVE FREE 40 MG: 5 INJECTION INTRAVENOUS at 09:23

## 2023-06-25 RX ADMIN — ALUMINUM HYDROXIDE, MAGNESIUM HYDROXIDE, AND SIMETHICONE: 200; 200; 20 SUSPENSION ORAL at 11:25

## 2023-06-25 ASSESSMENT — ENCOUNTER SYMPTOMS
RESPIRATORY NEGATIVE: 1
ABDOMINAL PAIN: 1
EYES NEGATIVE: 1

## 2023-06-26 ENCOUNTER — OFFICE VISIT (OUTPATIENT)
Dept: INTERNAL MEDICINE | Age: 58
End: 2023-06-26
Payer: COMMERCIAL

## 2023-06-26 ENCOUNTER — TELEMEDICINE (OUTPATIENT)
Dept: GASTROENTEROLOGY | Age: 58
End: 2023-06-26
Payer: COMMERCIAL

## 2023-06-26 ENCOUNTER — CARE COORDINATION (OUTPATIENT)
Dept: OTHER | Facility: CLINIC | Age: 58
End: 2023-06-26

## 2023-06-26 ENCOUNTER — TELEPHONE (OUTPATIENT)
Dept: INTERNAL MEDICINE | Age: 58
End: 2023-06-26

## 2023-06-26 VITALS
WEIGHT: 134.6 LBS | OXYGEN SATURATION: 96 % | RESPIRATION RATE: 20 BRPM | BODY MASS INDEX: 21.12 KG/M2 | DIASTOLIC BLOOD PRESSURE: 82 MMHG | HEIGHT: 67 IN | SYSTOLIC BLOOD PRESSURE: 136 MMHG | HEART RATE: 68 BPM

## 2023-06-26 DIAGNOSIS — K22.89 ESOPHAGEAL PAIN: ICD-10-CM

## 2023-06-26 DIAGNOSIS — K21.9 GASTROESOPHAGEAL REFLUX DISEASE WITHOUT ESOPHAGITIS: Primary | ICD-10-CM

## 2023-06-26 DIAGNOSIS — R12 CHRONIC HEARTBURN: Primary | ICD-10-CM

## 2023-06-26 DIAGNOSIS — H92.03 ACUTE EAR PAIN, BILATERAL: ICD-10-CM

## 2023-06-26 DIAGNOSIS — K30 INDIGESTION: ICD-10-CM

## 2023-06-26 DIAGNOSIS — R10.13 DYSPEPSIA: ICD-10-CM

## 2023-06-26 DIAGNOSIS — F41.9 ANXIETY DISORDER, UNSPECIFIED TYPE: ICD-10-CM

## 2023-06-26 LAB
EKG P AXIS: 82 DEGREES
EKG P-R INTERVAL: 150 MS
EKG Q-T INTERVAL: 374 MS
EKG QRS DURATION: 98 MS
EKG QTC CALCULATION (BAZETT): 410 MS
EKG T AXIS: 76 DEGREES

## 2023-06-26 PROCEDURE — 99213 OFFICE O/P EST LOW 20 MIN: CPT | Performed by: INTERNAL MEDICINE

## 2023-06-26 PROCEDURE — 99214 OFFICE O/P EST MOD 30 MIN: CPT | Performed by: NURSE PRACTITIONER

## 2023-06-26 PROCEDURE — 93010 ELECTROCARDIOGRAM REPORT: CPT | Performed by: INTERNAL MEDICINE

## 2023-06-26 RX ORDER — CLONAZEPAM 0.25 MG/1
0.25 TABLET, ORALLY DISINTEGRATING ORAL 2 TIMES DAILY PRN
Qty: 10 TABLET | Refills: 0 | Status: SHIPPED | OUTPATIENT
Start: 2023-06-26 | End: 2023-07-01

## 2023-06-26 RX ORDER — ESCITALOPRAM OXALATE 5 MG/1
5 TABLET ORAL DAILY
Qty: 30 TABLET | Refills: 5 | Status: SHIPPED | OUTPATIENT
Start: 2023-06-26

## 2023-06-26 ASSESSMENT — ENCOUNTER SYMPTOMS
ANAL BLEEDING: 0
ABDOMINAL DISTENTION: 0
VOMITING: 0
ABDOMINAL PAIN: 1
RECTAL PAIN: 0
DIARRHEA: 0
CONSTIPATION: 0
COUGH: 0
TROUBLE SWALLOWING: 0
BLOOD IN STOOL: 0
SHORTNESS OF BREATH: 0
CHOKING: 0
NAUSEA: 0

## 2023-06-27 ENCOUNTER — HOSPITAL ENCOUNTER (OUTPATIENT)
Age: 58
Setting detail: SPECIMEN
Discharge: HOME | End: 2023-06-27
Payer: COMMERCIAL

## 2023-06-27 ENCOUNTER — ANESTHESIA (OUTPATIENT)
Dept: OPERATING ROOM | Age: 58
End: 2023-06-27

## 2023-06-27 ENCOUNTER — ANESTHESIA EVENT (OUTPATIENT)
Dept: OPERATING ROOM | Age: 58
End: 2023-06-27

## 2023-06-27 ENCOUNTER — APPOINTMENT (OUTPATIENT)
Dept: OPERATING ROOM | Age: 58
End: 2023-06-27
Attending: INTERNAL MEDICINE

## 2023-06-27 ENCOUNTER — HOSPITAL ENCOUNTER (OUTPATIENT)
Age: 58
Setting detail: OUTPATIENT SURGERY
Discharge: HOME | End: 2023-06-27
Attending: INTERNAL MEDICINE | Admitting: INTERNAL MEDICINE
Payer: COMMERCIAL

## 2023-06-27 VITALS
HEIGHT: 67 IN | SYSTOLIC BLOOD PRESSURE: 115 MMHG | HEART RATE: 52 BPM | WEIGHT: 135 LBS | BODY MASS INDEX: 21.19 KG/M2 | OXYGEN SATURATION: 99 % | DIASTOLIC BLOOD PRESSURE: 76 MMHG | RESPIRATION RATE: 16 BRPM | TEMPERATURE: 97.1 F

## 2023-06-27 PROCEDURE — 43239 EGD BIOPSY SINGLE/MULTIPLE: CPT | Performed by: INTERNAL MEDICINE

## 2023-06-27 PROCEDURE — 43239 EGD BIOPSY SINGLE/MULTIPLE: CPT

## 2023-06-27 PROCEDURE — 88305 TISSUE EXAM BY PATHOLOGIST: CPT

## 2023-06-27 RX ORDER — SODIUM CHLORIDE, SODIUM LACTATE, POTASSIUM CHLORIDE, CALCIUM CHLORIDE 600; 310; 30; 20 MG/100ML; MG/100ML; MG/100ML; MG/100ML
INJECTION, SOLUTION INTRAVENOUS CONTINUOUS
Status: DISCONTINUED | OUTPATIENT
Start: 2023-06-27 | End: 2023-06-27 | Stop reason: HOSPADM

## 2023-06-27 RX ORDER — ONDANSETRON 2 MG/ML
4 INJECTION INTRAMUSCULAR; INTRAVENOUS
Status: CANCELLED | OUTPATIENT
Start: 2023-06-27 | End: 2023-06-28

## 2023-06-27 RX ORDER — LIDOCAINE HYDROCHLORIDE 10 MG/ML
INJECTION, SOLUTION EPIDURAL; INFILTRATION; INTRACAUDAL; PERINEURAL PRN
Status: DISCONTINUED | OUTPATIENT
Start: 2023-06-27 | End: 2023-06-27 | Stop reason: SDUPTHER

## 2023-06-27 RX ORDER — PROPOFOL 10 MG/ML
INJECTION, EMULSION INTRAVENOUS PRN
Status: DISCONTINUED | OUTPATIENT
Start: 2023-06-27 | End: 2023-06-27 | Stop reason: SDUPTHER

## 2023-06-27 RX ADMIN — LIDOCAINE HYDROCHLORIDE 30 MG: 10 INJECTION, SOLUTION EPIDURAL; INFILTRATION; INTRACAUDAL; PERINEURAL at 11:25

## 2023-06-27 RX ADMIN — PROPOFOL 10 MG: 10 INJECTION, EMULSION INTRAVENOUS at 11:25

## 2023-06-27 RX ADMIN — SODIUM CHLORIDE, SODIUM LACTATE, POTASSIUM CHLORIDE, CALCIUM CHLORIDE: 600; 310; 30; 20 INJECTION, SOLUTION INTRAVENOUS at 10:15

## 2023-06-28 ENCOUNTER — CARE COORDINATION (OUTPATIENT)
Dept: OTHER | Facility: CLINIC | Age: 58
End: 2023-06-28

## 2023-06-29 SDOH — ECONOMIC STABILITY: INCOME INSECURITY: HOW HARD IS IT FOR YOU TO PAY FOR THE VERY BASICS LIKE FOOD, HOUSING, MEDICAL CARE, AND HEATING?: NOT HARD AT ALL

## 2023-06-29 SDOH — ECONOMIC STABILITY: FOOD INSECURITY: WITHIN THE PAST 12 MONTHS, THE FOOD YOU BOUGHT JUST DIDN'T LAST AND YOU DIDN'T HAVE MONEY TO GET MORE.: NEVER TRUE

## 2023-06-29 SDOH — ECONOMIC STABILITY: FOOD INSECURITY: WITHIN THE PAST 12 MONTHS, YOU WORRIED THAT YOUR FOOD WOULD RUN OUT BEFORE YOU GOT MONEY TO BUY MORE.: NEVER TRUE

## 2023-06-29 ASSESSMENT — ENCOUNTER SYMPTOMS
WHEEZING: 0
DIARRHEA: 0
RHINORRHEA: 0
FACIAL SWELLING: 0
EYE DISCHARGE: 0
VOICE CHANGE: 0
CHOKING: 0
VOMITING: 0
ANAL BLEEDING: 0
CONSTIPATION: 0
RECTAL PAIN: 0
COUGH: 0
SHORTNESS OF BREATH: 0
ABDOMINAL PAIN: 1
NAUSEA: 1
TROUBLE SWALLOWING: 0
COLOR CHANGE: 0
SINUS PRESSURE: 0
EYE ITCHING: 0
ABDOMINAL DISTENTION: 0
EYE PAIN: 0
BACK PAIN: 1
CHEST TIGHTNESS: 0
EYE REDNESS: 0
SORE THROAT: 0
PHOTOPHOBIA: 0
BLOOD IN STOOL: 0

## 2023-06-29 ASSESSMENT — PATIENT HEALTH QUESTIONNAIRE - PHQ9
SUM OF ALL RESPONSES TO PHQ QUESTIONS 1-9: 0
SUM OF ALL RESPONSES TO PHQ QUESTIONS 1-9: 0
SUM OF ALL RESPONSES TO PHQ9 QUESTIONS 1 & 2: 0
SUM OF ALL RESPONSES TO PHQ QUESTIONS 1-9: 0
2. FEELING DOWN, DEPRESSED OR HOPELESS: 0
1. LITTLE INTEREST OR PLEASURE IN DOING THINGS: 0
SUM OF ALL RESPONSES TO PHQ QUESTIONS 1-9: 0

## 2023-06-30 ENCOUNTER — TELEPHONE (OUTPATIENT)
Dept: INTERNAL MEDICINE | Age: 58
End: 2023-06-30

## 2023-06-30 DIAGNOSIS — H83.3X3 SOUND SENSITIVITY IN BOTH EARS: Primary | ICD-10-CM

## 2023-06-30 DIAGNOSIS — H92.03 EAR PAIN, BILATERAL: ICD-10-CM

## 2023-06-30 RX ORDER — AZITHROMYCIN 250 MG/1
250 TABLET, FILM COATED ORAL SEE ADMIN INSTRUCTIONS
Qty: 6 TABLET | Refills: 0 | Status: SHIPPED | OUTPATIENT
Start: 2023-06-30 | End: 2023-07-05

## 2023-07-01 ASSESSMENT — ENCOUNTER SYMPTOMS
WHEEZING: 0
CHEST TIGHTNESS: 0
EYE PAIN: 0
RECTAL PAIN: 0
BACK PAIN: 1
CHOKING: 0
ABDOMINAL DISTENTION: 0
PHOTOPHOBIA: 0
SHORTNESS OF BREATH: 0
EYE DISCHARGE: 0
VOICE CHANGE: 0
FACIAL SWELLING: 0
CONSTIPATION: 0
EYE REDNESS: 0
COUGH: 0
RHINORRHEA: 0
COLOR CHANGE: 0
NAUSEA: 1
DIARRHEA: 0
SORE THROAT: 0
ABDOMINAL PAIN: 1
ANAL BLEEDING: 0
SINUS PRESSURE: 0
EYE ITCHING: 0
BLOOD IN STOOL: 0
VOMITING: 0
TROUBLE SWALLOWING: 0

## 2023-07-10 ENCOUNTER — OFFICE VISIT (OUTPATIENT)
Dept: ENT CLINIC | Age: 58
End: 2023-07-10
Payer: COMMERCIAL

## 2023-07-10 ENCOUNTER — PROCEDURE VISIT (OUTPATIENT)
Dept: ENT CLINIC | Age: 58
End: 2023-07-10
Payer: COMMERCIAL

## 2023-07-10 VITALS
HEIGHT: 67 IN | BODY MASS INDEX: 21.19 KG/M2 | SYSTOLIC BLOOD PRESSURE: 116 MMHG | DIASTOLIC BLOOD PRESSURE: 68 MMHG | WEIGHT: 135 LBS

## 2023-07-10 DIAGNOSIS — H60.543 DERMATITIS OF BOTH EAR CANALS: Primary | ICD-10-CM

## 2023-07-10 DIAGNOSIS — S03.00XA DISLOCATION OF TEMPOROMANDIBULAR JOINT, INITIAL ENCOUNTER: ICD-10-CM

## 2023-07-10 DIAGNOSIS — H90.3 SENSORINEURAL HEARING LOSS (SNHL) OF BOTH EARS: ICD-10-CM

## 2023-07-10 DIAGNOSIS — H92.03 OTALGIA OF BOTH EARS: Primary | ICD-10-CM

## 2023-07-10 PROCEDURE — 92553 AUDIOMETRY AIR & BONE: CPT | Performed by: AUDIOLOGIST

## 2023-07-10 PROCEDURE — 99203 OFFICE O/P NEW LOW 30 MIN: CPT | Performed by: OTOLARYNGOLOGY

## 2023-07-10 PROCEDURE — 92567 TYMPANOMETRY: CPT | Performed by: AUDIOLOGIST

## 2023-07-10 RX ORDER — FLUOCINOLONE ACETONIDE 0.11 MG/ML
5 OIL AURICULAR (OTIC) 2 TIMES DAILY
Qty: 20 ML | Refills: 2 | Status: SHIPPED | OUTPATIENT
Start: 2023-07-10

## 2023-07-10 ASSESSMENT — ENCOUNTER SYMPTOMS
RESPIRATORY NEGATIVE: 1
GASTROINTESTINAL NEGATIVE: 1
ALLERGIC/IMMUNOLOGIC NEGATIVE: 1
EYES NEGATIVE: 1

## 2023-07-10 NOTE — PROGRESS NOTES
7/10/2023    Campbell Posadas (:  1965) is a 65 y.o. female, Established patient, here for evaluation of the following chief complaint(s):  New Patient (Enlarged lymph nodes (right neck), ears)      Vitals:    07/10/23 1010   BP: 116/68   Weight: 135 lb (61.2 kg)   Height: 5' 7\" (1.702 m)       Wt Readings from Last 3 Encounters:   07/10/23 135 lb (61.2 kg)   23 135 lb (61.2 kg)   23 134 lb 9.6 oz (61.1 kg)       BP Readings from Last 3 Encounters:   07/10/23 116/68   23 115/76   23 136/82         SUBJECTIVE/OBJECTIVE:    Patient seen today for several issues. She says she has been suffering from frequent ear pain bilaterally along with itching. She also complains of a large lymph node on the right and she had a CT scan which showed a slightly enlarged lymph node. She says that is going down. She also reports sensitivity to loud sounds that is improved as well. Hearing test today looks good. Type a tympanograms. She complains of mastoid pain which is just behind and below her ear. Denies any popping in her jaw. Review of Systems   Constitutional: Negative. HENT:  Positive for ear pain. Ear itching     Eyes: Negative. Respiratory: Negative. Cardiovascular: Negative. Gastrointestinal: Negative. Endocrine: Negative. Musculoskeletal:  Positive for arthralgias. Skin: Negative. Allergic/Immunologic: Negative. Neurological: Negative. Hematological: Negative. Psychiatric/Behavioral: Negative. Physical Exam  Vitals reviewed. Constitutional:       Appearance: Normal appearance. She is normal weight. HENT:      Head: Normocephalic and atraumatic. Right Ear: Tympanic membrane, ear canal and external ear normal.      Left Ear: Tympanic membrane, ear canal and external ear normal.      Nose: Nose normal.      Mouth/Throat:      Mouth: Mucous membranes are moist.      Pharynx: Oropharynx is clear.    Eyes:      Extraocular

## 2023-07-10 NOTE — PROGRESS NOTES
History   Kadie Polanco is a 65 y.o. female who presented to the clinic this date with complaints of bilateral ear pain, muffled hearing, sound sensitivity, and increased tinnitus. In early June she had a swollen lymph node behind right ear with pain down her neck. An ultrasound was inconclusive. She had a follow up CT of her neck which showed a couple of swollen lymph nodes. She also had abnormal blood work which returned to normal on follow up. A few weeks later she began to have pain behind her ears in the mastoid area. She was treated for right middle ear effusion. She continued to have continued face and jaw pain on the right side. She has not had previous ear complaints. She has been seen by her dentist and had normal X-rays. Summary   Results obtained today are consistent with normal TM mobility and mild high frequency SNHL bilaterally. Results   Otoscopy:   Right: White residue on TM  Left: Clear EAC/Normal TM    Audiometry:   Right: Mild high frequency SNHL  Left: Mild high frequency SNHL         Tympanometry:    Right: Type A  Left: Type A    Plan   Results of today's testing were discussed with Ms. Mini Clemens and the following recommendations were made: Follow up with ENT as scheduled. Monitor hearing yearly, sooner with changes. Hearing protection as warranted.         Audiogram and Acoustic Immittance

## 2023-08-22 ENCOUNTER — TELEPHONE (OUTPATIENT)
Dept: INTERNAL MEDICINE | Age: 58
End: 2023-08-22

## 2023-08-22 RX ORDER — ESCITALOPRAM OXALATE 10 MG/1
10 TABLET ORAL DAILY
Qty: 30 TABLET | Refills: 2 | Status: SHIPPED | OUTPATIENT
Start: 2023-08-22

## 2023-08-22 NOTE — TELEPHONE ENCOUNTER
Patient called requesting an increase in lexapro to 10mg. States helping but does not feel it is at max potential. Please advise.

## 2023-08-31 RX ORDER — ERGOCALCIFEROL 1.25 MG/1
CAPSULE ORAL
Qty: 12 CAPSULE | Refills: 3 | Status: SHIPPED | OUTPATIENT
Start: 2023-08-31

## 2023-10-03 ENCOUNTER — HOSPITAL ENCOUNTER (OUTPATIENT)
Dept: WOMENS IMAGING | Age: 58
Discharge: HOME | End: 2023-10-03
Payer: COMMERCIAL

## 2023-10-03 ENCOUNTER — TELEPHONE (OUTPATIENT)
Dept: INTERNAL MEDICINE | Age: 58
End: 2023-10-03

## 2023-10-03 VITALS — BODY MASS INDEX: 21.93 KG/M2 | WEIGHT: 140 LBS

## 2023-10-03 DIAGNOSIS — Z12.31 ENCOUNTER FOR SCREENING MAMMOGRAM FOR MALIGNANT NEOPLASM OF BREAST: ICD-10-CM

## 2023-10-03 DIAGNOSIS — E03.9 HYPOTHYROIDISM, UNSPECIFIED TYPE: Primary | ICD-10-CM

## 2023-10-03 PROCEDURE — 77063 BREAST TOMOSYNTHESIS BI: CPT

## 2023-10-03 RX ORDER — LEVOTHYROXINE SODIUM 0.1 MG/1
100 TABLET ORAL DAILY
Qty: 90 TABLET | Refills: 2 | Status: SHIPPED | OUTPATIENT
Start: 2023-10-03

## 2023-10-17 ENCOUNTER — OFFICE VISIT (OUTPATIENT)
Dept: ENT CLINIC | Age: 58
End: 2023-10-17
Payer: COMMERCIAL

## 2023-10-17 VITALS
SYSTOLIC BLOOD PRESSURE: 128 MMHG | BODY MASS INDEX: 22.91 KG/M2 | HEIGHT: 67 IN | DIASTOLIC BLOOD PRESSURE: 84 MMHG | WEIGHT: 146 LBS

## 2023-10-17 DIAGNOSIS — Z91.09 ENVIRONMENTAL ALLERGIES: ICD-10-CM

## 2023-10-17 DIAGNOSIS — H73.20 MYRINGITIS: Primary | ICD-10-CM

## 2023-10-17 DIAGNOSIS — S03.00XD DISLOCATION OF TEMPOROMANDIBULAR JOINT, SUBSEQUENT ENCOUNTER: ICD-10-CM

## 2023-10-17 PROCEDURE — 92504 EAR MICROSCOPY EXAMINATION: CPT | Performed by: OTOLARYNGOLOGY

## 2023-10-17 PROCEDURE — 99214 OFFICE O/P EST MOD 30 MIN: CPT | Performed by: OTOLARYNGOLOGY

## 2023-10-17 RX ORDER — MELOXICAM 7.5 MG/1
7.5 TABLET ORAL DAILY PRN
Qty: 30 TABLET | Refills: 2 | Status: SHIPPED | OUTPATIENT
Start: 2023-10-17

## 2023-10-17 ASSESSMENT — ENCOUNTER SYMPTOMS
EYES NEGATIVE: 1
ALLERGIC/IMMUNOLOGIC NEGATIVE: 1
GASTROINTESTINAL NEGATIVE: 1
RESPIRATORY NEGATIVE: 1

## 2023-10-17 NOTE — PROGRESS NOTES
10/17/2023    Francisca Neal (:  1965) is a 63 y.o. female, Established patient, here for evaluation of the following chief complaint(s):  Follow-up (Ears)      Vitals:    10/17/23 1314   BP: 128/84   Weight: 146 lb (66.2 kg)   Height: 5' 7\" (1.702 m)       Wt Readings from Last 3 Encounters:   10/17/23 146 lb (66.2 kg)   10/03/23 140 lb (63.5 kg)   07/10/23 135 lb (61.2 kg)       BP Readings from Last 3 Encounters:   10/17/23 128/84   07/10/23 116/68   23 115/76         SUBJECTIVE/OBJECTIVE:    Patient seen today for her ears and her allergies. She says she still has cracking and popping in her ears and no longer uses the Derm otic because she been traveling so much and leads a greasy feeling in her ears. She also complains of occasional nasal congestion as well as TMJ. She is allergic to Celebrex so I avoided putting her on meloxicam.  She says she can tolerate Advil and other NSAIDs without an issue. The Celebrex issue was a long time ago. Review of Systems   Constitutional: Negative. HENT: Negative. Ear crackling   Eyes: Negative. Respiratory: Negative. Cardiovascular: Negative. Gastrointestinal: Negative. Endocrine: Negative. Musculoskeletal:  Positive for arthralgias. Skin: Negative. Allergic/Immunologic: Negative. Neurological: Negative. Hematological: Negative. Psychiatric/Behavioral: Negative. Physical Exam  Vitals reviewed. Constitutional:       Appearance: Normal appearance. She is normal weight. HENT:      Head: Normocephalic and atraumatic. Right Ear: Tympanic membrane, ear canal and external ear normal.      Left Ear: Tympanic membrane, ear canal and external ear normal.      Ears:      Comments: Heaped up epithelial layer on both TMs     Nose: Nose normal.      Mouth/Throat:      Mouth: Mucous membranes are moist.      Pharynx: Oropharynx is clear.    Eyes:      Extraocular Movements: Extraocular movements

## 2023-11-20 ENCOUNTER — OFFICE VISIT (OUTPATIENT)
Dept: ENT CLINIC | Age: 58
End: 2023-11-20
Payer: COMMERCIAL

## 2023-11-20 VITALS
SYSTOLIC BLOOD PRESSURE: 122 MMHG | DIASTOLIC BLOOD PRESSURE: 78 MMHG | BODY MASS INDEX: 23.07 KG/M2 | WEIGHT: 147 LBS | HEIGHT: 67 IN

## 2023-11-20 DIAGNOSIS — H73.20 MYRINGITIS: Primary | ICD-10-CM

## 2023-11-20 DIAGNOSIS — S03.00XD DISLOCATION OF TEMPOROMANDIBULAR JOINT, SUBSEQUENT ENCOUNTER: ICD-10-CM

## 2023-11-20 PROCEDURE — 92504 EAR MICROSCOPY EXAMINATION: CPT | Performed by: OTOLARYNGOLOGY

## 2023-11-20 PROCEDURE — 99213 OFFICE O/P EST LOW 20 MIN: CPT | Performed by: OTOLARYNGOLOGY

## 2023-11-20 RX ORDER — OFLOXACIN 3 MG/ML
5 SOLUTION AURICULAR (OTIC) 2 TIMES DAILY
Qty: 5 ML | Refills: 0 | Status: SHIPPED | OUTPATIENT
Start: 2023-11-20 | End: 2023-11-23

## 2023-11-20 ASSESSMENT — ENCOUNTER SYMPTOMS
GASTROINTESTINAL NEGATIVE: 1
EYES NEGATIVE: 1
ALLERGIC/IMMUNOLOGIC NEGATIVE: 1
RESPIRATORY NEGATIVE: 1

## 2023-11-20 NOTE — PROGRESS NOTES
2023    Carin Ren (:  1965) is a 63 y.o. female, Established patient, here for evaluation of the following chief complaint(s):  Follow-up (Ears, TMJ)      Vitals:    23 0837   BP: 122/78   Weight: 66.7 kg (147 lb)   Height: 1.702 m (5' 7\")       Wt Readings from Last 3 Encounters:   23 66.7 kg (147 lb)   10/17/23 66.2 kg (146 lb)   10/03/23 63.5 kg (140 lb)       BP Readings from Last 3 Encounters:   23 122/78   10/17/23 128/84   07/10/23 116/68         SUBJECTIVE/OBJECTIVE:    Patient seen today for her right ear and TMJ on the left. I placed her on a low-dose of meloxicam to the TMJ resolved and she does not take the medications anymore. The drops did help her right ear but she still feels some crackling on that side. Overall she is doing slightly better. Review of Systems   Constitutional: Negative. HENT: Negative. Eyes: Negative. Respiratory: Negative. Cardiovascular: Negative. Gastrointestinal: Negative. Endocrine: Negative. Musculoskeletal: Negative. Skin: Negative. Allergic/Immunologic: Negative. Neurological: Negative. Hematological: Negative. Psychiatric/Behavioral: Negative. Physical Exam  Vitals reviewed. Constitutional:       Appearance: Normal appearance. She is normal weight. HENT:      Head: Normocephalic and atraumatic. Right Ear: Tympanic membrane, ear canal and external ear normal.      Left Ear: Tympanic membrane, ear canal and external ear normal.      Ears:      Comments: Debris on right TM     Nose: Nose normal.      Mouth/Throat:      Mouth: Mucous membranes are moist.      Pharynx: Oropharynx is clear. Eyes:      Extraocular Movements: Extraocular movements intact. Pupils: Pupils are equal, round, and reactive to light. Cardiovascular:      Rate and Rhythm: Normal rate and regular rhythm.    Pulmonary:      Effort: Pulmonary effort is normal.      Breath sounds: Normal breath

## 2023-12-05 RX ORDER — PANTOPRAZOLE SODIUM 40 MG/1
TABLET, DELAYED RELEASE ORAL
Qty: 90 TABLET | Refills: 1 | Status: SHIPPED | OUTPATIENT
Start: 2023-12-05

## 2023-12-05 NOTE — TELEPHONE ENCOUNTER
Narinder Osborn called requesting a refill of the below medication which has been pended for you:     Requested Prescriptions     Pending Prescriptions Disp Refills    pantoprazole (PROTONIX) 40 MG tablet [Pharmacy Med Name: PANTOPRAZOLE SODIUM 40MG TBEC] 90 tablet 1     Sig: TAKE ONE TABLET EVERY [facility]       Last Appointment Date: 6/26/2023  Next Appointment Date: 12/14/2023    Allergies   Allergen Reactions    Celebrex [Celecoxib] Anaphylaxis    Nsaids

## 2023-12-08 ENCOUNTER — OFFICE VISIT (OUTPATIENT)
Dept: INTERNAL MEDICINE | Age: 58
End: 2023-12-08
Payer: COMMERCIAL

## 2023-12-08 VITALS
DIASTOLIC BLOOD PRESSURE: 72 MMHG | SYSTOLIC BLOOD PRESSURE: 124 MMHG | HEART RATE: 82 BPM | RESPIRATION RATE: 18 BRPM | HEIGHT: 67 IN | BODY MASS INDEX: 23.39 KG/M2 | OXYGEN SATURATION: 98 % | WEIGHT: 149 LBS

## 2023-12-08 DIAGNOSIS — R09.89 CHEST CONGESTION: Primary | ICD-10-CM

## 2023-12-08 DIAGNOSIS — R05.9 COUGH, UNSPECIFIED TYPE: ICD-10-CM

## 2023-12-08 DIAGNOSIS — R09.81 SINUS CONGESTION: ICD-10-CM

## 2023-12-08 LAB
INFLUENZA A ANTIGEN, POC: NEGATIVE
INFLUENZA B ANTIGEN, POC: NEGATIVE
LOT EXPIRE DATE: NORMAL
LOT KIT NUMBER: NORMAL
S PYO AG THROAT QL: NORMAL
SARS-COV-2, POC: NORMAL
VALID INTERNAL CONTROL: NORMAL
VENDOR AND KIT NAME POC: NORMAL

## 2023-12-08 PROCEDURE — 99213 OFFICE O/P EST LOW 20 MIN: CPT | Performed by: INTERNAL MEDICINE

## 2023-12-08 RX ORDER — CEFDINIR 300 MG/1
300 CAPSULE ORAL 2 TIMES DAILY
Qty: 20 CAPSULE | Refills: 0 | Status: SHIPPED | OUTPATIENT
Start: 2023-12-08 | End: 2023-12-18

## 2023-12-08 RX ORDER — DEXTROMETHORPHAN HYDROBROMIDE AND PROMETHAZINE HYDROCHLORIDE 15; 6.25 MG/5ML; MG/5ML
5 SYRUP ORAL 4 TIMES DAILY PRN
Qty: 120 ML | Refills: 0 | Status: SHIPPED | OUTPATIENT
Start: 2023-12-08 | End: 2023-12-15

## 2023-12-08 RX ORDER — METHYLPREDNISOLONE 4 MG/1
TABLET ORAL
Qty: 1 KIT | Refills: 0 | Status: SHIPPED | OUTPATIENT
Start: 2023-12-08 | End: 2023-12-14

## 2023-12-08 NOTE — PROGRESS NOTES
Chief Complaint   Patient presents with    Sinusitis       HPI: Antonia Kemp is a 63 y.o. female is here for evaluation of cough and chest congestion. Her symptoms started on Friday. She started taking NyQuil. However, she did develop a sore throat and bloody nasal drainage. She has been coughing up some greenish material.  Her chest feels tight. She has had some chills and bodyaches. She is not able to sleep. She complains of headaches and facial pressure. A flu, COVID swab and strep swab are negative here in the office today. She is coughing to the point of not being able to sleep. She thinks that she has had a fever. However, she has not actually taken her temperature.     Past Medical History:   Diagnosis Date    Acid reflux     Anemia     BRCA negative     Chronic back pain     GERD (gastroesophageal reflux disease)     Hypothyroidism     Irritable bowel syndrome     Premature atrial contraction     Skipped heart beats     Thyroid disease     Thyroid mass     Vitamin D deficiency       Past Surgical History:   Procedure Laterality Date    BREAST BIOPSY Right 2012    benign    BREAST ENHANCEMENT SURGERY Bilateral 1999    saline,retro-pectoral    BREAST SURGERY      Augmentation,Biopsy+    CHOLECYSTECTOMY      COLONOSCOPY  05/25/2005    Dr Letty Figueroa, no active colitis    COLONOSCOPY  12/12/2012    Dr Jayda Llanes colon    COLONOSCOPY N/A 04/22/2021    Dr Gisella Martínez, 5 yr recall    HEMORRHOID SURGERY      THYROID SURGERY      Biopsy    THYROID SURGERY      nodule removed    TONSILLECTOMY      TUBAL LIGATION      UPPER GASTROINTESTINAL ENDOSCOPY  05/25/2005    Dr Jonnathan Dutton celiac, gastritis    UPPER GASTROINTESTINAL ENDOSCOPY  02/12/2015    Dr Bill Merino neg, mild antral gastritis    UPPER GASTROINTESTINAL ENDOSCOPY N/A 08/05/2019    Dr Canelo Verdugo exam, NEG EoE    UPPER GASTROINTESTINAL ENDOSCOPY  04/21/2022    Dr Marcus Escamilla-Esophagitis vs Guaman's, gastritis    UPPER GASTROINTESTINAL

## 2023-12-13 DIAGNOSIS — E55.9 VITAMIN D DEFICIENCY: ICD-10-CM

## 2023-12-13 DIAGNOSIS — E78.5 HYPERLIPIDEMIA, UNSPECIFIED HYPERLIPIDEMIA TYPE: ICD-10-CM

## 2023-12-13 DIAGNOSIS — R73.9 HYPERGLYCEMIA: ICD-10-CM

## 2023-12-13 DIAGNOSIS — E34.9 HORMONE DISORDER: ICD-10-CM

## 2023-12-13 LAB
25(OH)D3 SERPL-MCNC: 38 NG/ML
ALBUMIN SERPL-MCNC: 4.4 G/DL (ref 3.5–5.2)
ALP SERPL-CCNC: 85 U/L (ref 35–104)
ALT SERPL-CCNC: 12 U/L (ref 5–33)
ANION GAP SERPL CALCULATED.3IONS-SCNC: 13 MMOL/L (ref 7–19)
AST SERPL-CCNC: 15 U/L (ref 5–32)
BASOPHILS # BLD: 0 K/UL (ref 0–0.2)
BASOPHILS NFR BLD: 0.6 % (ref 0–1)
BILIRUB SERPL-MCNC: 0.3 MG/DL (ref 0.2–1.2)
BUN SERPL-MCNC: 12 MG/DL (ref 6–20)
CALCIUM SERPL-MCNC: 9.8 MG/DL (ref 8.6–10)
CHLORIDE SERPL-SCNC: 101 MMOL/L (ref 98–111)
CHOLEST SERPL-MCNC: 239 MG/DL (ref 160–199)
CO2 SERPL-SCNC: 27 MMOL/L (ref 22–29)
CORTIS AM PEAK SERPL-MCNC: 3.6 UG/DL (ref 6.2–19.4)
CREAT SERPL-MCNC: 0.6 MG/DL (ref 0.5–0.9)
EOSINOPHIL # BLD: 0.1 K/UL (ref 0–0.6)
EOSINOPHIL NFR BLD: 1.2 % (ref 0–5)
ERYTHROCYTE [DISTWIDTH] IN BLOOD BY AUTOMATED COUNT: 12.6 % (ref 11.5–14.5)
GLUCOSE SERPL-MCNC: 86 MG/DL (ref 74–109)
HBA1C MFR BLD: 5 % (ref 4–6)
HCT VFR BLD AUTO: 49.2 % (ref 37–47)
HDLC SERPL-MCNC: 109 MG/DL (ref 65–121)
HGB BLD-MCNC: 15.8 G/DL (ref 12–16)
IMM GRANULOCYTES # BLD: 0.1 K/UL
LDLC SERPL CALC-MCNC: 115 MG/DL
LYMPHOCYTES # BLD: 1.9 K/UL (ref 1.1–4.5)
LYMPHOCYTES NFR BLD: 29.5 % (ref 20–40)
MCH RBC QN AUTO: 29.7 PG (ref 27–31)
MCHC RBC AUTO-ENTMCNC: 32.1 G/DL (ref 33–37)
MCV RBC AUTO: 92.5 FL (ref 81–99)
MONOCYTES # BLD: 0.6 K/UL (ref 0–0.9)
MONOCYTES NFR BLD: 9.8 % (ref 0–10)
NEUTROPHILS # BLD: 3.8 K/UL (ref 1.5–7.5)
NEUTS SEG NFR BLD: 58.1 % (ref 50–65)
PLATELET # BLD AUTO: 283 K/UL (ref 130–400)
PMV BLD AUTO: 9.3 FL (ref 9.4–12.3)
POTASSIUM SERPL-SCNC: 4.7 MMOL/L (ref 3.5–5)
PROGEST SERPL-MCNC: 1.64 NG/ML
PROT SERPL-MCNC: 7.3 G/DL (ref 6.6–8.7)
RBC # BLD AUTO: 5.32 M/UL (ref 4.2–5.4)
SODIUM SERPL-SCNC: 141 MMOL/L (ref 136–145)
T3FREE SERPL-MCNC: 1.9 PG/ML (ref 2–4.4)
T4 FREE SERPL-MCNC: 1.46 NG/DL (ref 0.93–1.7)
TESTOST SERPL-MCNC: <2.5 NG/DL (ref 2.9–40.8)
TRIGL SERPL-MCNC: 77 MG/DL (ref 0–149)
TSH SERPL DL<=0.005 MIU/L-ACNC: 3.36 UIU/ML (ref 0.27–4.2)
WBC # BLD AUTO: 6.5 K/UL (ref 4.8–10.8)

## 2023-12-14 LAB — DHEA-S SERPL-MCNC: 8 UG/DL (ref 19–205)

## 2023-12-17 SDOH — ECONOMIC STABILITY: INCOME INSECURITY: HOW HARD IS IT FOR YOU TO PAY FOR THE VERY BASICS LIKE FOOD, HOUSING, MEDICAL CARE, AND HEATING?: NOT HARD AT ALL

## 2023-12-17 SDOH — ECONOMIC STABILITY: FOOD INSECURITY: WITHIN THE PAST 12 MONTHS, THE FOOD YOU BOUGHT JUST DIDN'T LAST AND YOU DIDN'T HAVE MONEY TO GET MORE.: NEVER TRUE

## 2023-12-17 SDOH — ECONOMIC STABILITY: FOOD INSECURITY: WITHIN THE PAST 12 MONTHS, YOU WORRIED THAT YOUR FOOD WOULD RUN OUT BEFORE YOU GOT MONEY TO BUY MORE.: NEVER TRUE

## 2023-12-17 ASSESSMENT — PATIENT HEALTH QUESTIONNAIRE - PHQ9
SUM OF ALL RESPONSES TO PHQ QUESTIONS 1-9: 0
2. FEELING DOWN, DEPRESSED OR HOPELESS: 0
SUM OF ALL RESPONSES TO PHQ QUESTIONS 1-9: 0
SUM OF ALL RESPONSES TO PHQ9 QUESTIONS 1 & 2: 0
SUM OF ALL RESPONSES TO PHQ QUESTIONS 1-9: 0
SUM OF ALL RESPONSES TO PHQ QUESTIONS 1-9: 0
1. LITTLE INTEREST OR PLEASURE IN DOING THINGS: 0

## 2023-12-18 LAB
ESTRADIOL SERPL HS-MCNC: 5.4 PG/ML
ESTROGEN SERPL CALC-MCNC: 10.3 PG/ML
ESTRONE SERPL-MCNC: 4.9 PG/ML

## 2024-01-26 ENCOUNTER — OFFICE VISIT (OUTPATIENT)
Dept: INTERNAL MEDICINE | Age: 59
End: 2024-01-26
Payer: COMMERCIAL

## 2024-01-26 VITALS
SYSTOLIC BLOOD PRESSURE: 115 MMHG | WEIGHT: 150 LBS | TEMPERATURE: 99.5 F | OXYGEN SATURATION: 98 % | HEART RATE: 68 BPM | BODY MASS INDEX: 23.54 KG/M2 | DIASTOLIC BLOOD PRESSURE: 78 MMHG | HEIGHT: 67 IN

## 2024-01-26 DIAGNOSIS — M25.50 ARTHRALGIA, UNSPECIFIED JOINT: ICD-10-CM

## 2024-01-26 DIAGNOSIS — R30.0 DYSURIA: ICD-10-CM

## 2024-01-26 DIAGNOSIS — R05.9 COUGH, UNSPECIFIED TYPE: ICD-10-CM

## 2024-01-26 DIAGNOSIS — E55.9 VITAMIN D DEFICIENCY: ICD-10-CM

## 2024-01-26 DIAGNOSIS — R09.89 CHEST CONGESTION: Primary | ICD-10-CM

## 2024-01-26 DIAGNOSIS — E03.9 HYPOTHYROIDISM, UNSPECIFIED TYPE: ICD-10-CM

## 2024-01-26 DIAGNOSIS — K21.9 GASTROESOPHAGEAL REFLUX DISEASE WITHOUT ESOPHAGITIS: ICD-10-CM

## 2024-01-26 LAB
BILIRUB UR QL STRIP: NEGATIVE
CLARITY UR: CLEAR
COLOR UR: YELLOW
GLUCOSE UR STRIP.AUTO-MCNC: NEGATIVE MG/DL
HGB UR STRIP.AUTO-MCNC: NEGATIVE MG/L
INFLUENZA A ANTIGEN, POC: NEGATIVE
INFLUENZA B ANTIGEN, POC: NEGATIVE
KETONES UR STRIP.AUTO-MCNC: NEGATIVE MG/DL
LEUKOCYTE ESTERASE UR QL STRIP.AUTO: NEGATIVE
LOT EXPIRE DATE: NORMAL
LOT KIT NUMBER: NORMAL
NITRITE UR QL STRIP.AUTO: NEGATIVE
PH UR STRIP.AUTO: 5.5 [PH] (ref 5–8)
PROT UR STRIP.AUTO-MCNC: NEGATIVE MG/DL
SARS-COV-2, POC: NORMAL
SP GR UR STRIP.AUTO: 1.02 (ref 1–1.03)
UROBILINOGEN UR STRIP.AUTO-MCNC: 0.2 E.U./DL
VALID INTERNAL CONTROL: YES
VENDOR AND KIT NAME POC: NORMAL

## 2024-01-26 PROCEDURE — 99214 OFFICE O/P EST MOD 30 MIN: CPT | Performed by: INTERNAL MEDICINE

## 2024-01-26 RX ORDER — CEFDINIR 300 MG/1
300 CAPSULE ORAL 2 TIMES DAILY
Qty: 20 CAPSULE | Refills: 0 | Status: SHIPPED | OUTPATIENT
Start: 2024-01-26 | End: 2024-02-05

## 2024-01-26 RX ORDER — METHYLPREDNISOLONE 4 MG/1
TABLET ORAL
Qty: 1 KIT | Refills: 0 | Status: SHIPPED | OUTPATIENT
Start: 2024-01-26 | End: 2024-02-01

## 2024-01-26 RX ORDER — OSELTAMIVIR PHOSPHATE 75 MG/1
75 CAPSULE ORAL 2 TIMES DAILY
Qty: 10 CAPSULE | Refills: 0 | Status: SHIPPED | OUTPATIENT
Start: 2024-01-26 | End: 2024-01-31

## 2024-01-26 RX ORDER — BENZONATATE 200 MG/1
200 CAPSULE ORAL 3 TIMES DAILY PRN
Qty: 30 CAPSULE | Refills: 0 | Status: SHIPPED | OUTPATIENT
Start: 2024-01-26

## 2024-01-26 ASSESSMENT — PATIENT HEALTH QUESTIONNAIRE - PHQ9
SUM OF ALL RESPONSES TO PHQ QUESTIONS 1-9: 0
SUM OF ALL RESPONSES TO PHQ QUESTIONS 1-9: 0
SUM OF ALL RESPONSES TO PHQ9 QUESTIONS 1 & 2: 0
2. FEELING DOWN, DEPRESSED OR HOPELESS: 0
SUM OF ALL RESPONSES TO PHQ QUESTIONS 1-9: 0
1. LITTLE INTEREST OR PLEASURE IN DOING THINGS: 0
SUM OF ALL RESPONSES TO PHQ QUESTIONS 1-9: 0

## 2024-01-26 NOTE — PROGRESS NOTES
Chief Complaint   Patient presents with    Fever    Cough    Congestion     Yesterday started        HPI: Nicki Rubio is a 58 y.o. female is here for evaluation of a 2-day history of fatigue, body aches and chills.  Her temperature has been up to 100.7.  She has been coughing.  She states that she has been coughing up some green stuff.  A home COVID test was negative.  A flu swab and COVID swab are negative here in the office today.  She also complains of burning with urination.  She denies any complaints of ear pain or sore throat.    In general, her arthralgias have been relatively stable.  She does take meloxicam as needed.  Her thyroid function has been stable.  Her acid reflux is well-controlled.  She does have a history of vitamin D deficiency and is on vitamin D supplementation.    Past Medical History:   Diagnosis Date    Acid reflux     Anemia     BRCA negative     Chronic back pain     GERD (gastroesophageal reflux disease)     Hypothyroidism     Irritable bowel syndrome     Premature atrial contraction     Skipped heart beats     Thyroid disease     Thyroid mass     Vitamin D deficiency       Past Surgical History:   Procedure Laterality Date    BREAST BIOPSY Right 2012    benign    BREAST ENHANCEMENT SURGERY Bilateral 1999    saline,retro-pectoral    BREAST SURGERY      Augmentation,Biopsy+    CHOLECYSTECTOMY      COLONOSCOPY  05/25/2005    Dr Moctezuma-BCM, no active colitis    COLONOSCOPY  12/12/2012    Dr Keyes-Redundant colon    COLONOSCOPY N/A 04/22/2021    Dr MUSA Escamilla-HP, 5 yr recall    HEMORRHOID SURGERY      THYROID SURGERY      Biopsy    THYROID SURGERY      nodule removed    TONSILLECTOMY      TUBAL LIGATION      UPPER GASTROINTESTINAL ENDOSCOPY  05/25/2005    Dr Moctezuma-No celiac, gastritis    UPPER GASTROINTESTINAL ENDOSCOPY  02/12/2015    Dr Keyes-Kim neg, mild antral gastritis    UPPER GASTROINTESTINAL ENDOSCOPY N/A 08/05/2019    Dr Duque-normal exam, NEG EoE    UPPER

## 2024-02-02 SDOH — ECONOMIC STABILITY: INCOME INSECURITY: HOW HARD IS IT FOR YOU TO PAY FOR THE VERY BASICS LIKE FOOD, HOUSING, MEDICAL CARE, AND HEATING?: NOT HARD AT ALL

## 2024-02-02 SDOH — ECONOMIC STABILITY: FOOD INSECURITY: WITHIN THE PAST 12 MONTHS, YOU WORRIED THAT YOUR FOOD WOULD RUN OUT BEFORE YOU GOT MONEY TO BUY MORE.: NEVER TRUE

## 2024-02-02 SDOH — ECONOMIC STABILITY: FOOD INSECURITY: WITHIN THE PAST 12 MONTHS, THE FOOD YOU BOUGHT JUST DIDN'T LAST AND YOU DIDN'T HAVE MONEY TO GET MORE.: NEVER TRUE

## 2024-02-02 ASSESSMENT — ENCOUNTER SYMPTOMS
DIARRHEA: 0
EYE DISCHARGE: 0
SHORTNESS OF BREATH: 0
RECTAL PAIN: 0
NAUSEA: 0
EYE PAIN: 0
WHEEZING: 0
PHOTOPHOBIA: 0
VOMITING: 0
CHEST TIGHTNESS: 0
SINUS PRESSURE: 1
SORE THROAT: 0
SINUS PAIN: 1
EYE REDNESS: 0
EYE ITCHING: 0
CHOKING: 0
ABDOMINAL DISTENTION: 0
TROUBLE SWALLOWING: 0
BLOOD IN STOOL: 0
ABDOMINAL PAIN: 0
CONSTIPATION: 0
BACK PAIN: 0
COUGH: 1
ANAL BLEEDING: 0
VOICE CHANGE: 0
FACIAL SWELLING: 0
RHINORRHEA: 1
COLOR CHANGE: 0

## 2024-03-11 ENCOUNTER — OFFICE VISIT (OUTPATIENT)
Dept: ENT CLINIC | Age: 59
End: 2024-03-11
Payer: COMMERCIAL

## 2024-03-11 VITALS
HEIGHT: 67 IN | BODY MASS INDEX: 23.39 KG/M2 | DIASTOLIC BLOOD PRESSURE: 76 MMHG | SYSTOLIC BLOOD PRESSURE: 114 MMHG | WEIGHT: 149 LBS

## 2024-03-11 DIAGNOSIS — H92.01 OTALGIA, RIGHT: Primary | ICD-10-CM

## 2024-03-11 PROCEDURE — 99213 OFFICE O/P EST LOW 20 MIN: CPT | Performed by: OTOLARYNGOLOGY

## 2024-03-11 ASSESSMENT — ENCOUNTER SYMPTOMS
EYES NEGATIVE: 1
GASTROINTESTINAL NEGATIVE: 1
ALLERGIC/IMMUNOLOGIC NEGATIVE: 1
RESPIRATORY NEGATIVE: 1

## 2024-03-11 NOTE — PROGRESS NOTES
Musculoskeletal:      Cervical back: Normal range of motion.   Skin:     General: Skin is warm and dry.   Neurological:      General: No focal deficit present.      Mental Status: She is alert and oriented to person, place, and time.   Psychiatric:         Mood and Affect: Mood normal.         Behavior: Behavior normal.              ASSESSMENT/PLAN:    1. Otalgia, right  Ear looks normal today.  This may be mild eustachian tube dysfunction.  She started using Nasacort and she started using it every day and I think this might help.  She can follow-up as needed.    Return if symptoms worsen or fail to improve.    An electronic signature was used to authenticate this note.    Andrei Null MD       Please note that this chart was generated using dragon dictation software.  Although every effort was made to ensure the accuracy of this automated transcription, some errors in transcription may have occurred.

## 2024-04-17 DIAGNOSIS — E34.9 HORMONAL DISORDER: Primary | ICD-10-CM

## 2024-04-17 NOTE — TELEPHONE ENCOUNTER
Nicki MARTÍNEZ Joanne called to request a refill on her medication.      Last office visit : 1/26/2024   Next office visit : 5/7/2024     Last UDS: No results found for: \"LABAMPH\", \"LABBARB\", \"LABBENZ\", \"BUPRENUR\", \"COCAIMETSCRU\", \"GABAPENTIN\", \"MDMA\", \"METAMPU\", \"OPIATESCREENURINE\", \"OXTCOSU\", \"PHENCYCLIDINESCREENURINE\", \"PROPOXYPHENE\", \"THCSCREENUR\", \"TRICYUR\"    Last Jimi: na   Medication Contract: na     Requested Prescriptions     Pending Prescriptions Disp Refills    Testosterone Cypionate POWD [Pharmacy Med Name: 8:2B1/T0.4/D4/O60U/GM] 30 g 0     Sig: INSERT 1 GRAM VAGINALLY AT BEDTIME         Please approve or refuse this medication.   Peggy Norris LPN

## 2024-04-18 RX ORDER — TESTOSTERONE CYPIONATE, MICRO 100 %
POWDER (GRAM) MISCELLANEOUS
Qty: 30 G | Refills: 2 | Status: SHIPPED | OUTPATIENT
Start: 2024-04-18 | End: 2024-05-21

## 2024-05-01 DIAGNOSIS — E34.9 HORMONE DISORDER: ICD-10-CM

## 2024-05-01 DIAGNOSIS — E55.9 VITAMIN D DEFICIENCY: ICD-10-CM

## 2024-05-01 DIAGNOSIS — E03.9 HYPOTHYROIDISM, UNSPECIFIED TYPE: ICD-10-CM

## 2024-05-01 LAB
25(OH)D3 SERPL-MCNC: 38.1 NG/ML
ALBUMIN SERPL-MCNC: 4.2 G/DL (ref 3.5–5.2)
ALP SERPL-CCNC: 74 U/L (ref 35–104)
ALT SERPL-CCNC: 12 U/L (ref 5–33)
ANION GAP SERPL CALCULATED.3IONS-SCNC: 11 MMOL/L (ref 7–19)
AST SERPL-CCNC: 17 U/L (ref 5–32)
BASOPHILS # BLD: 0 K/UL (ref 0–0.2)
BASOPHILS NFR BLD: 0.5 % (ref 0–1)
BILIRUB SERPL-MCNC: 0.5 MG/DL (ref 0.2–1.2)
BUN SERPL-MCNC: 10 MG/DL (ref 6–20)
CALCIUM SERPL-MCNC: 9.3 MG/DL (ref 8.6–10)
CHLORIDE SERPL-SCNC: 103 MMOL/L (ref 98–111)
CHOLEST SERPL-MCNC: 210 MG/DL (ref 160–199)
CO2 SERPL-SCNC: 26 MMOL/L (ref 22–29)
CORTIS AM PEAK SERPL-MCNC: 16 UG/DL (ref 6.2–19.4)
CREAT SERPL-MCNC: 0.7 MG/DL (ref 0.5–0.9)
EOSINOPHIL # BLD: 0.1 K/UL (ref 0–0.6)
EOSINOPHIL NFR BLD: 2.1 % (ref 0–5)
ERYTHROCYTE [DISTWIDTH] IN BLOOD BY AUTOMATED COUNT: 13.6 % (ref 11.5–14.5)
GLUCOSE SERPL-MCNC: 82 MG/DL (ref 74–109)
HBA1C MFR BLD: 5.1 % (ref 4–6)
HCT VFR BLD AUTO: 47.4 % (ref 37–47)
HDLC SERPL-MCNC: 90 MG/DL (ref 65–121)
HGB BLD-MCNC: 15.1 G/DL (ref 12–16)
IMM GRANULOCYTES # BLD: 0 K/UL
LDLC SERPL CALC-MCNC: 105 MG/DL
LYMPHOCYTES # BLD: 1.3 K/UL (ref 1.1–4.5)
LYMPHOCYTES NFR BLD: 30.5 % (ref 20–40)
MCH RBC QN AUTO: 28.1 PG (ref 27–31)
MCHC RBC AUTO-ENTMCNC: 31.9 G/DL (ref 33–37)
MCV RBC AUTO: 88.1 FL (ref 81–99)
MONOCYTES # BLD: 0.4 K/UL (ref 0–0.9)
MONOCYTES NFR BLD: 8.1 % (ref 0–10)
NEUTROPHILS # BLD: 2.5 K/UL (ref 1.5–7.5)
NEUTS SEG NFR BLD: 58.6 % (ref 50–65)
PLATELET # BLD AUTO: 238 K/UL (ref 130–400)
PMV BLD AUTO: 10.3 FL (ref 9.4–12.3)
POTASSIUM SERPL-SCNC: 4.3 MMOL/L (ref 3.5–5)
PROGEST SERPL-MCNC: 0.32 NG/ML
PROT SERPL-MCNC: 6.9 G/DL (ref 6.6–8.7)
RBC # BLD AUTO: 5.38 M/UL (ref 4.2–5.4)
SODIUM SERPL-SCNC: 140 MMOL/L (ref 136–145)
T3FREE SERPL-MCNC: 2.2 PG/ML (ref 2–4.4)
T4 FREE SERPL-MCNC: 1.38 NG/DL (ref 0.93–1.7)
TESTOST SERPL-MCNC: 10.7 NG/DL (ref 2.9–40.8)
TRIGL SERPL-MCNC: 77 MG/DL (ref 0–149)
TSH SERPL DL<=0.005 MIU/L-ACNC: 1.82 UIU/ML (ref 0.27–4.2)
WBC # BLD AUTO: 4.3 K/UL (ref 4.8–10.8)

## 2024-05-02 LAB — DHEA-S SERPL-MCNC: 30 UG/DL (ref 19–205)

## 2024-05-04 LAB
ESTRADIOL SERPL HS-MCNC: 5.1 PG/ML
ESTROGEN SERPL CALC-MCNC: 22.8 PG/ML
ESTRONE SERPL-MCNC: 17.7 PG/ML

## 2024-05-07 ENCOUNTER — HOSPITAL ENCOUNTER (OUTPATIENT)
Dept: GENERAL RADIOLOGY | Age: 59
Discharge: HOME | End: 2024-05-07
Payer: COMMERCIAL

## 2024-05-07 ENCOUNTER — OFFICE VISIT (OUTPATIENT)
Dept: INTERNAL MEDICINE | Age: 59
End: 2024-05-07
Payer: COMMERCIAL

## 2024-05-07 VITALS
HEART RATE: 72 BPM | DIASTOLIC BLOOD PRESSURE: 70 MMHG | SYSTOLIC BLOOD PRESSURE: 116 MMHG | HEIGHT: 67 IN | OXYGEN SATURATION: 99 % | WEIGHT: 146 LBS | BODY MASS INDEX: 22.91 KG/M2

## 2024-05-07 DIAGNOSIS — E03.9 HYPOTHYROIDISM, UNSPECIFIED TYPE: ICD-10-CM

## 2024-05-07 DIAGNOSIS — Z91.09 ENVIRONMENTAL ALLERGIES: ICD-10-CM

## 2024-05-07 DIAGNOSIS — M54.9 BACK PAIN, UNSPECIFIED BACK LOCATION, UNSPECIFIED BACK PAIN LATERALITY, UNSPECIFIED CHRONICITY: Primary | ICD-10-CM

## 2024-05-07 DIAGNOSIS — F41.9 ANXIETY AND DEPRESSION: ICD-10-CM

## 2024-05-07 DIAGNOSIS — K21.9 GASTROESOPHAGEAL REFLUX DISEASE WITHOUT ESOPHAGITIS: ICD-10-CM

## 2024-05-07 DIAGNOSIS — E55.9 VITAMIN D DEFICIENCY: ICD-10-CM

## 2024-05-07 DIAGNOSIS — R79.89 LOW TESTOSTERONE LEVEL IN FEMALE: ICD-10-CM

## 2024-05-07 DIAGNOSIS — R10.9 ABDOMINAL CRAMPING: ICD-10-CM

## 2024-05-07 DIAGNOSIS — M54.9 BACK PAIN, UNSPECIFIED BACK LOCATION, UNSPECIFIED BACK PAIN LATERALITY, UNSPECIFIED CHRONICITY: ICD-10-CM

## 2024-05-07 DIAGNOSIS — F32.A ANXIETY AND DEPRESSION: ICD-10-CM

## 2024-05-07 DIAGNOSIS — E34.9 HORMONAL DISORDER: ICD-10-CM

## 2024-05-07 PROCEDURE — 99214 OFFICE O/P EST MOD 30 MIN: CPT | Performed by: INTERNAL MEDICINE

## 2024-05-07 PROCEDURE — 72070 X-RAY EXAM THORAC SPINE 2VWS: CPT

## 2024-05-07 PROCEDURE — 72110 X-RAY EXAM L-2 SPINE 4/>VWS: CPT

## 2024-05-07 RX ORDER — BACLOFEN 10 MG/1
10 TABLET ORAL 3 TIMES DAILY
Qty: 30 TABLET | Refills: 0 | Status: SHIPPED | OUTPATIENT
Start: 2024-05-07

## 2024-05-07 RX ORDER — ERGOCALCIFEROL 1.25 MG/1
CAPSULE ORAL
Qty: 12 CAPSULE | Refills: 3 | Status: SHIPPED | OUTPATIENT
Start: 2024-05-07

## 2024-05-07 RX ORDER — DULOXETIN HYDROCHLORIDE 30 MG/1
30 CAPSULE, DELAYED RELEASE ORAL DAILY
Qty: 30 CAPSULE | Refills: 5 | Status: SHIPPED | OUTPATIENT
Start: 2024-05-07

## 2024-05-07 RX ORDER — LEVOTHYROXINE SODIUM 112 UG/1
112 TABLET ORAL DAILY
Qty: 90 TABLET | Refills: 1 | Status: SHIPPED | OUTPATIENT
Start: 2024-05-07

## 2024-05-07 RX ORDER — SUCRALFATE ORAL 1 G/10ML
1 SUSPENSION ORAL 4 TIMES DAILY
Qty: 1200 ML | Refills: 3 | Status: SHIPPED | OUTPATIENT
Start: 2024-05-07

## 2024-05-07 RX ORDER — HYOSCYAMINE SULFATE 0.12 MG/1
125 TABLET SUBLINGUAL 3 TIMES DAILY PRN
Qty: 90 EACH | Refills: 1 | Status: SHIPPED | OUTPATIENT
Start: 2024-05-07

## 2024-05-07 RX ORDER — ESTRADIOL 0.05 MG/D
1 PATCH TRANSDERMAL WEEKLY
Qty: 4 PATCH | Refills: 3 | Status: SHIPPED | OUTPATIENT
Start: 2024-05-07

## 2024-05-07 RX ORDER — PANTOPRAZOLE SODIUM 40 MG/1
TABLET, DELAYED RELEASE ORAL
Qty: 90 TABLET | Refills: 1 | Status: SHIPPED | OUTPATIENT
Start: 2024-05-07

## 2024-05-07 RX ORDER — PROGESTERONE 100 MG/1
100 CAPSULE ORAL DAILY
Qty: 30 CAPSULE | Refills: 2 | Status: SHIPPED | OUTPATIENT
Start: 2024-05-07

## 2024-05-07 RX ORDER — BACLOFEN 10 MG/1
10 TABLET ORAL 3 TIMES DAILY
Qty: 30 TABLET | Refills: 1 | Status: SHIPPED | OUTPATIENT
Start: 2024-05-07

## 2024-05-07 NOTE — PROGRESS NOTES
Hyoscyamine Sulfate SL (LEVSIN/SL) 0.125 MG SUBL; Place 1 tablet under the tongue 3 times daily as needed (abdominal  cramping)  -     baclofen (LIORESAL) 10 MG tablet; Take 1 tablet by mouth 3 times daily          Return in about 7 months (around 12/7/2024) for physical.     Orders Placed This Encounter   Procedures    XR LUMBAR SPINE (MIN 4 VIEWS)     Standing Status:   Future     Number of Occurrences:   1     Standing Expiration Date:   5/7/2025    XR THORACIC SPINE (2 VIEWS)     Standing Status:   Future     Number of Occurrences:   1     Standing Expiration Date:   5/7/2025    CBC with Auto Differential     Standing Status:   Future     Standing Expiration Date:   5/7/2025    Comprehensive Metabolic Panel     Standing Status:   Future     Standing Expiration Date:   5/7/2025    Hemoglobin A1C     Standing Status:   Future     Standing Expiration Date:   5/7/2025    Lipid Panel     Standing Status:   Future     Standing Expiration Date:   5/7/2025    TSH     Standing Status:   Future     Standing Expiration Date:   5/7/2025    Vitamin D 25 Hydroxy     Standing Status:   Future     Standing Expiration Date:   5/7/2025    Cortisol AM, Total     Standing Status:   Future     Standing Expiration Date:   5/7/2025    T3, Free     Standing Status:   Future     Standing Expiration Date:   5/7/2025    T4, Free     Standing Status:   Future     Standing Expiration Date:   5/7/2025    DHEA-Sulfate     Standing Status:   Future     Standing Expiration Date:   5/7/2025    Progesterone     Standing Status:   Future     Standing Expiration Date:   5/7/2025    Testosterone     Standing Status:   Future     Standing Expiration Date:   5/7/2025    Estradiol     Standing Status:   Future     Standing Expiration Date:   5/7/2025    Estrogens, Fractionated     Standing Status:   Future     Standing Expiration Date:   5/7/2025    Progesterone     Standing Status:   Future     Standing Expiration Date:   5/7/2025       Lauren L

## 2024-05-09 SDOH — ECONOMIC STABILITY: FOOD INSECURITY: WITHIN THE PAST 12 MONTHS, YOU WORRIED THAT YOUR FOOD WOULD RUN OUT BEFORE YOU GOT MONEY TO BUY MORE.: NEVER TRUE

## 2024-05-09 SDOH — ECONOMIC STABILITY: FOOD INSECURITY: WITHIN THE PAST 12 MONTHS, THE FOOD YOU BOUGHT JUST DIDN'T LAST AND YOU DIDN'T HAVE MONEY TO GET MORE.: NEVER TRUE

## 2024-05-09 SDOH — ECONOMIC STABILITY: INCOME INSECURITY: HOW HARD IS IT FOR YOU TO PAY FOR THE VERY BASICS LIKE FOOD, HOUSING, MEDICAL CARE, AND HEATING?: NOT HARD AT ALL

## 2024-05-09 ASSESSMENT — ENCOUNTER SYMPTOMS
ANAL BLEEDING: 0
BLOOD IN STOOL: 0
EYE ITCHING: 0
SINUS PRESSURE: 0
EYE REDNESS: 0
CHOKING: 0
SORE THROAT: 0
EYE PAIN: 0
COUGH: 0
RHINORRHEA: 0
ABDOMINAL DISTENTION: 0
DIARRHEA: 0
CONSTIPATION: 0
VOMITING: 0
BACK PAIN: 1
RECTAL PAIN: 0
COLOR CHANGE: 0
SINUS PAIN: 0
NAUSEA: 0
CHEST TIGHTNESS: 0
FACIAL SWELLING: 0
WHEEZING: 0
EYE DISCHARGE: 0
ABDOMINAL PAIN: 0
PHOTOPHOBIA: 0
TROUBLE SWALLOWING: 0
SHORTNESS OF BREATH: 0
VOICE CHANGE: 0

## 2024-05-09 ASSESSMENT — PATIENT HEALTH QUESTIONNAIRE - PHQ9
2. FEELING DOWN, DEPRESSED OR HOPELESS: NOT AT ALL
SUM OF ALL RESPONSES TO PHQ9 QUESTIONS 1 & 2: 0
SUM OF ALL RESPONSES TO PHQ QUESTIONS 1-9: 0
1. LITTLE INTEREST OR PLEASURE IN DOING THINGS: NOT AT ALL
SUM OF ALL RESPONSES TO PHQ QUESTIONS 1-9: 0

## 2024-05-28 DIAGNOSIS — M25.50 ARTHRALGIA, UNSPECIFIED JOINT: ICD-10-CM

## 2024-05-28 DIAGNOSIS — M54.9 BACK PAIN, UNSPECIFIED BACK LOCATION, UNSPECIFIED BACK PAIN LATERALITY, UNSPECIFIED CHRONICITY: Primary | ICD-10-CM

## 2024-06-03 ENCOUNTER — HOSPITAL ENCOUNTER (OUTPATIENT)
Dept: MRI IMAGING | Age: 59
Discharge: HOME | End: 2024-06-03
Attending: INTERNAL MEDICINE
Payer: COMMERCIAL

## 2024-06-03 DIAGNOSIS — M48.061 NEUROFORAMINAL STENOSIS OF LUMBAR SPINE: Primary | ICD-10-CM

## 2024-06-03 DIAGNOSIS — M25.50 ARTHRALGIA, UNSPECIFIED JOINT: ICD-10-CM

## 2024-06-03 DIAGNOSIS — M54.9 BACK PAIN, UNSPECIFIED BACK LOCATION, UNSPECIFIED BACK PAIN LATERALITY, UNSPECIFIED CHRONICITY: ICD-10-CM

## 2024-06-03 PROCEDURE — 72146 MRI CHEST SPINE W/O DYE: CPT

## 2024-06-03 PROCEDURE — 72148 MRI LUMBAR SPINE W/O DYE: CPT

## 2024-06-05 ENCOUNTER — TELEPHONE (OUTPATIENT)
Dept: NEUROSURGERY | Age: 59
End: 2024-06-05

## 2024-06-05 NOTE — TELEPHONE ENCOUNTER
1st attempt to contact patient in regards to new patient referral. No answer, left VM stating to call the office back at [phone].    -DX:   M48.061 (ICD-10-CM) - Neuroforaminal stenosis of lumbar spine   M54.9 (ICD-10-CM) - Back pain, unspecified back location, unspecified back pain laterality, unspecified chronicity   -MRI L/T SPINE (6/3/2024)

## 2024-06-05 NOTE — TELEPHONE ENCOUNTER
[facility] New Patient Questionnaire    Diagnosis/Reason for Referral?     DX:   M48.061 (ICD-10-CM) - Neuroforaminal stenosis of lumbar spine   M54.9 (ICD-10-CM) - Back pain, unspecified back location, unspecified back pain laterality, unspecified chronicity       2. Who is completing questionnaire?      Patient X Caregiver Family      3. Has the patient had any previous spinal/brain surgeries?     NO      A. If yes, what is the name of the facility in which the surgery was performed?       B. Procedure/Surgery performed?       C. Who was the surgeon?       D. When was the surgery?    MM/YY       E. Did the patient improve after the surgery?        4. Is this a second opinion?   If yes, Dr. Mcgrath would like to review patient first before making the appointment.      5. Have MRI Images been obtain within the last year?     Yes X  No      XR  CT     If yes, where was the imaging performed?     [facility]    If yes, what part of the body?     Lumbar X Cervical  Thoracic X Brain     If yes, when was it obtained?      MM/YY    Note: if the scan was performed at a facility other than [facility], the disc will need to be brought to the appointment or we need to reach out to obtain the disc.     A. Was the patient instructed to provide the disc?      Yes   No X      8. Has the patient had a NCV/EMG within the last year?      Yes  No X     If yes, where was it performed and date?      MM/YY  Location:      9. Has the patient been to Physical Therapy?      Yes X  No     If yes, what location, how long attended, and last visit?    [facility]       Therapy Lasted:    Date of Last Visit:      10. Has the patient been to Pain Management?     Yes  No X     If yes, what location and last visit     Location:   Last Visit:   Is it helping?

## 2024-06-07 ENCOUNTER — OFFICE VISIT (OUTPATIENT)
Dept: INTERNAL MEDICINE | Age: 59
End: 2024-06-07
Payer: COMMERCIAL

## 2024-06-07 VITALS
HEIGHT: 67 IN | BODY MASS INDEX: 22.41 KG/M2 | OXYGEN SATURATION: 99 % | HEART RATE: 72 BPM | SYSTOLIC BLOOD PRESSURE: 116 MMHG | DIASTOLIC BLOOD PRESSURE: 71 MMHG | WEIGHT: 142.8 LBS

## 2024-06-07 DIAGNOSIS — R10.9 ABDOMINAL PAIN, UNSPECIFIED ABDOMINAL LOCATION: Primary | ICD-10-CM

## 2024-06-07 DIAGNOSIS — F41.9 ANXIETY AND DEPRESSION: Primary | ICD-10-CM

## 2024-06-07 DIAGNOSIS — M54.50 CHRONIC LOW BACK PAIN, UNSPECIFIED BACK PAIN LATERALITY, UNSPECIFIED WHETHER SCIATICA PRESENT: ICD-10-CM

## 2024-06-07 DIAGNOSIS — E34.9 HORMONAL DISORDER: ICD-10-CM

## 2024-06-07 DIAGNOSIS — F32.A ANXIETY AND DEPRESSION: Primary | ICD-10-CM

## 2024-06-07 DIAGNOSIS — G89.29 CHRONIC LOW BACK PAIN, UNSPECIFIED BACK PAIN LATERALITY, UNSPECIFIED WHETHER SCIATICA PRESENT: ICD-10-CM

## 2024-06-07 DIAGNOSIS — K30 INDIGESTION: ICD-10-CM

## 2024-06-07 DIAGNOSIS — F41.9 ANXIETY DISORDER, UNSPECIFIED TYPE: ICD-10-CM

## 2024-06-07 LAB
ALBUMIN SERPL-MCNC: 4.6 G/DL (ref 3.5–5.2)
ALP SERPL-CCNC: 83 U/L (ref 35–104)
ALT SERPL-CCNC: 13 U/L (ref 5–33)
ANION GAP SERPL CALCULATED.3IONS-SCNC: 9 MMOL/L (ref 7–19)
AST SERPL-CCNC: 18 U/L (ref 5–32)
BASOPHILS # BLD: 0 K/UL (ref 0–0.2)
BASOPHILS NFR BLD: 0.4 % (ref 0–1)
BILIRUB SERPL-MCNC: 0.5 MG/DL (ref 0.2–1.2)
BUN SERPL-MCNC: 12 MG/DL (ref 6–20)
CALCIUM SERPL-MCNC: 9.5 MG/DL (ref 8.6–10)
CHLORIDE SERPL-SCNC: 104 MMOL/L (ref 98–111)
CO2 SERPL-SCNC: 27 MMOL/L (ref 22–29)
CREAT SERPL-MCNC: 0.6 MG/DL (ref 0.5–0.9)
EOSINOPHIL # BLD: 0.1 K/UL (ref 0–0.6)
EOSINOPHIL NFR BLD: 1.1 % (ref 0–5)
ERYTHROCYTE [DISTWIDTH] IN BLOOD BY AUTOMATED COUNT: 13.2 % (ref 11.5–14.5)
GLUCOSE SERPL-MCNC: 88 MG/DL (ref 74–109)
HCT VFR BLD AUTO: 46.1 % (ref 37–47)
HGB BLD-MCNC: 14.9 G/DL (ref 12–16)
IMM GRANULOCYTES # BLD: 0 K/UL
LIPASE SERPL-CCNC: 37 U/L (ref 13–60)
LYMPHOCYTES # BLD: 1.6 K/UL (ref 1.1–4.5)
LYMPHOCYTES NFR BLD: 30.8 % (ref 20–40)
MCH RBC QN AUTO: 28.9 PG (ref 27–31)
MCHC RBC AUTO-ENTMCNC: 32.3 G/DL (ref 33–37)
MCV RBC AUTO: 89.3 FL (ref 81–99)
MONOCYTES # BLD: 0.5 K/UL (ref 0–0.9)
MONOCYTES NFR BLD: 9.4 % (ref 0–10)
NEUTROPHILS # BLD: 3 K/UL (ref 1.5–7.5)
NEUTS SEG NFR BLD: 58.1 % (ref 50–65)
PLATELET # BLD AUTO: 230 K/UL (ref 130–400)
PMV BLD AUTO: 10.1 FL (ref 9.4–12.3)
POTASSIUM SERPL-SCNC: 4.1 MMOL/L (ref 3.5–5)
PROGEST SERPL-MCNC: 0.1 NG/ML
PROT SERPL-MCNC: 7.4 G/DL (ref 6.6–8.7)
RBC # BLD AUTO: 5.16 M/UL (ref 4.2–5.4)
SODIUM SERPL-SCNC: 140 MMOL/L (ref 136–145)
WBC # BLD AUTO: 5.2 K/UL (ref 4.8–10.8)

## 2024-06-07 PROCEDURE — 99214 OFFICE O/P EST MOD 30 MIN: CPT | Performed by: INTERNAL MEDICINE

## 2024-06-07 RX ORDER — PROGESTERONE 100 MG/1
100 CAPSULE ORAL DAILY
Qty: 30 CAPSULE | Refills: 2 | Status: SHIPPED | OUTPATIENT
Start: 2024-06-07

## 2024-06-07 RX ORDER — BUSPIRONE HYDROCHLORIDE 5 MG/1
5 TABLET ORAL 3 TIMES DAILY
Qty: 90 TABLET | Refills: 1 | Status: SHIPPED | OUTPATIENT
Start: 2024-06-07

## 2024-06-07 NOTE — PROGRESS NOTES
normal.         Judgment: Judgment normal.         1. Abdominal pain, unspecified abdominal location    2. Indigestion    3. Chronic low back pain, unspecified back pain laterality, unspecified whether sciatica present    4. Anxiety disorder, unspecified type        ASSESSMENT/PLAN:    58-year-old woman here for follow-up    1.  Abdominal pain: Patient states that her abdominal pain does get worse after drinking alcohol.  We will test her for H. pylori, celiac disease and check a lipase.  I will also check a CBC and CMP.  Continue Carafate and Protonix.  Patient does not wish to go back to GI at this time    2.  Back pain: Patient did have an abnormal MRI.  Referral to neurology is currently pending.    3.  Anxiety: Patient did not take the Cymbalta.  At this time, she feels like her anxiety is stable    Nicki was seen today for follow-up.    Diagnoses and all orders for this visit:    Abdominal pain, unspecified abdominal location    Indigestion  -     H. Pylori Breath Test; Future  -     H. Pylori Antigen, Stool; Future  -     Celiac Reflex Panel; Future  -     Cancel: MISCELLANEOUS SENDOUT food allergy profile; Future  -     Lipase; Future  -     CBC with Auto Differential; Future  -     Comprehensive Metabolic Panel; Future    Chronic low back pain, unspecified back pain laterality, unspecified whether sciatica present    Anxiety disorder, unspecified type          No follow-ups on file.     Orders Placed This Encounter   Procedures    H. Pylori Breath Test     Standing Status:   Future     Standing Expiration Date:   6/7/2025    H. Pylori Antigen, Stool     Standing Status:   Future     Standing Expiration Date:   6/7/2025    Celiac Reflex Panel     Standing Status:   Future     Number of Occurrences:   1     Standing Expiration Date:   6/7/2025    Lipase     Standing Status:   Future     Number of Occurrences:   1     Standing Expiration Date:   6/7/2025    CBC with Auto Differential     Standing Status:

## 2024-06-10 LAB — TTG IGA SER IA-ACNC: <1.02 FLU (ref 0–4.99)

## 2024-06-10 SDOH — ECONOMIC STABILITY: FOOD INSECURITY: WITHIN THE PAST 12 MONTHS, THE FOOD YOU BOUGHT JUST DIDN'T LAST AND YOU DIDN'T HAVE MONEY TO GET MORE.: NEVER TRUE

## 2024-06-10 SDOH — ECONOMIC STABILITY: FOOD INSECURITY: WITHIN THE PAST 12 MONTHS, YOU WORRIED THAT YOUR FOOD WOULD RUN OUT BEFORE YOU GOT MONEY TO BUY MORE.: NEVER TRUE

## 2024-06-10 SDOH — ECONOMIC STABILITY: INCOME INSECURITY: HOW HARD IS IT FOR YOU TO PAY FOR THE VERY BASICS LIKE FOOD, HOUSING, MEDICAL CARE, AND HEATING?: NOT HARD AT ALL

## 2024-06-10 ASSESSMENT — PATIENT HEALTH QUESTIONNAIRE - PHQ9
5. POOR APPETITE OR OVEREATING: NOT AT ALL
SUM OF ALL RESPONSES TO PHQ QUESTIONS 1-9: 0
7. TROUBLE CONCENTRATING ON THINGS, SUCH AS READING THE NEWSPAPER OR WATCHING TELEVISION: NOT AT ALL
10. IF YOU CHECKED OFF ANY PROBLEMS, HOW DIFFICULT HAVE THESE PROBLEMS MADE IT FOR YOU TO DO YOUR WORK, TAKE CARE OF THINGS AT HOME, OR GET ALONG WITH OTHER PEOPLE: NOT DIFFICULT AT ALL
SUM OF ALL RESPONSES TO PHQ QUESTIONS 1-9: 0
SUM OF ALL RESPONSES TO PHQ QUESTIONS 1-9: 0
1. LITTLE INTEREST OR PLEASURE IN DOING THINGS: NOT AT ALL
4. FEELING TIRED OR HAVING LITTLE ENERGY: NOT AT ALL
SUM OF ALL RESPONSES TO PHQ QUESTIONS 1-9: 0
9. THOUGHTS THAT YOU WOULD BE BETTER OFF DEAD, OR OF HURTING YOURSELF: NOT AT ALL
6. FEELING BAD ABOUT YOURSELF - OR THAT YOU ARE A FAILURE OR HAVE LET YOURSELF OR YOUR FAMILY DOWN: NOT AT ALL
3. TROUBLE FALLING OR STAYING ASLEEP: NOT AT ALL
8. MOVING OR SPEAKING SO SLOWLY THAT OTHER PEOPLE COULD HAVE NOTICED. OR THE OPPOSITE, BEING SO FIGETY OR RESTLESS THAT YOU HAVE BEEN MOVING AROUND A LOT MORE THAN USUAL: NOT AT ALL
2. FEELING DOWN, DEPRESSED OR HOPELESS: NOT AT ALL
SUM OF ALL RESPONSES TO PHQ9 QUESTIONS 1 & 2: 0

## 2024-06-10 ASSESSMENT — ENCOUNTER SYMPTOMS
DIARRHEA: 0
SHORTNESS OF BREATH: 0
SINUS PRESSURE: 0
EYE ITCHING: 0
ABDOMINAL PAIN: 1
EYE PAIN: 0
ABDOMINAL DISTENTION: 0
CHOKING: 0
COLOR CHANGE: 0
WHEEZING: 0
TROUBLE SWALLOWING: 0
BLOOD IN STOOL: 0
CONSTIPATION: 0
NAUSEA: 0
FACIAL SWELLING: 0
VOMITING: 0
ANAL BLEEDING: 0
SINUS PAIN: 0
VOICE CHANGE: 0
PHOTOPHOBIA: 0
BACK PAIN: 1
EYE DISCHARGE: 0
COUGH: 0
CHEST TIGHTNESS: 0
EYE REDNESS: 0
SORE THROAT: 0
RECTAL PAIN: 0
RHINORRHEA: 0

## 2024-06-11 DIAGNOSIS — K30 INDIGESTION: ICD-10-CM

## 2024-06-11 LAB
CLAM IGE QN: 0.18 KU/L
CODFISH IGE QN: <0.1 KU/L
CORN IGE QN: 0.2 KU/L
COW MILK IGE QN: <0.1 KU/L
DEPRECATED MISC ALLERGEN IGE RAST QL: NORMAL
EGG WHITE IGE QN: <0.1 KU/L
IGE SERPL-ACNC: 30 KU/L
PEANUT IGE QN: 0.28 KU/L
SCALLOP IGE QN: <0.1 KU/L
SHRIMP IGE QN: <0.1 KU/L
SOYBEAN IGE QN: <0.1 KU/L
WALNUT IGE QN: <0.1 KU/L
WHEAT IGE QN: 0.16 KU/L

## 2024-06-12 LAB — H PYLORI AG STL QL IA: NEGATIVE

## 2024-06-13 LAB
ESTRADIOL SERPL HS-MCNC: 24.9 PG/ML
ESTROGEN SERPL CALC-MCNC: 38.8 PG/ML
ESTRONE SERPL-MCNC: 13.9 PG/ML

## 2024-08-26 DIAGNOSIS — E34.9 HORMONAL DISORDER: Primary | ICD-10-CM

## 2024-08-26 RX ORDER — ESTRADIOL 0.05 MG/D
1 PATCH TRANSDERMAL WEEKLY
Qty: 4 PATCH | Refills: 3 | Status: SHIPPED | OUTPATIENT
Start: 2024-08-26

## 2024-09-03 ENCOUNTER — TELEPHONE (OUTPATIENT)
Dept: INTERNAL MEDICINE | Age: 59
End: 2024-09-03

## 2024-09-03 DIAGNOSIS — U07.1 COVID: Primary | ICD-10-CM

## 2024-09-03 DIAGNOSIS — U07.1 COVID: ICD-10-CM

## 2024-09-03 RX ORDER — METHYLPREDNISOLONE 4 MG
TABLET, DOSE PACK ORAL
Qty: 1 KIT | Refills: 0 | Status: SHIPPED | OUTPATIENT
Start: 2024-09-03 | End: 2024-09-09

## 2024-09-03 RX ORDER — METHYLPREDNISOLONE 4 MG
TABLET, DOSE PACK ORAL
Qty: 1 KIT | Refills: 0 | Status: SHIPPED | OUTPATIENT
Start: 2024-09-03 | End: 2024-09-03

## 2024-09-03 NOTE — TELEPHONE ENCOUNTER
Patient complaining of sinus congestion, fever with chills at times, Headache, ear congestion. Wanting something called in. Patient has not tested for covid. Advised her that she would need to take test and let me know. Patient is currently in [place] and unable to come in.

## 2024-09-03 NOTE — TELEPHONE ENCOUNTER
Patient called back stating she tested positive for Covid. Per Dr. Trujillo: steroid pack and paxlovid sent to [facility] per patient request

## 2024-10-15 ENCOUNTER — OFFICE VISIT (OUTPATIENT)
Dept: NEUROSURGERY | Age: 59
End: 2024-10-15
Payer: COMMERCIAL

## 2024-10-15 VITALS
HEIGHT: 67 IN | WEIGHT: 142 LBS | DIASTOLIC BLOOD PRESSURE: 94 MMHG | SYSTOLIC BLOOD PRESSURE: 140 MMHG | HEART RATE: 92 BPM | BODY MASS INDEX: 22.29 KG/M2

## 2024-10-15 DIAGNOSIS — M51.34 DEGENERATIVE DISC DISEASE, THORACIC: ICD-10-CM

## 2024-10-15 DIAGNOSIS — M54.6 CHRONIC MIDLINE THORACIC BACK PAIN: Primary | ICD-10-CM

## 2024-10-15 DIAGNOSIS — G89.29 CHRONIC MIDLINE THORACIC BACK PAIN: Primary | ICD-10-CM

## 2024-10-15 PROCEDURE — 99203 OFFICE O/P NEW LOW 30 MIN: CPT | Performed by: NEUROLOGICAL SURGERY

## 2024-10-15 RX ORDER — METHOCARBAMOL 750 MG/1
750 TABLET, FILM COATED ORAL 3 TIMES DAILY PRN
Qty: 90 TABLET | Refills: 2 | Status: SHIPPED | OUTPATIENT
Start: 2024-10-15 | End: 2025-01-13

## 2024-10-15 RX ORDER — OMEPRAZOLE 40 MG/1
40 CAPSULE, DELAYED RELEASE ORAL
COMMUNITY
Start: 2024-07-16 | End: 2025-07-17

## 2024-10-15 ASSESSMENT — ENCOUNTER SYMPTOMS
BACK PAIN: 1
EYES NEGATIVE: 1
GASTROINTESTINAL NEGATIVE: 1
RESPIRATORY NEGATIVE: 1

## 2024-10-15 NOTE — PROGRESS NOTES
Review of Systems   Constitutional: Negative.    HENT: Negative.     Eyes: Negative.    Respiratory: Negative.     Cardiovascular: Negative.    Gastrointestinal: Negative.    Genitourinary: Negative.    Musculoskeletal:  Positive for back pain and myalgias.   Skin: Negative.    Neurological: Negative.    Endo/Heme/Allergies: Negative.    Psychiatric/Behavioral: Negative.         
  Spinal Cord/Cauda Equina: The conus terminates at L1.  Normal cord signal.     Paraspinal Soft Tissues: Normal.     Visualized Abdomen/Pelvis: Normal.     Level-By-Level Changes:      - L1-L2: The disk is normal in configuration.  There is no facet arthropathy.  There is no neural foraminal stenosis.  There is no spinal canal stenosis.      - L2-L3: The disk is normal in configuration.  There is no facet arthropathy.  There is no neural foraminal stenosis.  There is no spinal canal stenosis.      - L3-L4: Small disc bulge.  There is mild right facet arthropathy.  There is mild left neural foraminal stenosis.  There is no spinal canal stenosis.      - L4-L5: Circumferential disc bulge.  There is mild bilateral facet arthropathy.  There is mild to moderate left and mild right neural foraminal stenosis.  There is mild spinal canal stenosis.      - L5-S1: Right eccentric disc osteophyte complex which likely abuts the exiting right L5 nerve root.  There is mild bilateral facet arthropathy.  There is moderate to severe bilateral neural foraminal stenosis.  There is no spinal canal stenosis.     Visualized Sacrum and Iliac Wings: No significant abnormality.        IMPRESSION:     Right eccentric disc osteophyte complex at L5-S1 with likely abutment of the right L5 nerve root and associated moderate to severe bilateral neural foraminal stenosis.     Additional multilevel degenerative changes as detailed.        ______________________________________   Electronically signed by: ROSCOE MITTAL D.O.  Date:     06/03/2024    I have personally reviewed the images and my interpretation is:  There is significant degenerative disc disease at L4-5 with Modic changes  At L5-S1, there is a small left paracentral disc protrusion that slightly abuts the left L5 nerve root.  There is moderate left foraminal stenosis.        ASSESSMENT:    Nicki MARTÍNEZ KANDISMartin is a 59 y.o. female with thoracic back pain      ICD-10-CM    1. Chronic midline

## 2024-10-30 DIAGNOSIS — E34.9 HORMONAL DISORDER: ICD-10-CM

## 2024-10-30 DIAGNOSIS — E03.9 HYPOTHYROIDISM, UNSPECIFIED TYPE: ICD-10-CM

## 2024-10-30 LAB
25(OH)D3 SERPL-MCNC: 50.1 NG/ML
ALBUMIN SERPL-MCNC: 4.3 G/DL (ref 3.5–5.2)
ALP SERPL-CCNC: 69 U/L (ref 35–104)
ALT SERPL-CCNC: 14 U/L (ref 5–33)
ANION GAP SERPL CALCULATED.3IONS-SCNC: 10 MMOL/L (ref 7–19)
AST SERPL-CCNC: 17 U/L (ref 5–32)
BASOPHILS # BLD: 0 K/UL (ref 0–0.2)
BASOPHILS NFR BLD: 0.4 % (ref 0–1)
BILIRUB SERPL-MCNC: 0.5 MG/DL (ref 0.2–1.2)
BUN SERPL-MCNC: 13 MG/DL (ref 6–20)
CALCIUM SERPL-MCNC: 9.2 MG/DL (ref 8.6–10)
CHLORIDE SERPL-SCNC: 103 MMOL/L (ref 98–111)
CHOLEST SERPL-MCNC: 225 MG/DL (ref 0–199)
CO2 SERPL-SCNC: 26 MMOL/L (ref 22–29)
CORTIS AM PEAK SERPL-MCNC: 13.9 UG/DL (ref 4.8–19.5)
CREAT SERPL-MCNC: 0.7 MG/DL (ref 0.5–0.9)
EOSINOPHIL # BLD: 0.1 K/UL (ref 0–0.6)
EOSINOPHIL NFR BLD: 1.8 % (ref 0–5)
ERYTHROCYTE [DISTWIDTH] IN BLOOD BY AUTOMATED COUNT: 13.2 % (ref 11.5–14.5)
ESTRADIOL SERPL-SCNC: 32.7 PG/ML
GLUCOSE SERPL-MCNC: 82 MG/DL (ref 70–99)
HBA1C MFR BLD: 5 % (ref 4–5.6)
HCT VFR BLD AUTO: 47.3 % (ref 37–47)
HDLC SERPL-MCNC: 96 MG/DL (ref 40–60)
HGB BLD-MCNC: 15 G/DL (ref 12–16)
IMM GRANULOCYTES # BLD: 0 K/UL
LDLC SERPL CALC-MCNC: 114 MG/DL
LYMPHOCYTES # BLD: 1.3 K/UL (ref 1.1–4.5)
LYMPHOCYTES NFR BLD: 29.4 % (ref 20–40)
MCH RBC QN AUTO: 28.8 PG (ref 27–31)
MCHC RBC AUTO-ENTMCNC: 31.7 G/DL (ref 33–37)
MCV RBC AUTO: 91 FL (ref 81–99)
MONOCYTES # BLD: 0.4 K/UL (ref 0–0.9)
MONOCYTES NFR BLD: 9.2 % (ref 0–10)
NEUTROPHILS # BLD: 2.6 K/UL (ref 1.5–7.5)
NEUTS SEG NFR BLD: 59 % (ref 50–65)
PLATELET # BLD AUTO: 215 K/UL (ref 130–400)
PMV BLD AUTO: 10.4 FL (ref 9.4–12.3)
POTASSIUM SERPL-SCNC: 3.8 MMOL/L (ref 3.5–5)
PROGEST SERPL-MCNC: 1 NG/ML
PROT SERPL-MCNC: 7 G/DL (ref 6.4–8.3)
RBC # BLD AUTO: 5.2 M/UL (ref 4.2–5.4)
SODIUM SERPL-SCNC: 139 MMOL/L (ref 136–145)
T3FREE SERPL-MCNC: 2.4 PG/ML (ref 2–4.4)
T4 FREE SERPL-MCNC: 1.5 NG/DL (ref 0.93–1.7)
TESTOST SERPL-MCNC: 4 NG/DL (ref 2.9–40.8)
TRIGL SERPL-MCNC: 77 MG/DL (ref 0–149)
TSH SERPL DL<=0.005 MIU/L-ACNC: 1.88 UIU/ML (ref 0.27–4.2)
WBC # BLD AUTO: 4.5 K/UL (ref 4.8–10.8)

## 2024-11-01 ENCOUNTER — OFFICE VISIT (OUTPATIENT)
Dept: INTERNAL MEDICINE | Age: 59
End: 2024-11-01

## 2024-11-01 VITALS
BODY MASS INDEX: 22.6 KG/M2 | SYSTOLIC BLOOD PRESSURE: 110 MMHG | WEIGHT: 144 LBS | RESPIRATION RATE: 20 BRPM | DIASTOLIC BLOOD PRESSURE: 76 MMHG | OXYGEN SATURATION: 98 % | HEART RATE: 72 BPM | HEIGHT: 67 IN

## 2024-11-01 DIAGNOSIS — Z91.09 ENVIRONMENTAL ALLERGIES: ICD-10-CM

## 2024-11-01 DIAGNOSIS — F41.9 ANXIETY AND DEPRESSION: ICD-10-CM

## 2024-11-01 DIAGNOSIS — M25.512 CHRONIC LEFT SHOULDER PAIN: ICD-10-CM

## 2024-11-01 DIAGNOSIS — F32.A ANXIETY AND DEPRESSION: ICD-10-CM

## 2024-11-01 DIAGNOSIS — E55.9 VITAMIN D DEFICIENCY: ICD-10-CM

## 2024-11-01 DIAGNOSIS — N95.1 MENOPAUSAL SYMPTOMS: ICD-10-CM

## 2024-11-01 DIAGNOSIS — R32 URINARY INCONTINENCE, UNSPECIFIED TYPE: ICD-10-CM

## 2024-11-01 DIAGNOSIS — E03.9 HYPOTHYROIDISM, UNSPECIFIED TYPE: ICD-10-CM

## 2024-11-01 DIAGNOSIS — R10.12 LUQ ABDOMINAL PAIN: ICD-10-CM

## 2024-11-01 DIAGNOSIS — M54.50 CHRONIC LOW BACK PAIN, UNSPECIFIED BACK PAIN LATERALITY, UNSPECIFIED WHETHER SCIATICA PRESENT: ICD-10-CM

## 2024-11-01 DIAGNOSIS — K21.9 GASTROESOPHAGEAL REFLUX DISEASE WITHOUT ESOPHAGITIS: ICD-10-CM

## 2024-11-01 DIAGNOSIS — R10.12 LUQ ABDOMINAL PAIN: Primary | ICD-10-CM

## 2024-11-01 DIAGNOSIS — K58.9 IRRITABLE BOWEL SYNDROME, UNSPECIFIED TYPE: ICD-10-CM

## 2024-11-01 DIAGNOSIS — G89.29 CHRONIC LEFT SHOULDER PAIN: ICD-10-CM

## 2024-11-01 DIAGNOSIS — G89.29 CHRONIC LOW BACK PAIN, UNSPECIFIED BACK PAIN LATERALITY, UNSPECIFIED WHETHER SCIATICA PRESENT: ICD-10-CM

## 2024-11-01 LAB
DHEA-S SERPL-MCNC: 29 UG/DL (ref 19–205)
LIPASE SERPL-CCNC: 46 U/L (ref 13–60)

## 2024-11-01 RX ORDER — MELOXICAM 7.5 MG/1
7.5 TABLET ORAL DAILY PRN
Qty: 30 TABLET | Refills: 2 | Status: SHIPPED | OUTPATIENT
Start: 2024-11-01

## 2024-11-01 NOTE — PROGRESS NOTES
and dry.      Capillary Refill: Capillary refill takes less than 2 seconds.   Neurological:      General: No focal deficit present.      Mental Status: She is alert and oriented to person, place, and time. Mental status is at baseline.   Psychiatric:         Mood and Affect: Mood normal.         Behavior: Behavior normal.         Thought Content: Thought content normal.         Judgment: Judgment normal.         1. LUQ abdominal pain    2. Urinary incontinence, unspecified type    3. Chronic low back pain, unspecified back pain laterality, unspecified whether sciatica present    4. Chronic left shoulder pain    5. Menopausal symptoms    6. Anxiety and depression    7. Irritable bowel syndrome, unspecified type    8. Hypothyroidism, unspecified type    9. Gastroesophageal reflux disease without esophagitis    10. Vitamin D deficiency    11. Environmental allergies        ASSESSMENT/PLAN:    59-year-old woman here for follow-up    1.  Abdominal pain: Uncertain etiology at this time.  CT scan ordered.  CBC CMP and lipase ordered.  Since the pain does radiate into her chest, it could be a very atypical presentation for angina.  I will check an EKG.  EKG done in our office today showed sinus rhythm with a left axis fascicular block.  If her CT scan and lab's are within normal limits, we will consider GI referral and possibly cardiac workup.    2.  Urinary incontinence: Pelvic floor exercises ordered.  Refer to physical therapy for this.    3.  Chronic back pain/left shoulder pain: Mobic and Robaxin as prescribed.  Baclofen as needed    4.  Menopausal symptoms: Continue progesterone, Climara and testosterone supplementation as prescribed    5.  Depression: Stable Cymbalta    6.  IBS: Hyoscyamine and Carafate as needed    7.  Hypothyroidism: Continue levothyroxine at current dose    8.  Acid reflux: Continue Protonix and Prilosec    9.  Vitamin D deficiency: Continue vitamin D supplementation    10.  Environmental

## 2024-11-05 ENCOUNTER — OFFICE VISIT (OUTPATIENT)
Dept: ENT CLINIC | Age: 59
End: 2024-11-05
Payer: COMMERCIAL

## 2024-11-05 VITALS
SYSTOLIC BLOOD PRESSURE: 118 MMHG | HEIGHT: 67 IN | DIASTOLIC BLOOD PRESSURE: 72 MMHG | WEIGHT: 144 LBS | BODY MASS INDEX: 22.6 KG/M2

## 2024-11-05 DIAGNOSIS — Z22.322 NOSE COLONIZED WITH MRSA: Primary | ICD-10-CM

## 2024-11-05 DIAGNOSIS — R32 URINARY INCONTINENCE, UNSPECIFIED TYPE: Primary | ICD-10-CM

## 2024-11-05 PROCEDURE — 99213 OFFICE O/P EST LOW 20 MIN: CPT | Performed by: PHYSICIAN ASSISTANT

## 2024-11-05 RX ORDER — MUPIROCIN 20 MG/G
OINTMENT TOPICAL
Qty: 15 G | Refills: 2 | Status: SHIPPED | OUTPATIENT
Start: 2024-11-05 | End: 2024-11-12

## 2024-11-05 ASSESSMENT — ENCOUNTER SYMPTOMS
SINUS PRESSURE: 0
VOICE CHANGE: 0
SORE THROAT: 0
SINUS PAIN: 0
TROUBLE SWALLOWING: 0
EYE PAIN: 0
RHINORRHEA: 0
FACIAL SWELLING: 0
EYE DISCHARGE: 0

## 2024-11-05 NOTE — PROGRESS NOTES
[facility] OTOLARYNGOLOGY/ENT  Nicki is a pleasant 59-year-old  female that was referred by Dr. Lauren Trujlilo due to problems with a lesion to the left nare with tenderness.  She reports that this has been occurring off and on for about 4 to 6 weeks.  She has been treating this with Neosporin ointment with the lesions to briefly improve and then recur.  Due to this persisting she is requesting evaluation.  Patient denies a history of nasal polyps.        Allergies: Celebrex [celecoxib] and Nsaids      Current Outpatient Medications   Medication Sig Dispense Refill    mupirocin (BACTROBAN) 2 % ointment Apply topically to each nostril 3 times a day x 14 days 15 g 2    meloxicam (MOBIC) 7.5 MG tablet Take 1 tablet by mouth daily as needed for Pain 30 tablet 2    omeprazole (PRILOSEC) 40 MG delayed release capsule Take 1 capsule by mouth      methocarbamol (ROBAXIN-750) 750 MG tablet Take 1 tablet by mouth 3 times daily as needed (spasms) 90 tablet 2    estradiol (CLIMARA) 0.05 MG/24HR Place 1 patch onto the skin once a week 4 patch 3    busPIRone (BUSPAR) 5 MG tablet Take 1 tablet by mouth 3 times daily 90 tablet 1    progesterone (PROMETRIUM) 100 MG CAPS capsule Take 1 capsule by mouth daily 30 capsule 2    levothyroxine (SYNTHROID) 112 MCG tablet Take 1 tablet by mouth daily 90 tablet 1    pantoprazole (PROTONIX) 40 MG tablet TAKE ONE TABLET EVERY MORNING BEFORE BREAKFAST 90 tablet 1    vitamin D (ERGOCALCIFEROL) 1.25 MG (95013 UT) CAPS capsule TAKE 1 CAPSULE BY MOUTH ONE TIME WEEKLY 12 capsule 3    sucralfate (CARAFATE) 1 GM/10ML suspension Take 10 mLs by mouth 4 times daily 1200 mL 3    baclofen (LIORESAL) 10 MG tablet Take 1 tablet by mouth 3 times daily As needed for pain 30 tablet 1    DULoxetine (CYMBALTA) 30 MG extended release capsule Take 1 capsule by mouth daily 30 capsule 5    Hyoscyamine Sulfate SL (LEVSIN/SL) 0.125 MG SUBL Place 1 tablet under the tongue 3 times daily as needed (abdominal

## 2024-11-05 NOTE — ASSESSMENT & PLAN NOTE
MRSA of the left nostril-apex  Plan: I recommended treating this with mupirocin ointment 3 times a day for 14 days.  She is follow-up in 2 weeks for reevaluation.  She was reminded to call if she has any questions or concerns.

## 2024-11-18 SDOH — ECONOMIC STABILITY: FOOD INSECURITY: WITHIN THE PAST 12 MONTHS, YOU WORRIED THAT YOUR FOOD WOULD RUN OUT BEFORE YOU GOT MONEY TO BUY MORE.: NEVER TRUE

## 2024-11-18 SDOH — ECONOMIC STABILITY: FOOD INSECURITY: WITHIN THE PAST 12 MONTHS, THE FOOD YOU BOUGHT JUST DIDN'T LAST AND YOU DIDN'T HAVE MONEY TO GET MORE.: NEVER TRUE

## 2024-11-18 SDOH — ECONOMIC STABILITY: INCOME INSECURITY: HOW HARD IS IT FOR YOU TO PAY FOR THE VERY BASICS LIKE FOOD, HOUSING, MEDICAL CARE, AND HEATING?: NOT HARD AT ALL

## 2024-11-18 ASSESSMENT — ENCOUNTER SYMPTOMS
EYE ITCHING: 0
RHINORRHEA: 0
EYE REDNESS: 0
CONSTIPATION: 0
ABDOMINAL PAIN: 1
NAUSEA: 0
SINUS PRESSURE: 0
EYE PAIN: 0
ABDOMINAL DISTENTION: 0
EYE DISCHARGE: 0
COLOR CHANGE: 0
COUGH: 0
TROUBLE SWALLOWING: 0
BACK PAIN: 1
CHEST TIGHTNESS: 0
SORE THROAT: 0
DIARRHEA: 0
SHORTNESS OF BREATH: 0
CHOKING: 0
VOICE CHANGE: 0
RECTAL PAIN: 0
WHEEZING: 0
BLOOD IN STOOL: 0
ANAL BLEEDING: 0
PHOTOPHOBIA: 0
FACIAL SWELLING: 0
VOMITING: 0
SINUS PAIN: 0

## 2024-11-18 ASSESSMENT — PATIENT HEALTH QUESTIONNAIRE - PHQ9
SUM OF ALL RESPONSES TO PHQ QUESTIONS 1-9: 0
9. THOUGHTS THAT YOU WOULD BE BETTER OFF DEAD, OR OF HURTING YOURSELF: NOT AT ALL
4. FEELING TIRED OR HAVING LITTLE ENERGY: NOT AT ALL
3. TROUBLE FALLING OR STAYING ASLEEP: NOT AT ALL
SUM OF ALL RESPONSES TO PHQ9 QUESTIONS 1 & 2: 0
10. IF YOU CHECKED OFF ANY PROBLEMS, HOW DIFFICULT HAVE THESE PROBLEMS MADE IT FOR YOU TO DO YOUR WORK, TAKE CARE OF THINGS AT HOME, OR GET ALONG WITH OTHER PEOPLE: NOT DIFFICULT AT ALL
6. FEELING BAD ABOUT YOURSELF - OR THAT YOU ARE A FAILURE OR HAVE LET YOURSELF OR YOUR FAMILY DOWN: NOT AT ALL
SUM OF ALL RESPONSES TO PHQ QUESTIONS 1-9: 0
2. FEELING DOWN, DEPRESSED OR HOPELESS: NOT AT ALL
8. MOVING OR SPEAKING SO SLOWLY THAT OTHER PEOPLE COULD HAVE NOTICED. OR THE OPPOSITE, BEING SO FIGETY OR RESTLESS THAT YOU HAVE BEEN MOVING AROUND A LOT MORE THAN USUAL: NOT AT ALL
7. TROUBLE CONCENTRATING ON THINGS, SUCH AS READING THE NEWSPAPER OR WATCHING TELEVISION: NOT AT ALL
SUM OF ALL RESPONSES TO PHQ QUESTIONS 1-9: 0
1. LITTLE INTEREST OR PLEASURE IN DOING THINGS: NOT AT ALL
SUM OF ALL RESPONSES TO PHQ QUESTIONS 1-9: 0
5. POOR APPETITE OR OVEREATING: NOT AT ALL

## 2024-11-19 ENCOUNTER — HOSPITAL ENCOUNTER (OUTPATIENT)
Dept: PHYSICAL THERAPY | Age: 59
Setting detail: THERAPIES SERIES
Discharge: HOME | End: 2024-11-19
Payer: COMMERCIAL

## 2024-11-19 ENCOUNTER — HOSPITAL ENCOUNTER (OUTPATIENT)
Dept: OCCUPATIONAL THERAPY | Age: 59
Setting detail: THERAPIES SERIES
Discharge: HOME | End: 2024-11-19
Payer: COMMERCIAL

## 2024-11-19 DIAGNOSIS — N39.3 STRESS INCONTINENCE OF URINE: Primary | ICD-10-CM

## 2024-11-19 PROCEDURE — 97530 THERAPEUTIC ACTIVITIES: CPT

## 2024-11-19 PROCEDURE — 97167 OT EVAL HIGH COMPLEX 60 MIN: CPT

## 2024-11-19 PROCEDURE — 97163 PT EVAL HIGH COMPLEX 45 MIN: CPT

## 2024-11-19 ASSESSMENT — PAIN DESCRIPTION - ORIENTATION: ORIENTATION: LEFT

## 2024-11-19 ASSESSMENT — PAIN SCALES - GENERAL: PAINLEVEL_OUTOF10: 2

## 2024-11-19 ASSESSMENT — PAIN DESCRIPTION - DESCRIPTORS: DESCRIPTORS: ACHING

## 2024-11-19 ASSESSMENT — PAIN DESCRIPTION - LOCATION: LOCATION: BACK;CHEST

## 2024-11-19 ASSESSMENT — PAIN DESCRIPTION - PAIN TYPE: TYPE: CHRONIC PAIN

## 2024-11-19 NOTE — PROGRESS NOTES
Occupational Therapy: Initial Evaluation    Patient: Nicki Rubio (59 y.o. female)   Examination Date: 2024  Plan of Care Certification Period:2024 to 25      :  1965 MRN: [id number]  CSN: [id number]   Insurance: Payor: [employer] / Plan: [employer] [facility] [employer] EMPLOYEES [place] / Product Type: *No Product type* /   Insurance ID: [id number] - ([facility]) Secondary Insurance (if applicable):    Referring Physician: Lauren Trujillo MD Melissa Purvis   PCP: Lauren Trujillo MD Visits to Date/Visits Approved:     No Show/Cancelled Appts:   /       Medical Diagnosis: Unspecified urinary incontinence [R32]    Treatment Diagnosis: Performance deficits / Impairments: Decreased strength, Decreased endurance, Decreased coordination, Decreased ADL status (pelvic pain)     PERTINENT MEDICAL HISTORY      Self reported health status:: Good    Medical History:     Past Medical History:   Diagnosis Date    Acid reflux     Anemia     BRCA negative     Chronic back pain     GERD (gastroesophageal reflux disease)     Hypothyroidism     Irritable bowel syndrome     Premature atrial contraction     Skipped heart beats     Thyroid disease     Thyroid mass     Vitamin D deficiency      Surgical History:  Past Surgical History:   Procedure Laterality Date    BREAST BIOPSY Right 2012    benign    BREAST ENHANCEMENT SURGERY Bilateral     saline,retro-pectoral    BREAST SURGERY      Augmentation,Biopsy+    CHOLECYSTECTOMY      COLONOSCOPY  2005    Dr Moctezuma-BCM, no active colitis    COLONOSCOPY  2012    Dr Keyes-Redundant colon    COLONOSCOPY N/A 2021    Dr MUSA Escamilla-HP, 5 yr recall    HEMORRHOID SURGERY      THYROID SURGERY      Biopsy    THYROID SURGERY      nodule removed    TONSILLECTOMY      TUBAL LIGATION      UPPER GASTROINTESTINAL ENDOSCOPY  2005    Dr Moctezuma-No celiac, gastritis    UPPER GASTROINTESTINAL ENDOSCOPY  2015    Dr Keyes-Kim neg, mild antral gastritis    UPPER

## 2024-11-19 NOTE — PROGRESS NOTES
weeks Goal Status   Patient will improve ADL as demonstrated by mprovement of Oswestry Disability Index from 16% impairment to <10% impairment.     Patient will perform HEP with no cuing.     Patient will demonstrate safe lifting posture of 25# with no pain with 12\" to knuckle lift using lifing crate.     Patient will increase prone plank time to 60 sec with less pain.     Patient will increase lumbar flexion by 10 degrees with min pain increase.                                        TREATMENT PLAN       Requires PT Follow-Up: Yes    Pt. actively involved in establishing Plan of Care and Goals: Yes  Patient/ Caregiver education and instruction:               Treatment may include any combination of the following: Current Treatment Recommendations: Strengthening, ROM, Functional mobility training, ADL/Self-care training, IADL training, Manual, Neuromuscular re-education, Endurance training, Pain management, Home exercise program, Therapeutic activities, Modalities  Modalities: Heat/Cold, Mechanical Traction, Ultrasound, E-stim - unattended     Frequency / Duration:  Patient to be seen 2 X weekl y for 6-8 weeks weeks      Eval Complexity: Overall Evaluation : High  Decision Making: High Complexity  History: Personal Factors and/or Comorbidities Impacting POC: High  Examination of body system(s) including body structures and functions, activity limitations, and/or participation restrictions: High  Clinical Presentation: High  Clinical Decision Making : High Complexity    PT Treatment Completed:  Exercises:      Treatment Reasoning    Exercise 1: 6 MWT  Exercise 2: Supine, TA series, 10 reps  Exercise 3: Supine, lower trunk rotation, 10 reps  Exercise 4: Supine, bridges, 10 reps  Exercise 5: Supine, check leg length, treat accordingly, (at initial eval RLE>LLE.  Try SI belt and/or heel leift as needed.  Exercise 6: Supine, 1 JUANCARLOS, 2 JUANCARLOS, 10 reps  Exercise 7: Supine, pelvic tilt, 10 reps  Exercise 8: Supine, partial

## 2024-11-20 ENCOUNTER — HOSPITAL ENCOUNTER (OUTPATIENT)
Dept: CT IMAGING | Age: 59
Discharge: HOME | End: 2024-11-20
Attending: INTERNAL MEDICINE
Payer: COMMERCIAL

## 2024-11-20 ENCOUNTER — PATIENT MESSAGE (OUTPATIENT)
Dept: INTERNAL MEDICINE | Age: 59
End: 2024-11-20

## 2024-11-20 ENCOUNTER — OFFICE VISIT (OUTPATIENT)
Dept: ENT CLINIC | Age: 59
End: 2024-11-20

## 2024-11-20 VITALS
DIASTOLIC BLOOD PRESSURE: 68 MMHG | BODY MASS INDEX: 22.13 KG/M2 | HEIGHT: 67 IN | WEIGHT: 141 LBS | SYSTOLIC BLOOD PRESSURE: 100 MMHG

## 2024-11-20 DIAGNOSIS — R10.12 LUQ ABDOMINAL PAIN: ICD-10-CM

## 2024-11-20 DIAGNOSIS — Z22.322 NOSE COLONIZED WITH MRSA: Primary | ICD-10-CM

## 2024-11-20 DIAGNOSIS — R07.89 CHEST DISCOMFORT: Primary | ICD-10-CM

## 2024-11-20 PROCEDURE — 6360000004 HC RX CONTRAST MEDICATION: Performed by: INTERNAL MEDICINE

## 2024-11-20 PROCEDURE — 74170 CT ABD WO CNTRST FLWD CNTRST: CPT

## 2024-11-20 RX ORDER — DOXYCYCLINE HYCLATE 100 MG
100 TABLET ORAL 2 TIMES DAILY
Qty: 20 TABLET | Refills: 0 | Status: SHIPPED | OUTPATIENT
Start: 2024-11-20 | End: 2024-11-30

## 2024-11-20 RX ORDER — IOPAMIDOL 755 MG/ML
70 INJECTION, SOLUTION INTRAVASCULAR
Status: COMPLETED | OUTPATIENT
Start: 2024-11-20 | End: 2024-11-20

## 2024-11-20 RX ADMIN — IOPAMIDOL 70 ML: 755 INJECTION, SOLUTION INTRAVENOUS at 12:21

## 2024-11-20 NOTE — PROGRESS NOTES
Nicki is a pleasant 59-year-old  female that presents for a 2-week follow-up after treatment for MRSA of the left nare.  She reports that the anterior portion of the nose feels much improved however she is still having some soreness to the posterior aspects.  Denies any issues with drainage or any fever or chills.      Physical examination demonstrated the microabscesses to be absent to the left anterior nare.  She was noted to have a prominent area to the midportion of the septum with no obvious abscess appreciated.  Nasopharyngoscopy was performed after anesthetizing the area with lidocaine.  This demonstrated no abnormalities to the posterior aspects of the nasopharyngeal region bilaterally.  The prominent area demonstrated no obvious abscess however it appeared to be a combination of deviated septum with edema of the septum.  This was noted to be tender to palpation.  Right side demonstrated no remarkable findings.  Oral exam demonstrated no abnormalities to the posterior pharynx with normal surface of tongue.  Neck exam demonstrated no lymphadenopathy or thyromegaly.      Impression: Clinically resolving MRSA of the left nare with questionable infection of the left mid septum    Plan: I recommended placing the patient on a 10-day course of doxycycline and to continue the mupirocin ointment.  She is to follow-up in 2 weeks for reevaluation.  She was reminded to call if she has any changes or has any questions.      Electronically signed by LENNIE WILSON PA-C on 11/20/24 at 2:57 PM CST

## 2024-11-21 ENCOUNTER — HOSPITAL ENCOUNTER (OUTPATIENT)
Dept: WOMENS IMAGING | Age: 59
Discharge: HOME | End: 2024-11-21
Payer: COMMERCIAL

## 2024-11-21 ENCOUNTER — HOSPITAL ENCOUNTER (OUTPATIENT)
Dept: OCCUPATIONAL THERAPY | Age: 59
Setting detail: THERAPIES SERIES
Discharge: HOME | End: 2024-11-21
Payer: COMMERCIAL

## 2024-11-21 ENCOUNTER — HOSPITAL ENCOUNTER (OUTPATIENT)
Dept: PHYSICAL THERAPY | Age: 59
Setting detail: THERAPIES SERIES
Discharge: HOME | End: 2024-11-21
Payer: COMMERCIAL

## 2024-11-21 VITALS — BODY MASS INDEX: 21.92 KG/M2 | WEIGHT: 140 LBS

## 2024-11-21 DIAGNOSIS — Z12.31 ENCOUNTER FOR SCREENING MAMMOGRAM FOR MALIGNANT NEOPLASM OF BREAST: ICD-10-CM

## 2024-11-21 PROCEDURE — 97530 THERAPEUTIC ACTIVITIES: CPT

## 2024-11-21 PROCEDURE — 77067 SCR MAMMO BI INCL CAD: CPT

## 2024-11-21 PROCEDURE — 97110 THERAPEUTIC EXERCISES: CPT

## 2024-11-21 ASSESSMENT — PAIN SCALES - GENERAL: PAINLEVEL_OUTOF10: 4

## 2024-11-21 ASSESSMENT — PAIN DESCRIPTION - PAIN TYPE: TYPE: CHRONIC PAIN

## 2024-11-21 ASSESSMENT — PAIN DESCRIPTION - LOCATION: LOCATION: BACK

## 2024-11-21 ASSESSMENT — PAIN DESCRIPTION - ORIENTATION: ORIENTATION: LOWER;MID;LEFT;RIGHT

## 2024-11-21 NOTE — PROGRESS NOTES
balance  Assessment: Initiated stretches and exercises per evaluating therapist.  Unable to complete all exercises this visit.  STM performed to streno-costal and paraspinal muscles on left side.  She reported some soreness and tenderness in that area.  Post STM with biofrezze she reports feeling some better and was able to take a deeper breath before discomfort increased.  Treatment Diagnosis: Lumbar facet arthropathy, SIJ dysfunction, Costochondritis with associated pain and dysfunction  Therapy Prognosis: Good      Goals:Short Term Goals  Time Frame for Short Term Goals: 3-4 weeks  Short Term Goal 1: Patient will improve ADL as demonstrated by mprovement of Oswestry Disability Index from 16% impairment to 10% impairment.  Short Term Goal 2: Patient will perform HEP with min cuing.  Short Term Goal 3: Patient will demonstrate safe lifting posture with min pain with 12\" to knuckle lift using lifing crate.  Short Term Goal 4: Patient will increase prone plank time to 30 sec with less pain.  Short Term Goal 5: Patient will increase lumbar flexion by 5 degrees with min pain increase.  Long Term Goals  Time Frame for Long Term Goals : 6-8 weeks  Long Term Goal 1: Patient will improve ADL as demonstrated by mprovement of Oswestry Disability Index from 16% impairment to <10% impairment.  Long Term Goal 2: Patient will perform HEP with no cuing.  Long Term Goal 3: Patient will demonstrate safe lifting posture of 25# with no pain with 12\" to knuckle lift using lifing crate.  Long Term Goal 4: Patient will increase prone plank time to 60 sec with less pain.  Long Term Goal 5: Patient will increase lumbar flexion by 10 degrees with min pain increase.  Patient Goals   Patient Goals : Less pain    Plan:    Physical Therapy Plan  Plan weeks: 6-8 weeks  Current Treatment Recommendations: Strengthening, ROM, Functional mobility training, ADL/Self-care training, IADL training, Manual, Neuromuscular re-education, Endurance

## 2024-11-21 NOTE — PROGRESS NOTES
Occupational Therapy: Daily Treatment Note    Patient: Nicki Rubio (59 y.o. female)   Examination Date:    Plan of Care Certification Period:  to Certification Period Expiration Date: 25   :  1965 MRN: [id number]  CSN: [id number]   Insurance: Payor: [employer] GARCIA / Plan: [employer] EM[employer]OYEES [place] / Product Type: *No Product type* /   Insurance ID: [id number] - ([facility]) Secondary Insurance (if applicable): [facility]   Referring Physician: Lauren Trujillo MD Melissa Purvis   PCP: Lauren Trujillo MD Visits to Date/Visits Approved:      Medical Diagnosis: Unspecified urinary incontinence [R32]    Treatment Diagnosis: Treatment Diagnosis: decreased pfm and core strength, endurance and coordination; pain with intercourse, external pfm sensitivity/pain    Prognosis: Good    Subjective  Subjective: I did those exercises for my ab muscles yesterday and I was a little sore afterwards.     Treatment Activities     Exercises:   OT Exercise 1: Supine legs up wall/wall stretches  OT Exercise 2: B hamstring stretch 3 x 15 seconds; hip IR 3 x 15 seconds, hip 3 ER 3 x 15 seconds; Piraformis stretch 3 x 15 seconds < 90, > 90  OT Exercise 3: Happy baby, praying pose in prone on mat, forward bend seated on stool  OT Exercise 12: Down training with biofeedback; pt able to achieve relaxation of 2/3/4 after 3 minutes of relaxation       24 1144   Activity Tolerance   Activity Tolerance Patient tolerated treatment well      24 1135   OT Education   OT Education OT Role;Home Exercise Program;Plan of Care;Anatomy of condition   Barriers impacting rehab  None   Measures taken to address barrier(s) N/A   Current home exercise program (HEP) bladder diary, pelvic wand for self massage, ta contraction, focus on stretching and relaxation     ASSESSMENT     Impression:    Assessment: Pt did well in session.  She reports she has not yet ordered wand, but plans to do so, and shared the information with a friend.

## 2024-11-22 DIAGNOSIS — E34.9 HORMONAL DISORDER: ICD-10-CM

## 2024-11-22 DIAGNOSIS — E03.9 HYPOTHYROIDISM, UNSPECIFIED TYPE: ICD-10-CM

## 2024-11-22 RX ORDER — LEVOTHYROXINE SODIUM 112 UG/1
112 TABLET ORAL DAILY
Qty: 90 TABLET | Refills: 1 | Status: SHIPPED | OUTPATIENT
Start: 2024-11-22

## 2024-11-22 RX ORDER — ESTRADIOL 0.05 MG/D
1 PATCH TRANSDERMAL WEEKLY
Qty: 4 PATCH | Refills: 3 | Status: SHIPPED | OUTPATIENT
Start: 2024-11-22

## 2024-11-25 ENCOUNTER — HOSPITAL ENCOUNTER (OUTPATIENT)
Dept: PHYSICAL THERAPY | Age: 59
Setting detail: THERAPIES SERIES
Discharge: HOME | End: 2024-11-25
Payer: COMMERCIAL

## 2024-11-25 ENCOUNTER — HOSPITAL ENCOUNTER (OUTPATIENT)
Dept: OCCUPATIONAL THERAPY | Age: 59
Setting detail: THERAPIES SERIES
Discharge: HOME | End: 2024-11-25
Payer: COMMERCIAL

## 2024-11-25 PROCEDURE — 97110 THERAPEUTIC EXERCISES: CPT

## 2024-11-25 PROCEDURE — 97530 THERAPEUTIC ACTIVITIES: CPT

## 2024-11-25 PROCEDURE — G0283 ELEC STIM OTHER THAN WOUND: HCPCS

## 2024-11-25 NOTE — PROGRESS NOTES
Occupational Therapy: Daily Treatment Note    Patient: Nicki Rubio (59 y.o. female)   Examination Date:    Plan of Care Certification Period:  to Certification Period Expiration Date: 25   :  1965 MRN: [id number]  CSN: [id number]   Insurance: Payor: PRUDENC[employer] GARCIA / Plan: [employer] [facility] EM[employer]OYEES [place] / Product Type: *No Product type* /   Insurance ID: [id number] - ([facility]) Secondary Insurance (if applicable): [facility]   Referring Physician: Lauren Trujillo MD Melissa Purvis   PCP: Lauren Trujillo MD Visits to Date/Visits Approved: 3 / 30     Medical Diagnosis: Unspecified urinary incontinence [R32]    Treatment Diagnosis: Treatment Diagnosis: decreased pfm and core strength, endurance and coordination; pain with intercourse, external pfm sensitivity/pain    Prognosis: Good    Subjective  Subjective: I can't believe I was able to get that relaxed today.     Treatment Activities     Exercises:   OT Exercise 1: Supine legs up wall/wall stretches  OT Exercise 2: B hamstring stretch 3 x 15 seconds; hip IR 3 x 15 seconds, hip 3 ER 3 x 15 seconds; Piraformis stretch 3 x 15 seconds < 90, > 90  OT Exercise 3: Happy baby, praying pose in prone on mat, forward bend seated on stool  OT Exercise 6: Supine transverse abdominis contraction alone 5 x 10 seconds x 1 set; poor endurance; difficulty initiating when fatigued  OT Exercise 7: Supine transverse abdominis contraction alternate UE 10 reps x 1 set; able to complete all 10 reps consecutively  OT Exercise 8: Supine transverse abdominis contraction alternate LE 10 reps x 1 set; able to complete all 10 reps consecutively  OT Exercise 9: Supine transverse abdominis contraction alternate UE/LE 5 reps x 1 set  OT Exercise 10: Supine transverse abdominis contraction with bridge 1 x 10  OT Exercise 12: Down training with biofeedback; pt able to achieve relaxation of 2/3 after 3-4 minutes of relaxation      ASSESSMENT     Impression:    Assessment: Pt did well in

## 2024-11-25 NOTE — PROGRESS NOTES
*increased pain with last plank rep  Exercise 9: Sitting, LAQ, 3#, 10 reps  Exercise 10: Sitting, sit to stand, mat to chair progression, 10 reps  Exercise 11: Standing, multifidus row, Paloff press, shoulder retraction, 10 reps  Exercise 12: Standing, hip abd, ext, mini squat, heel raise- not today  Exercise 13: Standing, press down on top of physio ball for abd contraction, 10 reps- not today  Exercise 14: LBE, 5 min- not today  Exercise 15: IFC wth MH x 15 min  Exercise 16: ILT, 1/2 body wt, clinic protocols, as indicated.  *try next visit, traction machine in use today therefore unable to perform  Exercise 17: IASTM to paraspinal mm and/or sterno-costal areas, 3-5 min- not today, gentle stm on posterior paraspinals x 3 min with rib mobilizations today, grade 3 medial 6 and 7th rib mwm in seated position, posterior/medial glide grade 2 mob to 7th and 8th rib in supine, gentle pa mobs throacic spine x 1 min   Exercise 18: Liting instruction, empty crate                          Pt Education: Additional Comments: spoke with evaluating therapist Konstantin Castaneda PT and he was agreeable to increase frequency to 2-3x per week at patient's request.       ASSESSMENT     Assessment: Assessment: Patient did well with tx today with good tolerance to ex's and ability to complete increased ex's demo'ing increased activity tolerance. She needed mod v.c. and occassional tactile cues for proper tech with ex's. She did have noted decreased mobility throughout thoracic segments and ribs with manual therapy increasing mobilty.  She tolerated all manual therapy well, but did have some soreness with pa mobs. She was unable to trial traction today due to machine in use but will plan to try next visit.  Body Structures, Functions, Activity Limitations Requiring Skilled Therapeutic Intervention: Decreased functional mobility , Decreased ADL status, Decreased ROM, Decreased strength, Decreased endurance, Increased pain, Decreased high-level

## 2024-11-26 ENCOUNTER — HOSPITAL ENCOUNTER (OUTPATIENT)
Dept: WOMENS IMAGING | Age: 59
Discharge: HOME | End: 2024-11-26
Payer: COMMERCIAL

## 2024-11-26 DIAGNOSIS — R92.8 ABNORMAL MAMMOGRAM: ICD-10-CM

## 2024-11-26 DIAGNOSIS — R92.8 ABNORMAL MAMMOGRAM OF RIGHT BREAST: Primary | ICD-10-CM

## 2024-11-26 PROCEDURE — 77065 DX MAMMO INCL CAD UNI: CPT

## 2024-12-02 ENCOUNTER — HOSPITAL ENCOUNTER (OUTPATIENT)
Dept: OCCUPATIONAL THERAPY | Age: 59
Setting detail: THERAPIES SERIES
Discharge: HOME | End: 2024-12-02
Payer: COMMERCIAL

## 2024-12-02 ENCOUNTER — HOSPITAL ENCOUNTER (OUTPATIENT)
Dept: PHYSICAL THERAPY | Age: 59
Setting detail: THERAPIES SERIES
Discharge: HOME | End: 2024-12-02
Payer: COMMERCIAL

## 2024-12-02 PROCEDURE — 97110 THERAPEUTIC EXERCISES: CPT

## 2024-12-02 PROCEDURE — 97530 THERAPEUTIC ACTIVITIES: CPT

## 2024-12-02 PROCEDURE — 97012 MECHANICAL TRACTION THERAPY: CPT

## 2024-12-02 ASSESSMENT — PAIN DESCRIPTION - LOCATION
LOCATION: BACK
LOCATION: BACK;RIB CAGE

## 2024-12-02 ASSESSMENT — PAIN DESCRIPTION - PAIN TYPE
TYPE: ACUTE PAIN;CHRONIC PAIN
TYPE: ACUTE PAIN;CHRONIC PAIN

## 2024-12-02 ASSESSMENT — PAIN SCALES - GENERAL
PAINLEVEL_OUTOF10: 4
PAINLEVEL_OUTOF10: 3

## 2024-12-02 ASSESSMENT — PAIN DESCRIPTION - ORIENTATION: ORIENTATION: LOWER;MID;LEFT

## 2024-12-02 NOTE — PROGRESS NOTES
Physical Therapy  Daily Treatment Note  Date: 2024  Patient Name: Nicki Rubio  MRN: [id number]     :   1965    Subjective:   General  Additional Pertinent Hx: 59 year old female has been referred for PT eval and treatment for low back pain.  She relates long history of back pain since birth of daughter.  She has developed left thoracic pain, anterior and posterior pain.  MRI of : FINDINGS:     Segmentation: No transitional anatomy.  The lowest well-developed disk space is labeled L5-S1.     Post-Surgical Changes/Hardware: None.     Alignment: Mild levocurvature.  Straightening of the normal lumbar lordosis.  Grade 1 stepwise retrolisthesis of L4-S1.     Vertebrae/Bone marrow: No compression fracture.  Mixed type 1 and type 2 degenerative endplate changes of L4-S1.  T12, one, and L3 osseous hemangiomas.     Disk Disease: Moderate degenerative disc disease of L4-L5 and mild of L3-L4, and L5-S1 with narrowing and disc desiccation.  L3-S1 annular fissures.     Spinal Cord/Cauda Equina: The conus terminates at L1.  Normal cord signal.     Paraspinal Soft Tissues: Normal.     Visualized Abdomen/Pelvis: Normal.     Level-By-Level Changes:      - L1-L2: The disk is normal in configuration.  There is no facet arthropathy.  There is no neural foraminal stenosis.  There is no spinal canal stenosis.      - L2-L3: The disk is normal in configuration.  There is no facet arthropathy.  There is no neural foraminal stenosis.  There is no spinal canal stenosis.      - L3-L4: Small disc bulge.  There is mild right facet arthropathy.  There is mild left neural foraminal stenosis.  There is no spinal canal stenosis.      - L4-L5: Circumferential disc bulge.  There is mild bilateral facet arthropathy.  There is mild to moderate left and mild right neural foraminal stenosis.  There is mild spinal canal stenosis.      - L5-S1: Right eccentric disc osteophyte complex which likely abuts the exiting right L5 nerve root.

## 2024-12-02 NOTE — PROGRESS NOTES
Term Goal 2: Pt to report decreased overall urinary leakage by 50%  STG Goal 2 Status:: New  Short Term Goal 3: Pt to report decreased overall stress urinary incontinence by 50%  STG Goal 3 Status:: New  Short Term Goal 4: Pt to be independent with HEP, with upgrades as appropriate  STG Goal 4 Status:: New  Short Term Goal 5: Pt to report decreased pain with intercourse by 50%  STG Goal 5 Status:: New       Long Term Goals Completed by 12 weeks   Long Term Goals  Time Frame for Long Term Goals : 12 weeks  Long Term Goal 1: Pt to have PERF score of 3/10/15/20  LTG Goal 1 Status:: New  Long Term Goal 2: Pt to report decreased overall urinary leakage by 75%  LTG Goal 2 Status:: New  Long Term Goal 3: Pt to report decreased overall stress urinary incontinence by 75%  LTG Goal 3 Status:: New  Long Term Goal 4: Pt to have PFIQ impairment score of </=4%  LTG Goal 4 Status:: New  Long Term Goal 5: Pt to be independent with comprehensive/finalized HEP  LTG Goal 5 Status:: New  Additional Goals?: Yes  Long Term Goal 6: Pt to report decreased pain with intercourse by 75%  LTG Goal 6 Status:: New  Long Term Goal 7: Pt to have min to no point tenderness/pain in right iliococcygeous, left pubococcygeous, or B superficial transverse perineal pfm  LTG Goal 7 Status:: New      12/02/24 0912   OT Education   OT Education OT Role;Home Exercise Program;Plan of Care;Anatomy of condition   Barriers impacting rehab  None   Measures taken to address barrier(s) N/A   Current home exercise program (HEP) bladder diary, pelvic wand for self massage, ta contraction, focus on stretching and relaxation     TREATMENT PLAN   Plan  Plan Frequency: 2-3x/week  Plan Weeks: 12  Current Treatment Recommendations: Strengthening, Endurance training, Manual Therapy:  STM, Coordination training, Patient/Caregiver education & training  Additional Comments: biofeedback for down training     Electronically signed by ANN-MARIE LOPEZ/BART, at 2:02 PM on

## 2024-12-02 NOTE — PROGRESS NOTES
Nicki Rubio comes today for her follow-up breast exam.  She has had no new breast complaints.  She reports no new palpable masses.  There is no skin or nipple changes.  There is no nipple discharge.  She has no appreciable evidence of supraclavicular or axillary adenopathy.    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    Chronic heartburn 03/24/2022    Dyspepsia 03/24/2022    Nausea 03/24/2022    Esophageal pain 03/24/2022    Other fatigue 01/26/2022    Close exposure to COVID-19 virus 01/26/2022    Iron deficiency anemia secondary to inadequate dietary iron intake 08/29/2017    Breast tenderness in female 05/16/2017    Breast mass, left 05/16/2017    History of breast augmentation 05/16/2017    Chest pain     Hemorrhoids 11/20/2014    LLQ abdominal pain 11/28/2012    Chronic constipation 11/28/2012    Epigastric pressure 11/28/2012    Heartburn 11/28/2012    Indigestion 11/28/2012    Acid reflux 11/28/2012    Esophageal spasm 11/28/2012       Current Outpatient Medications   Medication Sig Dispense Refill    estradiol (CLIMARA) 0.05 MG/24HR PLACE 1 PATCH ONTO THE SKIN ONCE A WEEK 4 patch 3    levothyroxine (SYNTHROID) 112 MCG tablet TAKE ONE TABLET BY MOUTH ONCE A DAY 90 tablet 1    meloxicam (MOBIC) 7.5 MG tablet Take 1 tablet by mouth daily as needed for Pain 30 tablet 2    omeprazole (PRILOSEC) 40 MG delayed release capsule Take 1 capsule by mouth      methocarbamol (ROBAXIN-750) 750 MG tablet Take 1 tablet by mouth 3 times daily as needed (spasms) 90 tablet 2    progesterone (PROMETRIUM) 100 MG CAPS capsule Take 1 capsule by mouth daily 30 capsule 2    vitamin D  Quality 358: Patient-Centered Surgical Risk Assessment And Communication: Documentation of patient-specific risk assessment with a risk calculator based on multi-institutional clinical data, the specific risk calculator used, and communication of risk assessment from risk calculator with the patient or family. Detail Level: Detailed

## 2024-12-03 ENCOUNTER — HOSPITAL ENCOUNTER (OUTPATIENT)
Dept: OCCUPATIONAL THERAPY | Age: 59
Setting detail: THERAPIES SERIES
Discharge: HOME | End: 2024-12-03
Payer: COMMERCIAL

## 2024-12-03 ENCOUNTER — HOSPITAL ENCOUNTER (OUTPATIENT)
Dept: PHYSICAL THERAPY | Age: 59
Setting detail: THERAPIES SERIES
Discharge: HOME | End: 2024-12-03
Payer: COMMERCIAL

## 2024-12-03 PROCEDURE — G0283 ELEC STIM OTHER THAN WOUND: HCPCS

## 2024-12-03 PROCEDURE — 97530 THERAPEUTIC ACTIVITIES: CPT

## 2024-12-03 PROCEDURE — 97110 THERAPEUTIC EXERCISES: CPT

## 2024-12-03 ASSESSMENT — PAIN DESCRIPTION - PAIN TYPE
TYPE: CHRONIC PAIN
TYPE: CHRONIC PAIN

## 2024-12-03 ASSESSMENT — PAIN DESCRIPTION - LOCATION
LOCATION: BACK
LOCATION: BACK

## 2024-12-03 ASSESSMENT — PAIN DESCRIPTION - DESCRIPTORS
DESCRIPTORS: SORE
DESCRIPTORS: SORE

## 2024-12-03 ASSESSMENT — PAIN SCALES - GENERAL
PAINLEVEL_OUTOF10: 3
PAINLEVEL_OUTOF10: 3

## 2024-12-03 ASSESSMENT — PAIN DESCRIPTION - ORIENTATION
ORIENTATION: LOWER
ORIENTATION: LOWER

## 2024-12-03 NOTE — PROGRESS NOTES
UE/LE 10 reps x 1 set; improved ability to contract and sustain  OT Exercise 10: Supine transverse abdominis contraction with bridge 1 x 10  OT Exercise 11: Supine pelvic tilts 1 x 10--NOT TODAY  OT Exercise 12: Down training with biofeedback; pt able to achieve relaxation of 2/3 after 3-4 minutes of relaxation  OT Exercise 13: Sit to stand with ta contraction and exhale when initiating standing up 1 x 10  OT Exercise 14: Supine pfm contractions 10 seconds on and 10 seconds off x 5 reps; In supine hooklying alone, she was able to contract up to 8/10 and sustain 8 and relax down to 4/6/8  OT Exercise 15: Supine pfm contractions 10 seconds on and 10 seconds off x 5 reps x 2 sets; In supine hooklying with wedge for gravity assist, she was able to contract with bursts up to 10 and sustain 8/10 and relax down to 3/4/6      ASSESSMENT     Impression:    Assessment: Pt did well in session.  Her back is better than yesterday, but still not back to baseline so some stretches were avoided again this date.  She demonstrates improved ability to contract and sustain ta for core strengthening, and improved overal awareness of core musculature.  She participated in down training before and after pfm exercises, and demonstrates improved ability to relax pfm this date.  She performed pfm exercises in supine hooklying alone, but did better with both contraction and relaxation in wedged position.   Statement of Medical Necessity: Occupational Therapy is both indicated and medically necessary as outlined in the POC to increase the likelihood of meeting the functionally related goals stated below.   Patient's Activity Tolerance: Patient tolerated treatment well      Patient's rehabilitation potential/prognosis is considered to be: Good     GOALS     Patient Goal(s): Be able to exert without leaking, have intercourse without pain.    Short Term Goals Completed by 6 weeks   Time Frame for Short Term Goals: 6 weeks  Short Term Goal 1: Pt

## 2024-12-03 NOTE — PROGRESS NOTES
Plank, 10 sec hold, 2 reps  Exercise 9: Sitting, LAQ, 3#, 10 reps  Exercise 10: Sitting, sit to stand, mat to chair progression, 10 reps  Exercise 11: Standing, multifidus row, Paloff press, shoulder retraction, 10 reps  Exercise 15: IFC wth MH x 15 min  Exercise 16: ILT, 1/2 body wt, clinic protocols, as indicated.  40 pound 20 minutes-NOT TODAY SINCE SHE JUST HAD IT YESTERDAY, RESUME ON FRIDAY  Exercise 17: IASTM/STM to lumbar paraspinals in prone position for 5 mins-STM 12/3-                          Pt Education: Additional Comments: spoke with evaluating therapist Konstantin Castaneda PT and he was agreeable to increase frequency to 2-3x per week at patient's request.       ASSESSMENT     Assessment: Assessment: Patient did well in session.  She was able to complete ex but has to monitor as she works through.  She had some increased pain wtih DKTC and planks.  She reported pain at 3-4/10 pre and post session.  Applied estim with moist heat today vs traction as she just had traction yesterday.  Plan to try traction again on next visit.  Body Structures, Functions, Activity Limitations Requiring Skilled Therapeutic Intervention: Decreased functional mobility , Decreased ADL status, Decreased ROM, Decreased strength, Decreased endurance, Increased pain, Decreased high-level IADLs, Decreased balance    Post-Treatment Pain Level:      Activity Tolerance: Patient tolerated treatment well    Therapy Prognosis: Good       GOALS   Patient Goals : Less pain  Short Term Goals Completed by 3-4 weeks Current Status Goal Status   Patient will improve ADL as demonstrated by mprovement of Oswestry Disability Index from 16% impairment to 10% impairment.       Patient will perform HEP with min cuing.       Patient will demonstrate safe lifting posture with min pain with 12\" to knuckle lift using lifing crate.       Patient will increase prone plank time to 30 sec with less pain.       Patient will increase lumbar flexion by 5 degrees

## 2024-12-04 ENCOUNTER — OFFICE VISIT (OUTPATIENT)
Dept: SURGERY | Age: 59
End: 2024-12-04
Payer: COMMERCIAL

## 2024-12-04 ENCOUNTER — OFFICE VISIT (OUTPATIENT)
Dept: ENT CLINIC | Age: 59
End: 2024-12-04
Payer: COMMERCIAL

## 2024-12-04 VITALS — WEIGHT: 142 LBS | OXYGEN SATURATION: 98 % | BODY MASS INDEX: 22.29 KG/M2 | HEIGHT: 67 IN | HEART RATE: 77 BPM

## 2024-12-04 VITALS
HEIGHT: 67 IN | BODY MASS INDEX: 22.29 KG/M2 | WEIGHT: 142 LBS | SYSTOLIC BLOOD PRESSURE: 102 MMHG | DIASTOLIC BLOOD PRESSURE: 72 MMHG

## 2024-12-04 DIAGNOSIS — J34.89 NASAL PAIN: ICD-10-CM

## 2024-12-04 DIAGNOSIS — J34.2 DEVIATED SEPTUM: Primary | ICD-10-CM

## 2024-12-04 DIAGNOSIS — R92.0 MICROCALCIFICATION OF RIGHT BREAST ON MAMMOGRAM: Primary | ICD-10-CM

## 2024-12-04 PROCEDURE — 99213 OFFICE O/P EST LOW 20 MIN: CPT | Performed by: PHYSICIAN ASSISTANT

## 2024-12-04 RX ORDER — SODIUM CHLORIDE/ALOE VERA
GEL (GRAM) NASAL
Qty: 14 G | Refills: 5 | Status: SHIPPED | OUTPATIENT
Start: 2024-12-04

## 2024-12-04 RX ORDER — MUPIROCIN 20 MG/G
OINTMENT TOPICAL
Qty: 15 G | Refills: 2 | Status: SHIPPED | OUTPATIENT
Start: 2024-12-04 | End: 2024-12-11

## 2024-12-04 NOTE — PROGRESS NOTES
Nicki is a pleasant 59-year-old  female that presents for a follow-up after treatment for a tender left septal mass that was noted from previous nasopharyngoscopy.  Overall an abscess was not grossly appreciated however she was treated with 10 days of doxycycline as well as mupirocin ointment.  She reports that the area is  and has not changed in size.  Denies any issues with fever, chills, or nasal drainage.      Physical examination revealed the patient to have a persistent mass effect to the left anterior nasal septum that is tender with no obvious abscess appreciated to palpation.  Patient has a pre-existing deviated septum however this seems to be more prominent.  I did not appreciate any masses or any polyps to either nare.  Oral exam demonstrated no remarkable findings to the posterior pharynx with normal surface of tongue.  Neck exam demonstrated no lymphadenopathy or thyromegaly.      Impression: Persistent left nasal septal mass-questionable low-grade abscess versus very prominent deviated septum    Plan: I recommended a CT of the facial bones due to this not responding to antibiotic therapy as expected.  I will call her the results with further recommendations to follow.      Electronically signed by LENNIE WILSON PA-C on 12/4/24 at 1:26 PM CST

## 2024-12-05 ENCOUNTER — HOSPITAL ENCOUNTER (OUTPATIENT)
Dept: OCCUPATIONAL THERAPY | Age: 59
Setting detail: THERAPIES SERIES
Discharge: HOME | End: 2024-12-05
Payer: COMMERCIAL

## 2024-12-05 ENCOUNTER — HOSPITAL ENCOUNTER (OUTPATIENT)
Dept: PHYSICAL THERAPY | Age: 59
Setting detail: THERAPIES SERIES
Discharge: HOME | End: 2024-12-05
Payer: COMMERCIAL

## 2024-12-05 DIAGNOSIS — R92.8 ABNORMAL MAMMOGRAM: Primary | ICD-10-CM

## 2024-12-05 PROCEDURE — 97530 THERAPEUTIC ACTIVITIES: CPT

## 2024-12-05 PROCEDURE — 97012 MECHANICAL TRACTION THERAPY: CPT

## 2024-12-05 PROCEDURE — 97110 THERAPEUTIC EXERCISES: CPT

## 2024-12-05 RX ORDER — DIAZEPAM 5 MG/1
TABLET ORAL
Qty: 10 TABLET | Refills: 0 | Status: SHIPPED | OUTPATIENT
Start: 2024-12-05 | End: 2024-12-15

## 2024-12-05 ASSESSMENT — PAIN DESCRIPTION - LOCATION
LOCATION: BACK
LOCATION: BACK;BUTTOCKS

## 2024-12-05 ASSESSMENT — PAIN DESCRIPTION - ORIENTATION: ORIENTATION: LEFT

## 2024-12-05 ASSESSMENT — PAIN SCALES - GENERAL
PAINLEVEL_OUTOF10: 3
PAINLEVEL_OUTOF10: 3

## 2024-12-05 ASSESSMENT — PAIN DESCRIPTION - PAIN TYPE
TYPE: CHRONIC PAIN
TYPE: CHRONIC PAIN

## 2024-12-05 NOTE — PROGRESS NOTES
therefore no heel lift issued today)  Exercise 6: Supine, 1 JUANCARLOS 1 x 10, 2 JUANCARLOS 1 x 10- double knee to chest increases pain  Exercise 7: Supine, pelvic tilt, 15 reps , folded towel  Exercise 8: Supine, partial crunch, 10 reps;        Prone; Plank, 10 sec hold, 2 reps  Exercise 9: Sitting, LAQ, 4#, 10 reps  Exercise 10: Sitting, sit to stand, mat to chair progression, 10 reps  Exercise 11: Standing, multifidus row, Paloff press, shoulder retraction, 10 reps  Exercise 15: IFC wth MH x 15 min--not today  Exercise 16: ILT, 1/2 body wt, clinic protocols, as indicated.  55 pounds, 20 minutes  Exercise 17: IASTM/STM to lumbar paraspinals in prone position for 5 mins-STM 12/3- and 12/5  Exercise 18: SI belt issued on 12/5/24                          Pt Education: Additional Comments: spoke with evaluating therapist Konstantin Castaneda, PT and he was agreeable to increase frequency to 2-3x per week at patient's request.       ASSESSMENT     Assessment: Assessment: Ms. Rubio appeared for the most part to do well with today's therapy activities. She appears familiar with her exercises, some increased challenges added to her program with her appearing to accept them fairly well. She continues to have discomfort at the left lower lateral ribs, using bedsheet to provide some compression while doing some of her exercises supine exercises. Sidelying manual therapy performed again today to her rib area. Additionally, she again underwent pelvic traction with with weight pull increased from 40 # to 55#, complaining of some soreness at the conclusion of her session, but no real elevation in her pain level. She was fitted with an SI belt today as that was mentioned in her program but not yet issued. She reported lessened discomfort/pressure while wearing the belt. Continue with current course of therapy.  Body Structures, Functions, Activity Limitations Requiring Skilled Therapeutic Intervention: Decreased functional mobility , Decreased ADL

## 2024-12-05 NOTE — PROGRESS NOTES
Occupational Therapy: Daily Treatment Note    Patient: Nicki Rubio (59 y.o. female)   Examination Date:    Plan of Care Certification Period:  to Certification Period Expiration Date: 25   :  1965 MRN: [id number]  CSN: [id number]   Insurance: Payor: QUYNH GARCIA / Plan: [employer] [facility] [employer] / Product Type: *No Product type* /   Insurance ID: [id number] - ([facility]) Secondary Insurance (if applicable):    Referring Physician: Lauren Trujillo MD Melissa Purvis   PCP: Lauren Trujillo MD Visits to Date/Visits Approved:      Medical Diagnosis: Unspecified urinary incontinence [R32]    Treatment Diagnosis: Treatment Diagnosis: decreased pfm and core strength, endurance and coordination; pain with intercourse, external pfm sensitivity/pain    Prognosis: Good    Subjective  Subjective: I have been so stressed out but I did try to focus on relaxing     Treatment Activities     Exercises:   OT Exercise 1: Supine legs up wall/wall stretches  OT Exercise 2: B hamstring stretch 3 x 15 seconds; hip IR 3 x 15 seconds, hip 3 ER 3 x 15 seconds; Piraformis stretch 3 x 15 seconds < 90, > 90  OT Exercise 3: Happy baby, praying pose in prone on mat, forward bend seated on stool--NOT TODAY due to back pain  OT Exercise 4: External massage to B superficial transverse perineal--NOT TODAY; pt reports she has started performing at home  OT Exercise 5: Internal massage--NOT TODAY; pt reports she has ordered a wand for self massage  OT Exercise 6: Supine transverse abdominis contraction alone 10 x 10 seconds x 1 set; improved ability to contract and improved endurance  OT Exercise 7: Supine transverse abdominis contraction alternate UE 10 reps x 1 set; improved ability to contract and sustain  OT Exercise 8: Supine transverse abdominis contraction alternate LE 10 reps x 1 set; improved ability to contract and sustain  OT Exercise 9: Supine transverse abdominis contraction alternate UE/LE 10 reps x

## 2024-12-06 ENCOUNTER — HOSPITAL ENCOUNTER (OUTPATIENT)
Dept: WOMENS IMAGING | Age: 59
Discharge: HOME | End: 2024-12-06
Payer: COMMERCIAL

## 2024-12-06 DIAGNOSIS — R92.0 MICROCALCIFICATION OF RIGHT BREAST ON MAMMOGRAM: ICD-10-CM

## 2024-12-06 PROCEDURE — A4648 IMPLANTABLE TISSUE MARKER: HCPCS

## 2024-12-06 PROCEDURE — 77065 DX MAMMO INCL CAD UNI: CPT

## 2024-12-09 ENCOUNTER — OFFICE VISIT (OUTPATIENT)
Dept: INTERNAL MEDICINE | Age: 59
End: 2024-12-09
Payer: COMMERCIAL

## 2024-12-09 ENCOUNTER — HOSPITAL ENCOUNTER (OUTPATIENT)
Dept: PHYSICAL THERAPY | Age: 59
Setting detail: THERAPIES SERIES
Discharge: HOME | End: 2024-12-09
Payer: COMMERCIAL

## 2024-12-09 ENCOUNTER — APPOINTMENT (OUTPATIENT)
Dept: PHYSICAL THERAPY | Age: 59
End: 2024-12-09
Payer: COMMERCIAL

## 2024-12-09 ENCOUNTER — APPOINTMENT (OUTPATIENT)
Dept: OCCUPATIONAL THERAPY | Age: 59
End: 2024-12-09
Payer: COMMERCIAL

## 2024-12-09 ENCOUNTER — HOSPITAL ENCOUNTER (OUTPATIENT)
Dept: OCCUPATIONAL THERAPY | Age: 59
Setting detail: THERAPIES SERIES
Discharge: HOME | End: 2024-12-09
Payer: COMMERCIAL

## 2024-12-09 VITALS
HEART RATE: 82 BPM | WEIGHT: 141 LBS | OXYGEN SATURATION: 98 % | BODY MASS INDEX: 22.13 KG/M2 | DIASTOLIC BLOOD PRESSURE: 70 MMHG | SYSTOLIC BLOOD PRESSURE: 118 MMHG | HEIGHT: 67 IN

## 2024-12-09 DIAGNOSIS — K21.9 GASTROESOPHAGEAL REFLUX DISEASE WITHOUT ESOPHAGITIS: ICD-10-CM

## 2024-12-09 DIAGNOSIS — G89.29 CHRONIC LOW BACK PAIN, UNSPECIFIED BACK PAIN LATERALITY, UNSPECIFIED WHETHER SCIATICA PRESENT: ICD-10-CM

## 2024-12-09 DIAGNOSIS — E55.9 VITAMIN D DEFICIENCY: ICD-10-CM

## 2024-12-09 DIAGNOSIS — M54.50 CHRONIC LOW BACK PAIN, UNSPECIFIED BACK PAIN LATERALITY, UNSPECIFIED WHETHER SCIATICA PRESENT: ICD-10-CM

## 2024-12-09 DIAGNOSIS — E03.9 HYPOTHYROIDISM, UNSPECIFIED TYPE: ICD-10-CM

## 2024-12-09 DIAGNOSIS — Z00.00 ROUTINE HEALTH MAINTENANCE: Primary | ICD-10-CM

## 2024-12-09 DIAGNOSIS — N95.1 MENOPAUSAL SYMPTOMS: ICD-10-CM

## 2024-12-09 DIAGNOSIS — K58.9 IRRITABLE BOWEL SYNDROME, UNSPECIFIED TYPE: ICD-10-CM

## 2024-12-09 DIAGNOSIS — E34.9 HORMONE IMBALANCE: ICD-10-CM

## 2024-12-09 PROCEDURE — 97110 THERAPEUTIC EXERCISES: CPT

## 2024-12-09 PROCEDURE — 97530 THERAPEUTIC ACTIVITIES: CPT

## 2024-12-09 PROCEDURE — 99396 PREV VISIT EST AGE 40-64: CPT | Performed by: INTERNAL MEDICINE

## 2024-12-09 PROCEDURE — 97012 MECHANICAL TRACTION THERAPY: CPT

## 2024-12-09 RX ORDER — ESTRADIOL 0.5 MG/1
0.5 TABLET ORAL DAILY
Qty: 30 TABLET | Refills: 3 | Status: SHIPPED | OUTPATIENT
Start: 2024-12-09

## 2024-12-09 RX ORDER — OMEPRAZOLE 40 MG/1
40 CAPSULE, DELAYED RELEASE ORAL 2 TIMES DAILY
Qty: 180 CAPSULE | Refills: 1 | Status: SHIPPED | OUTPATIENT
Start: 2024-12-09 | End: 2025-12-10

## 2024-12-09 ASSESSMENT — PAIN SCALES - GENERAL
PAINLEVEL_OUTOF10: 3
PAINLEVEL_OUTOF10: 3

## 2024-12-09 ASSESSMENT — PAIN DESCRIPTION - LOCATION
LOCATION: BACK;BUTTOCKS
LOCATION: BACK

## 2024-12-09 ASSESSMENT — PAIN DESCRIPTION - DESCRIPTORS: DESCRIPTORS: SORE

## 2024-12-09 ASSESSMENT — PAIN DESCRIPTION - PAIN TYPE
TYPE: CHRONIC PAIN
TYPE: CHRONIC PAIN

## 2024-12-09 ASSESSMENT — PAIN DESCRIPTION - ORIENTATION: ORIENTATION: LEFT;RIGHT;LOWER

## 2024-12-09 NOTE — PROGRESS NOTES
crunch, 10 reps;        Prone; Plank, 10 sec hold, 2 reps-NOT TODAY  Exercise 9: Sitting, LAQ, 4#, 10 reps  Exercise 10: Sitting, sit to stand, mat to chair progression, 10 reps  Exercise 11: Standing, multifidus row, Paloff press, shoulder retraction, 10 reps- GREEN BAND  Exercise 15: IFC wth MH x 15 min--not today  Exercise 16: ILT, 1/2 body wt, clinic protocols, as indicated.  60 pounds, 13 minutes  Exercise 17: IASTM/STM to lumbar paraspinals in prone position for 5 mins-IASTM 12/9  Exercise 18: SI belt issued on 12/5/24                          Pt Education: Additional Comments: spoke with evaluating therapist Konstantin Castaneda, PT and he was agreeable to increase frequency to 2-3x per week at patient's request.       ASSESSMENT     Assessment: Assessment: Patient did fair in session.  She complained of increased back pain when trying to perform particular ex (dktc and pelvic tilts).  She completed what she could and opted for IPT today.  Increased to 60# at her request.  She rated pain at 3/10 pre and post session and up to 4-5/10 during ex.  Body Structures, Functions, Activity Limitations Requiring Skilled Therapeutic Intervention: Decreased functional mobility , Decreased ADL status, Decreased ROM, Decreased strength, Decreased endurance, Increased pain, Decreased high-level IADLs, Decreased balance    Post-Treatment Pain Level:      Activity Tolerance: Patient tolerated treatment well    Therapy Prognosis: Good       GOALS   Patient Goals : Less pain  Short Term Goals Completed by 3-4 weeks Current Status Goal Status   Patient will improve ADL as demonstrated by mprovement of Oswestry Disability Index from 16% impairment to 10% impairment.       Patient will perform HEP with min cuing.       Patient will demonstrate safe lifting posture with min pain with 12\" to knuckle lift using lifing crate.       Patient will increase prone plank time to 30 sec with less pain.       Patient will increase lumbar flexion by 5

## 2024-12-09 NOTE — PROGRESS NOTES
Chief Complaint   Patient presents with    Follow-up       HPI: Nicki Rubio is a 59 y.o. female is here for her annual physical exam.  Her functional status is good.  She denies any history of falls.  She has no concerns in regards to safety, hearing, or cognition.  She is currently seeing JOSE Garcia for MRSA infection in her nose.  She states that the CT of the facial bones has been ordered.  She also is seeing Dr. Pereira for history of a breast nodule.  She recently underwent a biopsy.  The pathology is pending.  She sees Dr. Mascorro for history of chronic low back pain.  She has been undergoing physical therapy, which has been helping.    She is having difficulty keeping the estradiol patch on.  She wants know she can try oral Estrace.  We can switch her to oral Estrace.  However, if her pathology of her breast lump comes back malignant, we will need to discontinue the Estrace altogether.    Her acid reflux is stable on Prilosec.  Her thyroid function is currently stable.  She does take Mobic as needed for back pain.  She is also taking Robaxin.  She feels like is less sedating than the baclofen.    She occasionally takes Levsin for IBS symptoms.  However, she has not used it recently.  She states that she has changed her diet.  Her reflux has improved.  She gave up fried foods and alcohol.  She is taking some over-the-counter Gaviscon and Prilosec as needed.    She does have a history of vitamin D deficiency.  She is taking her vitamin D supplement as prescribed.    Routine screening is as follows:  Eye exam yearly  Flu vaccine is up-to-date  Pap: Declined  Mammogram November 2024  Colonoscopy: 2021    Past Medical History:   Diagnosis Date    Acid reflux     Anemia     BRCA negative     Chronic back pain     GERD (gastroesophageal reflux disease)     Hypothyroidism     Irritable bowel syndrome     Premature atrial contraction     Skipped heart beats     Thyroid disease     Thyroid mass     Vitamin D

## 2024-12-09 NOTE — PROGRESS NOTES
potential/prognosis is considered to be: Good     GOALS     Patient Goal(s): Be able to exert without leaking, have intercourse without pain.    Short Term Goals Completed by 6 weeks   Time Frame for Short Term Goals: 6 weeks  Short Term Goal 1: Pt to have PERF score of 2/5/8/10  STG Goal 1 Status:: New  Short Term Goal 2: Pt to report decreased overall urinary leakage by 50%  STG Goal 2 Status:: New  Short Term Goal 3: Pt to report decreased overall stress urinary incontinence by 50%  STG Goal 3 Status:: New  Short Term Goal 4: Pt to be independent with HEP, with upgrades as appropriate  STG Goal 4 Status:: New  Short Term Goal 5: Pt to report decreased pain with intercourse by 50%  STG Goal 5 Status:: New       Long Term Goals Completed by 12 weeks   Long Term Goals  Time Frame for Long Term Goals : 12 weeks  Long Term Goal 1: Pt to have PERF score of 3/10/15/20  LTG Goal 1 Status:: New  Long Term Goal 2: Pt to report decreased overall urinary leakage by 75%  LTG Goal 2 Status:: New  Long Term Goal 3: Pt to report decreased overall stress urinary incontinence by 75%  LTG Goal 3 Status:: New  Long Term Goal 4: Pt to have PFIQ impairment score of </=4%  LTG Goal 4 Status:: New  Long Term Goal 5: Pt to be independent with comprehensive/finalized HEP  LTG Goal 5 Status:: New  Additional Goals?: Yes  Long Term Goal 6: Pt to report decreased pain with intercourse by 75%  LTG Goal 6 Status:: New  Long Term Goal 7: Pt to have min to no point tenderness/pain in right iliococcygeous, left pubococcygeous, or B superficial transverse perineal pfm  LTG Goal 7 Status:: New      12/09/24 0807   OT Education   OT Education OT Role;Home Exercise Program;Plan of Care;Anatomy of condition   Patient Education self massage of superficial transverse perineal, continue to focus on stretching and relaxation, perform pfm in wedged position for gravity assist or supine hooklying alone, consider a wedge for sleeping due to reflux   Barriers

## 2024-12-10 ENCOUNTER — HOSPITAL ENCOUNTER (OUTPATIENT)
Dept: OCCUPATIONAL THERAPY | Age: 59
Setting detail: THERAPIES SERIES
Discharge: HOME | End: 2024-12-10
Payer: COMMERCIAL

## 2024-12-10 ENCOUNTER — HOSPITAL ENCOUNTER (OUTPATIENT)
Dept: PHYSICAL THERAPY | Age: 59
Setting detail: THERAPIES SERIES
Discharge: HOME | End: 2024-12-10
Payer: COMMERCIAL

## 2024-12-10 ENCOUNTER — TELEPHONE (OUTPATIENT)
Dept: SURGERY | Age: 59
End: 2024-12-10

## 2024-12-10 DIAGNOSIS — R92.0 MICROCALCIFICATION OF RIGHT BREAST ON MAMMOGRAM: Primary | ICD-10-CM

## 2024-12-10 PROCEDURE — 97110 THERAPEUTIC EXERCISES: CPT

## 2024-12-10 PROCEDURE — 97530 THERAPEUTIC ACTIVITIES: CPT

## 2024-12-10 PROCEDURE — G0283 ELEC STIM OTHER THAN WOUND: HCPCS

## 2024-12-10 ASSESSMENT — PAIN DESCRIPTION - PAIN TYPE
TYPE: CHRONIC PAIN
TYPE: CHRONIC PAIN

## 2024-12-10 ASSESSMENT — PAIN DESCRIPTION - ORIENTATION: ORIENTATION: LEFT;RIGHT

## 2024-12-10 ASSESSMENT — PAIN DESCRIPTION - DESCRIPTORS: DESCRIPTORS: SORE

## 2024-12-10 ASSESSMENT — PAIN DESCRIPTION - LOCATION
LOCATION: BACK
LOCATION: BACK

## 2024-12-10 ASSESSMENT — PAIN SCALES - GENERAL
PAINLEVEL_OUTOF10: 4
PAINLEVEL_OUTOF10: 4

## 2024-12-10 NOTE — PROGRESS NOTES
Occupational Therapy: Daily Treatment Note    Patient: Nicki Rubio (59 y.o. female)   Examination Date:    Plan of Care Certification Period:  to Certification Period Expiration Date: 25   :  1965 MRN: [id number]  CSN: [id number]   Insurance: Payor: [facility] / Plan: [employer] [facility] [employer] / Product Type: *No Product type* /   Insurance ID: [id number] - ([facility]) Secondary Insurance (if applicable):    Referring Physician: Lauren Trujillo MD Melissa Purvis   PCP: Lauren Trujillo MD Visits to Date/Visits Approved:      Medical Diagnosis: Unspecified urinary incontinence [R32]    Treatment Diagnosis: Treatment Diagnosis: decreased pfm and core strength, endurance and coordination; pain with intercourse, external pfm sensitivity/pain    Prognosis: Good    Subjective  Subjective: My wand came in the mail yesterday but I haven't used it yet.     Treatment Activities     Exercises:   OT Exercise 1: Supine legs up wall/wall stretches  OT Exercise 2: B hamstring stretch 3 x 15 seconds; hip IR 3 x 15 seconds, hip 3 ER 3 x 15 seconds; Piraformis stretch 3 x 15 seconds < 90, > 90  OT Exercise 3: Happy baby, praying pose in prone on mat, forward bend seated on stool; no c/o increased back pain this date with these stretches  OT Exercise 4: External massage to B superficial transverse perineal--NOT TODAY; pt reports she has started performing at home; education provided re: ball or rolled up towel/pool noodle to sit on for self massage as well  OT Exercise 5: Internal massage--NOT TODAY; pt reports her wand was delivered yesterday; reviewed self massage with pelvic floor model  OT Exercise 6: Transverse abdominis contraction alone 10 x 10 seconds x 1 set; performed seated on red ball this date--UPGRADE  OT Exercise 7: Transverse abdominis contraction alternate UE 10 reps x 1 set; performed seated on red ball this date--UPGRADE  OT Exercise 8: Transverse abdominis contraction

## 2024-12-10 NOTE — PROGRESS NOTES
6-8 weeks Current Status Goal Status   Patient will improve ADL as demonstrated by mprovement of Oswestry Disability Index from 16% impairment to <10% impairment.       Patient will perform HEP with no cuing.       Patient will demonstrate safe lifting posture of 25# with no pain with 12\" to knuckle lift using lifing crate.       Patient will increase prone plank time to 60 sec with less pain.       Patient will increase lumbar flexion by 10 degrees with min pain increase.                                                    TREATMENT PLAN   PT Equipment Recommendations  Other: Issued SI belt at 12/5/24 session and explained how to properly don/doff.  Plan Frequency: 2-3 X weekly  Plan weeks: 6-8 weeks  Current Treatment Recommendations: Strengthening, ROM, Functional mobility training, ADL/Self-care training, IADL training, Manual, Neuromuscular re-education, Endurance training, Pain management, Home exercise program, Therapeutic activities, Modalities  Modalities: Heat/Cold, Mechanical Traction, Ultrasound, E-stim - unattended   Additional Comments: spoke with evaluating therapist Konstantin Castaneda PT and he was agreeable to increase frequency to 2-3x per week at patient's request.       Therapy Time  Individual Time In: 1000       Individual Time Out: 1100  Minutes: 60  Timed Code Treatment Minutes: 40 Minutes (estim x 20 mins)     Electronically signed by Marline Carvalho PTA  on 12/10/2024 at 11:24 AM   POC NOTE  Electronically signed by Marline Carvalho PTA on 12/10/2024 at 11:24 AM

## 2024-12-10 NOTE — PROGRESS NOTES
Pathology report given.  I will plan to see the patient back in 6 months with right breast mammography.    This has been electronically signed by Cuauhtemoc Molina PA-C.

## 2024-12-10 NOTE — TELEPHONE ENCOUNTER
Pathology report given.  Mammogram we will cancel appointment for January 2 and make her an appointment to see us back in 6 months with right breast mammography.

## 2024-12-11 ENCOUNTER — HOSPITAL ENCOUNTER (OUTPATIENT)
Dept: GENERAL RADIOLOGY | Age: 59
Discharge: HOME | End: 2024-12-11
Payer: COMMERCIAL

## 2024-12-11 ENCOUNTER — APPOINTMENT (OUTPATIENT)
Age: 59
End: 2024-12-11
Attending: INTERNAL MEDICINE
Payer: COMMERCIAL

## 2024-12-11 DIAGNOSIS — J34.89 NASAL PAIN: ICD-10-CM

## 2024-12-11 DIAGNOSIS — J34.2 DEVIATED SEPTUM: ICD-10-CM

## 2024-12-11 PROCEDURE — 70486 CT MAXILLOFACIAL W/O DYE: CPT

## 2024-12-11 SDOH — ECONOMIC STABILITY: FOOD INSECURITY: WITHIN THE PAST 12 MONTHS, THE FOOD YOU BOUGHT JUST DIDN'T LAST AND YOU DIDN'T HAVE MONEY TO GET MORE.: NEVER TRUE

## 2024-12-11 SDOH — ECONOMIC STABILITY: FOOD INSECURITY: WITHIN THE PAST 12 MONTHS, YOU WORRIED THAT YOUR FOOD WOULD RUN OUT BEFORE YOU GOT MONEY TO BUY MORE.: NEVER TRUE

## 2024-12-11 SDOH — ECONOMIC STABILITY: INCOME INSECURITY: HOW HARD IS IT FOR YOU TO PAY FOR THE VERY BASICS LIKE FOOD, HOUSING, MEDICAL CARE, AND HEATING?: NOT HARD AT ALL

## 2024-12-11 ASSESSMENT — PATIENT HEALTH QUESTIONNAIRE - PHQ9
3. TROUBLE FALLING OR STAYING ASLEEP: NOT AT ALL
7. TROUBLE CONCENTRATING ON THINGS, SUCH AS READING THE NEWSPAPER OR WATCHING TELEVISION: NOT AT ALL
2. FEELING DOWN, DEPRESSED OR HOPELESS: NOT AT ALL
SUM OF ALL RESPONSES TO PHQ QUESTIONS 1-9: 0
SUM OF ALL RESPONSES TO PHQ QUESTIONS 1-9: 0
5. POOR APPETITE OR OVEREATING: NOT AT ALL
SUM OF ALL RESPONSES TO PHQ QUESTIONS 1-9: 0
4. FEELING TIRED OR HAVING LITTLE ENERGY: NOT AT ALL
10. IF YOU CHECKED OFF ANY PROBLEMS, HOW DIFFICULT HAVE THESE PROBLEMS MADE IT FOR YOU TO DO YOUR WORK, TAKE CARE OF THINGS AT HOME, OR GET ALONG WITH OTHER PEOPLE: NOT DIFFICULT AT ALL
1. LITTLE INTEREST OR PLEASURE IN DOING THINGS: NOT AT ALL
9. THOUGHTS THAT YOU WOULD BE BETTER OFF DEAD, OR OF HURTING YOURSELF: NOT AT ALL
SUM OF ALL RESPONSES TO PHQ9 QUESTIONS 1 & 2: 0
8. MOVING OR SPEAKING SO SLOWLY THAT OTHER PEOPLE COULD HAVE NOTICED. OR THE OPPOSITE, BEING SO FIGETY OR RESTLESS THAT YOU HAVE BEEN MOVING AROUND A LOT MORE THAN USUAL: NOT AT ALL
6. FEELING BAD ABOUT YOURSELF - OR THAT YOU ARE A FAILURE OR HAVE LET YOURSELF OR YOUR FAMILY DOWN: NOT AT ALL
SUM OF ALL RESPONSES TO PHQ QUESTIONS 1-9: 0

## 2024-12-11 ASSESSMENT — ENCOUNTER SYMPTOMS
RECTAL PAIN: 0
EYE PAIN: 0
TROUBLE SWALLOWING: 0
RHINORRHEA: 0
EYE ITCHING: 0
NAUSEA: 0
ABDOMINAL PAIN: 1
BLOOD IN STOOL: 0
WHEEZING: 0
COLOR CHANGE: 0
COUGH: 0
VOMITING: 0
EYE DISCHARGE: 0
SINUS PRESSURE: 0
CHOKING: 0
CONSTIPATION: 0
EYE REDNESS: 0
ABDOMINAL DISTENTION: 0
VOICE CHANGE: 0
SINUS PAIN: 0
SORE THROAT: 0
SHORTNESS OF BREATH: 0
CHEST TIGHTNESS: 0
FACIAL SWELLING: 0
BACK PAIN: 1
ANAL BLEEDING: 0
PHOTOPHOBIA: 0
DIARRHEA: 0

## 2024-12-12 ENCOUNTER — HOSPITAL ENCOUNTER (OUTPATIENT)
Dept: PHYSICAL THERAPY | Age: 59
Setting detail: THERAPIES SERIES
Discharge: HOME | End: 2024-12-12
Payer: COMMERCIAL

## 2024-12-12 ENCOUNTER — HOSPITAL ENCOUNTER (OUTPATIENT)
Dept: OCCUPATIONAL THERAPY | Age: 59
Setting detail: THERAPIES SERIES
Discharge: HOME | End: 2024-12-12
Payer: COMMERCIAL

## 2024-12-12 PROCEDURE — 97530 THERAPEUTIC ACTIVITIES: CPT

## 2024-12-12 PROCEDURE — 97012 MECHANICAL TRACTION THERAPY: CPT

## 2024-12-12 PROCEDURE — 97110 THERAPEUTIC EXERCISES: CPT

## 2024-12-12 ASSESSMENT — PAIN SCALES - GENERAL
PAINLEVEL_OUTOF10: 4
PAINLEVEL_OUTOF10: 4

## 2024-12-12 ASSESSMENT — PAIN DESCRIPTION - LOCATION
LOCATION: BACK;HIP
LOCATION: BACK

## 2024-12-12 ASSESSMENT — PAIN DESCRIPTION - PAIN TYPE: TYPE: CHRONIC PAIN

## 2024-12-12 ASSESSMENT — PAIN DESCRIPTION - ORIENTATION: ORIENTATION: LEFT

## 2024-12-12 NOTE — PROGRESS NOTES
Occupational Therapy: Daily Treatment Note    Patient: Nicki Rubio (59 y.o. female)   Examination Date:    Plan of Care Certification Period:  to Certification Period Expiration Date: 25   :  1965 MRN: [id number]  CSN: [id number]   Insurance: Payor: [facility] / Plan: [facility] [employer]ES [employer] / Product Type: *No Product type* /   Insurance ID: [id number] - ([facility]) Secondary Insurance (if applicable):    Referring Physician: Lauren Trujillo MD Melissa Purvis   PCP: Lauren Trujillo MD Visits to Date/Visits Approved:      Medical Diagnosis: Unspecified urinary incontinence [R32]    Treatment Diagnosis: Treatment Diagnosis: decreased pfm and core strength, endurance and coordination; pain with intercourse, external pfm sensitivity/pain    Prognosis: Good    Subjective  Subjective: I am definitely seeing some improvements.  There have been several times when I have woke up and thought \"I could go pee, but I'm not going to, and I can go back to sleep without getting up.  And the same thing throughout the day, I can wait longer.\"     Treatment Activities     Exercises:   OT Exercise 1: Supine legs up wall/wall stretches  OT Exercise 2: B hamstring stretch 3 x 15 seconds; hip IR 3 x 15 seconds, hip 3 ER 3 x 15 seconds; Piraformis stretch 3 x 15 seconds < 90, > 90  OT Exercise 3: Happy baby, praying pose in prone on mat, forward bend seated on stool; no c/o increased back pain this date with these stretches  OT Exercise 4: External massage to B superficial transverse perineal--NOT TODAY; pt reports she has started performing at home; education provided re: ball or rolled up towel/pool noodle to sit on for self massage as well  OT Exercise 5: Internal massage--NOT TODAY; pt reports her wand was delivered yesterday; reviewed self massage with pelvic floor model  OT Exercise 6: Transverse abdominis contraction alone 10 x 10 seconds x 1 set; performed seated on green ball this

## 2024-12-12 NOTE — PROGRESS NOTES
Physical Therapy: Daily Note   Patient: Nicki Rubio (59 y.o. female)   Examination Date: 2024  Plan of Care/Certification Expiration Date: 24    No data recorded   :  1965 # of Visits since SOC:   9   MRN: [id number]  CSN: [id number] Start of Care Date:   2024   Insurance: Payor: [facility] / Plan: [facility] EMPLOYEES [facility] [employer] [facility] / Product Type: *No Product type* /   Insurance ID: [id number] - ([facility]) Secondary Insurance (if applicable):    Referring Physician: Lauren Trujillo MD Melissa Purvis   PCP: Lauren Trujillo MD Visits to Date/Visits Approved:     No Show/Cancelled Appts:   /       Medical Diagnosis: Unspecified urinary incontinence [R32] Low back pain  Treatment Diagnosis: Lumbar facet arthropathy, SIJ dysfunction, Costochondritis with associated pain and dysfunction        SUBJECTIVE EXAMINATION   Pain Level: Pain Screening  Pain Assessment: 0-10  Pain Level: 4  Pain Location: Back, Hip  Pain Orientation: Left    Patient Comments: Subjective: She has left low back pain, constant.  She relates aching.  Pain is worse with movement.  She has trouble sleeping at times.  She feels she is moving better and has less pain with deep breathing. Current pain rating 4/10.    HEP Compliance: Good        OBJECTIVE EXAMINATION   Restrictions:  Restrictions/Precautions: None   No data recorded No data recorded              TREATMENT     Exercises:      Treatment Reasoning    Exercise 3: Supine, lower trunk rotation, 10 reps, ball between knees  Exercise 4: Supine, bridges, 10 reps wiht sheet assist for rib stabilization-no sheet  Exercise 8: Supine, partial crunch, 10 reps;        Prone; Plank, 10 sec hold, 3 reps  Exercise 9: Sitting, LAQ, 6#, 2 sets, 10 reps; Leg curl, 10 reps  Exercise 16: ILT, 1/2 body wt, clinic protocols, as indicated.  60 pounds, 13 minutes-  Exercise 18: SI belt issued on 24  Exercise 19: Standing, ballon tap for pickle ball simulation, 1-3

## 2024-12-16 ENCOUNTER — HOSPITAL ENCOUNTER (OUTPATIENT)
Dept: OCCUPATIONAL THERAPY | Age: 59
Setting detail: THERAPIES SERIES
Discharge: HOME | End: 2024-12-16
Payer: COMMERCIAL

## 2024-12-16 ENCOUNTER — HOSPITAL ENCOUNTER (OUTPATIENT)
Dept: PHYSICAL THERAPY | Age: 59
Setting detail: THERAPIES SERIES
Discharge: HOME | End: 2024-12-16
Payer: COMMERCIAL

## 2024-12-16 PROCEDURE — 97110 THERAPEUTIC EXERCISES: CPT

## 2024-12-16 PROCEDURE — 97012 MECHANICAL TRACTION THERAPY: CPT

## 2024-12-16 PROCEDURE — 97530 THERAPEUTIC ACTIVITIES: CPT

## 2024-12-16 ASSESSMENT — PAIN DESCRIPTION - PAIN TYPE: TYPE: CHRONIC PAIN

## 2024-12-16 ASSESSMENT — PAIN DESCRIPTION - ORIENTATION: ORIENTATION: LEFT

## 2024-12-16 ASSESSMENT — PAIN DESCRIPTION - DESCRIPTORS: DESCRIPTORS: ACHING;SHARP

## 2024-12-16 ASSESSMENT — PAIN DESCRIPTION - LOCATION
LOCATION: CHEST;BACK;RIB CAGE
LOCATION: BACK

## 2024-12-16 ASSESSMENT — PAIN SCALES - GENERAL
PAINLEVEL_OUTOF10: 2
PAINLEVEL_OUTOF10: 3

## 2024-12-16 NOTE — PROGRESS NOTES
Physical Therapy: Daily Note/Reassessment   Patient: Nicki Rubio (59 y.o. female)   Examination Date: 2024  Plan of Care/Certification Expiration Date: 24    No data recorded   :  1965 # of Visits since SOC:   10   MRN: [id number]  CSN: [id number] Start of Care Date:   2024   Insurance: Payor: [facility] / Plan: [facility] EMPLOYEES [facility] / Product Type: *No Product type* /   Insurance ID: [id number] - ([facility]) Secondary Insurance (if applicable):    Referring Physician: Lauren Trujillo MD Melissa Purvis   PCP: Lauren Trujillo MD Visits to Date/Visits Approved: 10 / 12    No Show/Cancelled Appts:   /       Medical Diagnosis: Unspecified urinary incontinence [R32] Low back pain  Treatment Diagnosis: Lumbar facet arthropathy, SIJ dysfunction, Costochondritis with associated pain and dysfunction        SUBJECTIVE EXAMINATION   Pain Level: Pain Screening  Patient Currently in Pain: Yes  Pain Assessment: 0-10  Pain Level: 2  Best Pain Level: 0  Worst Pain Level: 8  Pain Type: Chronic pain  Pain Location: Chest, Back, Rib cage  Pain Orientation: Left  Pain Descriptors: Aching, Sharp    Patient Comments: Subjective: She relates increased left rib cage pain after last PT session.  Maybe from doing prone on elbows (or up dog).  She also relates increased pain due to increased activity over weekend.    HEP Compliance: Good        OBJECTIVE EXAMINATION   Restrictions:  Restrictions/Precautions: None   No data recorded No data recorded          Lumbar Assessment     AROM Lumbar Spine   Flexion: 10  Extension: 15  Lateral Flexion Right: 13  Lateral Flexion Left: 20           TREATMENT     Exercises:      Treatment Reasoning    Exercise 3: Supine, lower trunk rotation, 10 reps, ball between knees  Exercise 4: Supine, bridges, 10 reps wiht sheet assist for rib stabilization-no sheet  Exercise 8: Supine, partial crunch, 10 reps;        Prone; Plank, 10 sec hold, 3 reps  Exercise 9:

## 2024-12-16 NOTE — PROGRESS NOTES
Occupational Therapy: Reassessment   Patient: Nicki Rubio (59 y.o. female)   Examination Date:    Plan of Care Certification Period:  to Certification Period Expiration Date: 25   :  1965 MRN: [id number]  CSN: [id number]   Insurance: Payor: [facility] GARCIA / Plan: [employer] EM[employer]ES [employer] / Product Type: *No Product type* /   Insurance ID: [id number] - ([facility]) Secondary Insurance (if applicable):    Referring Physician: Lauren Trujillo MD Melissa Purvis   PCP: Lauren Trujillo MD Visits to Date/Visits Approved: 10 / 30     Medical Diagnosis: Unspecified urinary incontinence [R32]    Treatment Diagnosis: Treatment Diagnosis: decreased pfm and core strength, endurance and coordination; pain with intercourse, external pfm sensitivity/pain    Subjective  Subjective: I can sleep through the night pretty consistently now.     Treatment Activities     Exercises:   OT Exercise 1: Supine legs up wall/wall stretches--not tolerated this date due to back pain so a wedge was used instead  OT Exercise 2: B hamstring stretch 3 x 15 seconds; hip IR 3 x 15 seconds, hip 3 ER 3 x 15 seconds; Piraformis stretch 3 x 15 seconds < 90, > 90  OT Exercise 3: Happy baby, praying pose in prone on mat, forward bend seated on stool  OT Exercise 4: External massage to B superficial transverse perineal--no pain this date  OT Exercise 5: Internal massage--pt reports no pain this date throughout internal exam  OT Exercise 6: Transverse abdominis contraction alone 10 x 10 seconds x 1 set; performed in supine this date due to back pain  OT Exercise 7: Transverse abdominis contraction alternate UE 15 reps x 1 set; performed in supine this date due to back pain  OT Exercise 8: Transverse abdominis contraction alternate LE 15 reps x 1 set; performed in supine this date due to back pain  OT Exercise 9: Supine transverse abdominis contraction alternate UE/LE 15 reps x 1 set; performed in supine this date due to back pain

## 2024-12-18 ENCOUNTER — TELEPHONE (OUTPATIENT)
Dept: ENT CLINIC | Age: 59
End: 2024-12-18

## 2024-12-18 NOTE — TELEPHONE ENCOUNTER
I called and left a message on the patient's cell phone voicemail that the CT of the facial bones was reviewed.  This demonstrated no sinus disease with a significant deviated septum to the left side that was felt to represent the abnormality that was being seen on physical exam.  I did not appreciate a septal abscess or any additional problems.  Based on the CT she would qualify for a possible septoplasty with turbinate reduction.  She was advised to call if she is interested in having this procedure performed.  She was also advised to call if she had any questions or problems.      Electronically signed by LENNIE WILSON PA-C on 12/18/24 at 5:50 PM CST     No

## 2024-12-30 ENCOUNTER — APPOINTMENT (OUTPATIENT)
Dept: PHYSICAL THERAPY | Age: 59
End: 2024-12-30
Payer: COMMERCIAL

## 2024-12-30 ENCOUNTER — APPOINTMENT (OUTPATIENT)
Dept: OCCUPATIONAL THERAPY | Age: 59
End: 2024-12-30
Payer: COMMERCIAL

## 2024-12-31 ENCOUNTER — HOSPITAL ENCOUNTER (OUTPATIENT)
Age: 59
Discharge: HOME | End: 2025-01-02
Attending: INTERNAL MEDICINE
Payer: COMMERCIAL

## 2024-12-31 DIAGNOSIS — R07.89 CHEST DISCOMFORT: ICD-10-CM

## 2024-12-31 LAB
STRESS BASELINE DIAS BP: 80 MMHG
STRESS BASELINE HR: 71 BPM
STRESS BASELINE ST DEPRESSION: 0 MM
STRESS BASELINE SYS BP: 128 MMHG
STRESS ESTIMATED WORKLOAD: 7 METS
STRESS EXERCISE DUR MIN: 4 MIN
STRESS EXERCISE DUR SEC: 30 SEC
STRESS O2 SAT PEAK: 98 %
STRESS O2 SAT REST: 98 %
STRESS PEAK DIAS BP: 73 MMHG
STRESS PEAK SYS BP: 160 MMHG
STRESS PERCENT HR ACHIEVED: 94 %
STRESS POST PEAK HR: 153 BPM
STRESS RATE PRESSURE PRODUCT: NORMAL BPM*MMHG
STRESS ST DEPRESSION: 0 MM
STRESS STAGE 1 BP: NORMAL MMHG
STRESS STAGE 1 DURATION: 3 MIN:SEC
STRESS STAGE 1 HR: 142 BPM
STRESS STAGE 2 DURATION: NORMAL MIN:SEC
STRESS STAGE 2 HR: 151 BPM
STRESS STAGE RECOVERY 1 BP: NORMAL MMHG
STRESS STAGE RECOVERY 1 DURATION: NORMAL MIN:SEC
STRESS STAGE RECOVERY 1 HR: 83 BPM
STRESS TARGET HR: 162 BPM

## 2024-12-31 PROCEDURE — 93018 CV STRESS TEST I&R ONLY: CPT | Performed by: INTERNAL MEDICINE

## 2024-12-31 PROCEDURE — 93017 CV STRESS TEST TRACING ONLY: CPT

## 2024-12-31 PROCEDURE — 93016 CV STRESS TEST SUPVJ ONLY: CPT | Performed by: INTERNAL MEDICINE

## 2025-01-06 RX ORDER — ESTRADIOL 0.05 MG/D
1 PATCH TRANSDERMAL WEEKLY
Qty: 4 PATCH | Refills: 3 | Status: SHIPPED | OUTPATIENT
Start: 2025-01-06

## 2025-01-30 DIAGNOSIS — E34.9 HORMONAL DISORDER: ICD-10-CM

## 2025-01-31 RX ORDER — PROGESTERONE 100 MG/1
100 CAPSULE ORAL DAILY
Qty: 30 CAPSULE | Refills: 2 | Status: SHIPPED | OUTPATIENT
Start: 2025-01-31

## 2025-01-31 NOTE — TELEPHONE ENCOUNTER
Last OV 12/9/2024  Next OV 5/12/2025      Requested Prescriptions     Pending Prescriptions Disp Refills    progesterone (PROMETRIUM) 100 MG CAPS capsule [Pharmacy Med Name: PROGESTERONE 100MG CAPS] 30 capsule 2     Sig: TAKE ONE CAPSULE BY MOUTH ONCE A DAY

## 2025-02-07 ENCOUNTER — PATIENT MESSAGE (OUTPATIENT)
Dept: INTERNAL MEDICINE | Age: 60
End: 2025-02-07

## 2025-02-07 DIAGNOSIS — E34.9 HORMONAL DISORDER: ICD-10-CM

## 2025-02-07 RX ORDER — ESTRADIOL 0.05 MG/D
1 PATCH TRANSDERMAL WEEKLY
Qty: 4 PATCH | Refills: 3 | Status: SHIPPED | OUTPATIENT
Start: 2025-02-07

## 2025-02-14 ENCOUNTER — OFFICE VISIT (OUTPATIENT)
Dept: GASTROENTEROLOGY | Age: 60
End: 2025-02-14
Payer: COMMERCIAL

## 2025-02-14 VITALS
DIASTOLIC BLOOD PRESSURE: 80 MMHG | HEART RATE: 81 BPM | BODY MASS INDEX: 21.5 KG/M2 | OXYGEN SATURATION: 99 % | WEIGHT: 137 LBS | SYSTOLIC BLOOD PRESSURE: 120 MMHG | HEIGHT: 67 IN

## 2025-02-14 DIAGNOSIS — R10.12 LUQ ABDOMINAL PAIN: Primary | ICD-10-CM

## 2025-02-14 DIAGNOSIS — K21.9 GASTROESOPHAGEAL REFLUX DISEASE, UNSPECIFIED WHETHER ESOPHAGITIS PRESENT: ICD-10-CM

## 2025-02-14 PROCEDURE — 99214 OFFICE O/P EST MOD 30 MIN: CPT | Performed by: NURSE PRACTITIONER

## 2025-02-14 RX ORDER — BUSPIRONE HYDROCHLORIDE 5 MG/1
5 TABLET ORAL 2 TIMES DAILY
COMMUNITY

## 2025-02-14 RX ORDER — HYOSCYAMINE SULFATE 0.12 MG/1
0.12 TABLET SUBLINGUAL EVERY 4 HOURS PRN
Qty: 90 TABLET | Refills: 3 | Status: SHIPPED | OUTPATIENT
Start: 2025-02-14

## 2025-02-14 NOTE — PROGRESS NOTES
Subjective:     Patient ID: Nicki Rubio is a 59 y.o. female  PCP: Lauren Trujillo MD  Referring Provider: No ref. provider found    HPI  Patient presents to the office today with the following complaints: Irritable Bowel Syndrome      Patient seen in the office today with complaints of abd pain and GERD  She is taking omeprazole 40 mg BID she states most of the time this does control her acid reflux  States her heartburn started 27 years ago   Her heartburn did increased about 6 years ago, she started drinking alcohol regularly     Her biggest concern is LUQ abd pain.  She can not pinpoint a cause for this pain other than almost every time she drinks alcohol she will have the pain   But states there are times she is able to drink alcohol and not have the pain  She also states that fried fatty foods can cause the pain but again not every time she has these foods  States she can also feel the pain in her LUQ with certain movements such as twisting movements  Reports in the past she would take Levsin and that would alleviate the pain  States she has experienced this LUQ pain that comes and goes for a few years now   Denies nausea or vomiting  She has had a cholecystectomy   Past colonoscopies, endoscopies, and imaging have not been able to find a cause for this pain   Reports she has tried several dietary changes in the past that have not really helped to eliminate the pain     Denies any new issues with her bowels       Assessment:     1. LUQ abdominal pain  -     MRI ABDOMEN W WO CONTRAST MRCP; Future  2. Gastroesophageal reflux disease, unspecified whether esophagitis present  -     MRI ABDOMEN W WO CONTRAST MRCP; Future       Review of Systems   Constitutional:  Negative for activity change, appetite change, fatigue, fever and unexpected weight change.   HENT:  Negative for trouble swallowing.    Respiratory:  Negative for cough, choking and shortness of breath.    Cardiovascular:  Negative for chest pain.

## 2025-02-21 ENCOUNTER — HOSPITAL ENCOUNTER (OUTPATIENT)
Dept: MRI IMAGING | Age: 60
Discharge: HOME | End: 2025-02-21
Payer: COMMERCIAL

## 2025-02-21 DIAGNOSIS — R10.12 LUQ ABDOMINAL PAIN: ICD-10-CM

## 2025-02-21 DIAGNOSIS — K21.9 GASTROESOPHAGEAL REFLUX DISEASE, UNSPECIFIED WHETHER ESOPHAGITIS PRESENT: ICD-10-CM

## 2025-02-21 PROCEDURE — 6360000004 HC RX CONTRAST MEDICATION: Performed by: NURSE PRACTITIONER

## 2025-02-21 PROCEDURE — 74183 MRI ABD W/O CNTR FLWD CNTR: CPT

## 2025-02-21 PROCEDURE — A9577 INJ MULTIHANCE: HCPCS | Performed by: NURSE PRACTITIONER

## 2025-02-21 RX ADMIN — GADOBENATE DIMEGLUMINE 13 ML: 529 INJECTION, SOLUTION INTRAVENOUS at 09:03

## 2025-02-21 ASSESSMENT — ENCOUNTER SYMPTOMS
BLOOD IN STOOL: 0
SHORTNESS OF BREATH: 0
DIARRHEA: 0
TROUBLE SWALLOWING: 0
ABDOMINAL PAIN: 1
COUGH: 0
RECTAL PAIN: 0
NAUSEA: 0
VOMITING: 0
CONSTIPATION: 0
CHOKING: 0
ANAL BLEEDING: 0
ABDOMINAL DISTENTION: 0

## 2025-02-21 NOTE — RESULT ENCOUNTER NOTE
MRCP Showed normal liver  Spleen is not enlarged  Pancreas is normal    showed no acute inflammatory findings  No common bile or biliary duct dilation   Small simple cyst noted right kidney

## 2025-04-03 ENCOUNTER — OFFICE VISIT (OUTPATIENT)
Dept: GASTROENTEROLOGY | Age: 60
End: 2025-04-03
Payer: COMMERCIAL

## 2025-04-03 VITALS
DIASTOLIC BLOOD PRESSURE: 80 MMHG | OXYGEN SATURATION: 98 % | BODY MASS INDEX: 21.82 KG/M2 | HEART RATE: 68 BPM | WEIGHT: 139 LBS | HEIGHT: 67 IN | SYSTOLIC BLOOD PRESSURE: 120 MMHG

## 2025-04-03 DIAGNOSIS — R10.12 LUQ ABDOMINAL PAIN: Primary | ICD-10-CM

## 2025-04-03 DIAGNOSIS — K21.9 GASTROESOPHAGEAL REFLUX DISEASE, UNSPECIFIED WHETHER ESOPHAGITIS PRESENT: ICD-10-CM

## 2025-04-03 PROCEDURE — 99214 OFFICE O/P EST MOD 30 MIN: CPT | Performed by: NURSE PRACTITIONER

## 2025-04-03 RX ORDER — RABEPRAZOLE SODIUM 20 MG/1
20 TABLET, DELAYED RELEASE ORAL 2 TIMES DAILY
Qty: 60 TABLET | Refills: 1 | Status: SHIPPED | OUTPATIENT
Start: 2025-04-03

## 2025-04-03 ASSESSMENT — ENCOUNTER SYMPTOMS
BLOOD IN STOOL: 0
DIARRHEA: 0
CONSTIPATION: 0
CHOKING: 0
VOMITING: 0
ANAL BLEEDING: 0
ABDOMINAL PAIN: 0
RECTAL PAIN: 0
TROUBLE SWALLOWING: 0
COUGH: 0
NAUSEA: 0
ABDOMINAL DISTENTION: 0
SHORTNESS OF BREATH: 0

## 2025-04-03 NOTE — PROGRESS NOTES
Subjective:     Patient ID: Nicki Rubio is a 59 y.o. female  PCP: Lauren Trujillo MD  Referring Provider: No ref. provider found    HPI  History of Present Illness  The patient presents for evaluation of acid reflux.    She reports an improvement in her condition over the past 4 weeks, attributing this to dietary modifications. She has transitioned from coffee to green tea, which she believes has been beneficial. However, she experienced belching and a squeezing sensation in the epigastric area radiating up into her chest after consuming coffee yesterday and this morning. She has been researching functional gastric issues and notes that even water can sometimes trigger heartburn. She has attempted to alleviate symptoms by sleeping with an extra pillow but is uncertain of its efficacy. She has observed that abstaining from alcohol, coffee, diet drinks, and red sauces improves her condition and sleep quality. She has tried and failed many medications for acid reflux including pantoprazole, nexium, prevacid and she is currently taking omeprazole which was effective until recently. She has not tried Aciphex. She continues to take omeprazole twice daily, which provides some relief. She has found MiraLAX helpful in emptying her colon but still experiences bouts of diarrhea. She also reports alternating constipation and diarrhea. A recent scan showed no abnormalities. She has not used the prescribed Levsin.  MRI Result (most recent):  MRI ABDOMEN W WO CONTRAST MRCP 02/21/2025    Narrative  EXAM:  MRI ABDOMEN AND MRCP WITH AND WITHOUT CONTRAST    TECHNIQUE: Multiplanar multisequence MRI of the abdomen before and after administration of IV contrast.  Thin sliced radial 3-D MRCP was performed.    HISTORY: Abdominal pain, esophagitis    COMPARISON: None    FINDINGS:    Lung bases:  Clear.    Liver: Normal morphology.  No suspicious hepatic lesions.    Gallbladder: There has been cholecystectomy.    Bile ducts: No intra

## 2025-04-21 ENCOUNTER — TELEPHONE (OUTPATIENT)
Dept: INTERNAL MEDICINE | Age: 60
End: 2025-04-21

## 2025-04-21 DIAGNOSIS — B37.9 YEAST INFECTION: Primary | ICD-10-CM

## 2025-04-21 RX ORDER — FLUCONAZOLE 100 MG/1
100 TABLET ORAL DAILY
Qty: 3 TABLET | Refills: 0 | Status: SHIPPED | OUTPATIENT
Start: 2025-04-21 | End: 2025-04-24

## 2025-04-21 RX ORDER — NYSTATIN 100000 U/G
CREAM TOPICAL
Qty: 30 G | Refills: 1 | Status: SHIPPED | OUTPATIENT
Start: 2025-04-21

## 2025-04-21 NOTE — TELEPHONE ENCOUNTER
Patient states that she has yeast infection that started around anus. Now around her vaginal opening. She is requesting something to be sent in and a cream to [facility]. Please advise.

## 2025-05-09 DIAGNOSIS — Z00.00 ROUTINE HEALTH MAINTENANCE: ICD-10-CM

## 2025-05-09 DIAGNOSIS — E34.9 HORMONE IMBALANCE: ICD-10-CM

## 2025-05-09 LAB
25(OH)D3 SERPL-MCNC: 36.4 NG/ML
ALBUMIN SERPL-MCNC: 4.2 G/DL (ref 3.5–5.2)
ALP SERPL-CCNC: 76 U/L (ref 35–104)
ALT SERPL-CCNC: 13 U/L (ref 10–35)
ANION GAP SERPL CALCULATED.3IONS-SCNC: 10 MMOL/L (ref 8–16)
AST SERPL-CCNC: 20 U/L (ref 10–35)
BASOPHILS # BLD: 0 K/UL (ref 0–0.2)
BASOPHILS NFR BLD: 0.7 % (ref 0–1)
BILIRUB SERPL-MCNC: 0.4 MG/DL (ref 0.2–1.2)
BUN SERPL-MCNC: 12 MG/DL (ref 6–20)
CALCIUM SERPL-MCNC: 9.5 MG/DL (ref 8.6–10)
CHLORIDE SERPL-SCNC: 102 MMOL/L (ref 98–107)
CHOLEST SERPL-MCNC: 199 MG/DL (ref 0–199)
CO2 SERPL-SCNC: 25 MMOL/L (ref 22–29)
CORTIS AM PEAK SERPL-MCNC: 13.1 UG/DL (ref 4.8–19.5)
CREAT SERPL-MCNC: 0.7 MG/DL (ref 0.5–0.9)
EOSINOPHIL # BLD: 0.1 K/UL (ref 0–0.6)
EOSINOPHIL NFR BLD: 2.2 % (ref 0–5)
ERYTHROCYTE [DISTWIDTH] IN BLOOD BY AUTOMATED COUNT: 12.9 % (ref 11.5–14.5)
ESTRADIOL SERPL-SCNC: 16.7 PG/ML
GLUCOSE SERPL-MCNC: 89 MG/DL (ref 70–99)
HBA1C MFR BLD: 5 % (ref 4–5.6)
HCT VFR BLD AUTO: 45.8 % (ref 37–47)
HDLC SERPL-MCNC: 93 MG/DL (ref 40–60)
HGB BLD-MCNC: 14.8 G/DL (ref 12–16)
IMM GRANULOCYTES # BLD: 0 K/UL
LDLC SERPL CALC-MCNC: 93 MG/DL
LYMPHOCYTES # BLD: 1.4 K/UL (ref 1.1–4.5)
LYMPHOCYTES NFR BLD: 34.2 % (ref 20–40)
MCH RBC QN AUTO: 28.6 PG (ref 27–31)
MCHC RBC AUTO-ENTMCNC: 32.3 G/DL (ref 33–37)
MCV RBC AUTO: 88.4 FL (ref 81–99)
MONOCYTES # BLD: 0.4 K/UL (ref 0–0.9)
MONOCYTES NFR BLD: 9.5 % (ref 0–10)
NEUTROPHILS # BLD: 2.1 K/UL (ref 1.5–7.5)
NEUTS SEG NFR BLD: 53.2 % (ref 50–65)
PLATELET # BLD AUTO: 223 K/UL (ref 130–400)
PMV BLD AUTO: 10.2 FL (ref 9.4–12.3)
POTASSIUM SERPL-SCNC: 4.2 MMOL/L (ref 3.5–5.1)
PROGEST SERPL-MCNC: 0.47 NG/ML
PROT SERPL-MCNC: 6.7 G/DL (ref 6.4–8.3)
RBC # BLD AUTO: 5.18 M/UL (ref 4.2–5.4)
SODIUM SERPL-SCNC: 137 MMOL/L (ref 136–145)
T3FREE SERPL-MCNC: 2.5 PG/ML (ref 2–4.4)
T4 FREE SERPL-MCNC: 1.38 NG/DL (ref 0.93–1.7)
TESTOST SERPL-MCNC: 2.9 NG/DL (ref 2.9–40.8)
TRIGL SERPL-MCNC: 64 MG/DL (ref 0–149)
TSH SERPL DL<=0.005 MIU/L-ACNC: 1.96 UIU/ML (ref 0.27–4.2)
WBC # BLD AUTO: 4 K/UL (ref 4.8–10.8)

## 2025-05-10 LAB — DHEA-S SERPL-MCNC: 24 UG/DL (ref 19–205)

## 2025-05-12 ENCOUNTER — OFFICE VISIT (OUTPATIENT)
Dept: INTERNAL MEDICINE | Age: 60
End: 2025-05-12
Payer: COMMERCIAL

## 2025-05-12 VITALS
WEIGHT: 137.8 LBS | BODY MASS INDEX: 21.63 KG/M2 | HEART RATE: 70 BPM | SYSTOLIC BLOOD PRESSURE: 122 MMHG | OXYGEN SATURATION: 99 % | DIASTOLIC BLOOD PRESSURE: 82 MMHG | HEIGHT: 67 IN

## 2025-05-12 DIAGNOSIS — E03.9 HYPOTHYROIDISM, UNSPECIFIED TYPE: ICD-10-CM

## 2025-05-12 DIAGNOSIS — F41.9 ANXIETY: Primary | ICD-10-CM

## 2025-05-12 DIAGNOSIS — E55.9 VITAMIN D DEFICIENCY: ICD-10-CM

## 2025-05-12 DIAGNOSIS — E78.5 HYPERLIPIDEMIA, UNSPECIFIED HYPERLIPIDEMIA TYPE: ICD-10-CM

## 2025-05-12 DIAGNOSIS — R10.12 LEFT UPPER QUADRANT ABDOMINAL PAIN: ICD-10-CM

## 2025-05-12 DIAGNOSIS — E34.9 HORMONAL DISORDER: ICD-10-CM

## 2025-05-12 DIAGNOSIS — K58.9 IRRITABLE BOWEL SYNDROME, UNSPECIFIED TYPE: ICD-10-CM

## 2025-05-12 DIAGNOSIS — K21.9 GASTROESOPHAGEAL REFLUX DISEASE WITHOUT ESOPHAGITIS: ICD-10-CM

## 2025-05-12 DIAGNOSIS — M25.50 ARTHRALGIA, UNSPECIFIED JOINT: ICD-10-CM

## 2025-05-12 PROCEDURE — 90677 PCV20 VACCINE IM: CPT | Performed by: INTERNAL MEDICINE

## 2025-05-12 PROCEDURE — 90471 IMMUNIZATION ADMIN: CPT | Performed by: INTERNAL MEDICINE

## 2025-05-12 PROCEDURE — 99214 OFFICE O/P EST MOD 30 MIN: CPT | Performed by: INTERNAL MEDICINE

## 2025-05-12 RX ORDER — ERGOCALCIFEROL 1.25 MG/1
CAPSULE, LIQUID FILLED ORAL
Qty: 12 CAPSULE | Refills: 3 | Status: SHIPPED | OUTPATIENT
Start: 2025-05-12

## 2025-05-12 RX ORDER — ESTRADIOL 0.07 MG/D
1 PATCH TRANSDERMAL
Qty: 4 PATCH | Refills: 3 | Status: SHIPPED | OUTPATIENT
Start: 2025-05-12

## 2025-05-12 RX ORDER — BUSPIRONE HYDROCHLORIDE 5 MG/1
5 TABLET ORAL 2 TIMES DAILY
Qty: 60 TABLET | Refills: 2 | Status: SHIPPED | OUTPATIENT
Start: 2025-05-12

## 2025-05-12 RX ORDER — LEVOTHYROXINE SODIUM 112 UG/1
112 TABLET ORAL DAILY
Qty: 90 TABLET | Refills: 1 | Status: SHIPPED | OUTPATIENT
Start: 2025-05-12

## 2025-05-12 RX ORDER — ESTRADIOL 0.05 MG/D
1 PATCH TRANSDERMAL WEEKLY
Qty: 4 PATCH | Refills: 3 | Status: SHIPPED | OUTPATIENT
Start: 2025-05-12 | End: 2025-05-12

## 2025-05-12 RX ORDER — MELOXICAM 7.5 MG/1
7.5 TABLET ORAL DAILY PRN
Qty: 30 TABLET | Refills: 2 | Status: SHIPPED | OUTPATIENT
Start: 2025-05-12

## 2025-05-12 RX ORDER — PROGESTERONE 100 MG/1
100 CAPSULE ORAL DAILY
Qty: 30 CAPSULE | Refills: 2 | Status: SHIPPED | OUTPATIENT
Start: 2025-05-12

## 2025-05-12 SDOH — ECONOMIC STABILITY: FOOD INSECURITY: WITHIN THE PAST 12 MONTHS, THE FOOD YOU BOUGHT JUST DIDN'T LAST AND YOU DIDN'T HAVE MONEY TO GET MORE.: NEVER TRUE

## 2025-05-12 SDOH — ECONOMIC STABILITY: FOOD INSECURITY: WITHIN THE PAST 12 MONTHS, YOU WORRIED THAT YOUR FOOD WOULD RUN OUT BEFORE YOU GOT MONEY TO BUY MORE.: NEVER TRUE

## 2025-05-12 ASSESSMENT — PATIENT HEALTH QUESTIONNAIRE - PHQ9
1. LITTLE INTEREST OR PLEASURE IN DOING THINGS: NOT AT ALL
SUM OF ALL RESPONSES TO PHQ QUESTIONS 1-9: 0
9. THOUGHTS THAT YOU WOULD BE BETTER OFF DEAD, OR OF HURTING YOURSELF: NOT AT ALL
8. MOVING OR SPEAKING SO SLOWLY THAT OTHER PEOPLE COULD HAVE NOTICED. OR THE OPPOSITE, BEING SO FIGETY OR RESTLESS THAT YOU HAVE BEEN MOVING AROUND A LOT MORE THAN USUAL: NOT AT ALL
3. TROUBLE FALLING OR STAYING ASLEEP: NOT AT ALL
7. TROUBLE CONCENTRATING ON THINGS, SUCH AS READING THE NEWSPAPER OR WATCHING TELEVISION: NOT AT ALL
4. FEELING TIRED OR HAVING LITTLE ENERGY: NOT AT ALL
SUM OF ALL RESPONSES TO PHQ QUESTIONS 1-9: 0
5. POOR APPETITE OR OVEREATING: NOT AT ALL
2. FEELING DOWN, DEPRESSED OR HOPELESS: NOT AT ALL
10. IF YOU CHECKED OFF ANY PROBLEMS, HOW DIFFICULT HAVE THESE PROBLEMS MADE IT FOR YOU TO DO YOUR WORK, TAKE CARE OF THINGS AT HOME, OR GET ALONG WITH OTHER PEOPLE: NOT DIFFICULT AT ALL
6. FEELING BAD ABOUT YOURSELF - OR THAT YOU ARE A FAILURE OR HAVE LET YOURSELF OR YOUR FAMILY DOWN: NOT AT ALL

## 2025-05-12 NOTE — PROGRESS NOTES
After obtaining consent, and per orders of Dr. Trujillo, injection of Prevnar 20 given in Right deltoid by Daina Perez MA.

## 2025-05-12 NOTE — PROGRESS NOTES
Chief Complaint   Patient presents with    6 Month Follow-Up     Pt has no concerns, discuss hormone levels she is tired all the time        HPI: Nicki Rubio is a 59 y.o. female is here for follow-up of anxiety, hormonal disorder disorder, IBS, hypothyroidism, joint pain, acid reflux, vitamin D deficiency.    She still following up with gastroenterology.  She still has increased wheezing since she does plan to get back in touch with her gastroenterologist.  She was told that they may need to change extensive workup has been negative for any significant pathological process.    She feels like a lot of her issues were normal in nature.  She also noted that her testosterone was a little low.  I did ell her that she could go to a compounding pharmacy for testosterone replacement. She feels like the Climara patch is working well for her.  However, she feels like she would benefit from an increased dosage.    Levsin is helpful for IBS.  Her thyroid function is stable.  Her joint pain is well-controlled on her current dose of Mobic.  She states that she has not been taking her progesterone.  Her progesterone was a little bit low.  We will resume her progesterone therapy.    She does have a history of vitamin D deficiency.  Her vitamin D level is within normal limits.  Her acid reflux is well-controlled on Prilosec.    Her testosterone has decreased from 225-199.        Past Medical History:   Diagnosis Date    Acid reflux     Anemia     BRCA negative     Chronic back pain     GERD (gastroesophageal reflux disease)     Hypothyroidism     Irritable bowel syndrome     Premature atrial contraction     Skipped heart beats     Thyroid disease     Thyroid mass     Vitamin D deficiency       Past Surgical History:   Procedure Laterality Date    BREAST BIOPSY Right 2012    benign    BREAST ENHANCEMENT SURGERY Bilateral 1999    saline,retro-pectoral    CHOLECYSTECTOMY      COLONOSCOPY  05/25/2005    Dr Moctezuma-[facility], no active

## 2025-05-13 ASSESSMENT — ENCOUNTER SYMPTOMS
NAUSEA: 0
SORE THROAT: 0
EYE DISCHARGE: 0
CHOKING: 0
COLOR CHANGE: 0
COUGH: 0
CONSTIPATION: 0
WHEEZING: 0
PHOTOPHOBIA: 0
TROUBLE SWALLOWING: 0
SINUS PRESSURE: 0
ABDOMINAL DISTENTION: 0
DIARRHEA: 0
ANAL BLEEDING: 0
SHORTNESS OF BREATH: 0
RHINORRHEA: 0
CHEST TIGHTNESS: 0
VOMITING: 0
SINUS PAIN: 0
FACIAL SWELLING: 0
VOICE CHANGE: 0
BACK PAIN: 1
BLOOD IN STOOL: 0
EYE PAIN: 0
ABDOMINAL PAIN: 1
RECTAL PAIN: 0
EYE REDNESS: 0
EYE ITCHING: 0

## 2025-06-10 ENCOUNTER — HOSPITAL ENCOUNTER (OUTPATIENT)
Dept: WOMENS IMAGING | Age: 60
Discharge: HOME | End: 2025-06-10
Payer: COMMERCIAL

## 2025-06-10 DIAGNOSIS — R92.0 MICROCALCIFICATION OF RIGHT BREAST ON MAMMOGRAM: ICD-10-CM

## 2025-06-10 PROCEDURE — G0279 TOMOSYNTHESIS, MAMMO: HCPCS

## 2025-06-11 ENCOUNTER — OFFICE VISIT (OUTPATIENT)
Dept: GASTROENTEROLOGY | Age: 60
End: 2025-06-11
Payer: COMMERCIAL

## 2025-06-11 VITALS
HEIGHT: 67 IN | HEART RATE: 73 BPM | WEIGHT: 139 LBS | DIASTOLIC BLOOD PRESSURE: 74 MMHG | BODY MASS INDEX: 21.82 KG/M2 | OXYGEN SATURATION: 99 % | SYSTOLIC BLOOD PRESSURE: 126 MMHG

## 2025-06-11 DIAGNOSIS — K21.9 GASTROESOPHAGEAL REFLUX DISEASE, UNSPECIFIED WHETHER ESOPHAGITIS PRESENT: ICD-10-CM

## 2025-06-11 DIAGNOSIS — R10.12 LUQ ABDOMINAL PAIN: Primary | ICD-10-CM

## 2025-06-11 PROCEDURE — 99214 OFFICE O/P EST MOD 30 MIN: CPT | Performed by: NURSE PRACTITIONER

## 2025-06-12 ENCOUNTER — OFFICE VISIT (OUTPATIENT)
Dept: SURGERY | Age: 60
End: 2025-06-12
Payer: COMMERCIAL

## 2025-06-12 VITALS — WEIGHT: 138 LBS | OXYGEN SATURATION: 99 % | BODY MASS INDEX: 21.66 KG/M2 | HEART RATE: 60 BPM | HEIGHT: 67 IN

## 2025-06-12 DIAGNOSIS — Z12.31 VISIT FOR SCREENING MAMMOGRAM: Primary | ICD-10-CM

## 2025-06-12 PROCEDURE — 99213 OFFICE O/P EST LOW 20 MIN: CPT | Performed by: PHYSICIAN ASSISTANT

## 2025-06-12 NOTE — PROGRESS NOTES
Nicki Rubio comes today for her follow-up breast exam.  She has had no new breast complaints.  She reports no new palpable masses.  There is no skin or nipple changes.  There is no nipple discharge.  She has no appreciable evidence of supraclavicular or axillary adenopathy.      She had a stereotactic breast biopsy December 6, 2024 which revealed:  Breast, right breast calcifications needle biopsies:   1.  Benign breast parenchyma with changes consistent with fibrocystic   mastopathy.   2. Microcalcifications and inspissated secretions are identified in the   appropriate areas.    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    Chronic heartburn 03/24/2022    Dyspepsia 03/24/2022    Nausea 03/24/2022    Esophageal pain 03/24/2022    Other fatigue 01/26/2022    Close exposure to COVID-19 virus 01/26/2022    Iron deficiency anemia secondary to inadequate dietary iron intake 08/29/2017    Breast tenderness in female 05/16/2017    Breast mass, left 05/16/2017    History of breast augmentation 05/16/2017    Chest pain     Hemorrhoids 11/20/2014    LLQ abdominal pain 11/28/2012    Chronic constipation 11/28/2012    Epigastric pressure 11/28/2012    Heartburn 11/28/2012    Indigestion 11/28/2012    Acid reflux 11/28/2012    Esophageal spasm 11/28/2012       Current Outpatient Medications   Medication Sig Dispense Refill    levothyroxine (SYNTHROID) 112 MCG tablet Take 1 tablet by mouth daily 90 tablet 1    vitamin D (ERGOCALCIFEROL) 1.25 MG (64548 UT) CAPS capsule TAKE 1 CAPSULE BY MOUTH ONE TIME WEEKLY 12 capsule 3    meloxicam (MOBIC) 7.5 MG tablet Take 1 tablet by

## 2025-06-18 ASSESSMENT — ENCOUNTER SYMPTOMS
COUGH: 0
CONSTIPATION: 0
DIARRHEA: 0
TROUBLE SWALLOWING: 0
VOMITING: 0
CHOKING: 0
ABDOMINAL PAIN: 0
BLOOD IN STOOL: 0
ANAL BLEEDING: 0
SHORTNESS OF BREATH: 0
RECTAL PAIN: 0
NAUSEA: 0
ABDOMINAL DISTENTION: 0

## 2025-06-19 NOTE — PROGRESS NOTES
Subjective:     Patient ID: Nicki Rubio is a 59 y.o. female  PCP: Lauren Trujillo MD  Referring Provider: No ref. provider found    HPI  History of Present Illness      Results        Assessment:     Assessment & Plan      {There are no diagnoses linked to this encounter. (Refresh or delete this SmartLink)}     Review of Systems    Plan:     ***  Orders  No orders of the defined types were placed in this encounter.    Medications  No orders of the defined types were placed in this encounter.        Patient History:     Past Medical History:   Diagnosis Date    Acid reflux     Anemia     BRCA negative     Chronic back pain     GERD (gastroesophageal reflux disease)     Hypothyroidism     Irritable bowel syndrome     Premature atrial contraction     Skipped heart beats     Thyroid disease     Thyroid mass     Vitamin D deficiency        Past Surgical History:   Procedure Laterality Date    BREAST BIOPSY Right 2012    benign    BREAST ENHANCEMENT SURGERY Bilateral 1999    saline,retro-pectoral    CHOLECYSTECTOMY      COLONOSCOPY  05/25/2005    Dr Moctezuma-[facility], no active colitis    COLONOSCOPY  12/12/2012    Dr Keyes-Redundant colon    COLONOSCOPY N/A 04/22/2021    Dr MUSA Escamilla-[facility], 5 yr recall    HEMORRHOID SURGERY      YUSRA STEREO BREAST BX W LOC DEVICE 1ST LESION RIGHT Right 12/6/2024    YUSRA STEREO BREAST BX W LOC DEVICE 1ST LESION RIGHT 12/6/2024 [facility]    THYROID SURGERY      Biopsy    THYROID SURGERY      nodule removed-partial thyroidectomy    TONSILLECTOMY      TUBAL LIGATION      UPPER GASTROINTESTINAL ENDOSCOPY  05/25/2005    Dr Moctezuma-No celiac, gastritis    UPPER GASTROINTESTINAL ENDOSCOPY  02/12/2015    Dr Billy neg, mild antral gastritis    UPPER GASTROINTESTINAL ENDOSCOPY N/A 08/05/2019    Dr Duque-normal exam, NEG EoE    UPPER GASTROINTESTINAL ENDOSCOPY  04/21/2022    Dr MUSA Escamilla-Esophagitis vs Guaman's, gastritis    UPPER GASTROINTESTINAL ENDOSCOPY  12/18/2012    Dr Billy 
reflux disease)     Hypothyroidism     Irritable bowel syndrome     Premature atrial contraction     Skipped heart beats     Thyroid disease     Thyroid mass     Vitamin D deficiency        Past Surgical History:   Procedure Laterality Date    BREAST BIOPSY Right 2012    benign    BREAST ENHANCEMENT SURGERY Bilateral 1999    saline,retro-pectoral    CHOLECYSTECTOMY      COLONOSCOPY  2005    Dr Moctezuma-[facility], no active colitis    COLONOSCOPY  2012    Dr Keyes-Redundant colon    COLONOSCOPY N/A 2021    Dr MUSA Escamilla-[facility], 5 yr recall    HEMORRHOID SURGERY      YUSRA STEREO BREAST BX W LOC DEVICE 1ST LESION RIGHT Right 2024    YUSRA STEREO BREAST BX W LOC DEVICE 1ST LESION RIGHT 2024 [facility]    THYROID SURGERY      Biopsy    THYROID SURGERY      nodule removed-partial thyroidectomy    TONSILLECTOMY      TUBAL LIGATION      UPPER GASTROINTESTINAL ENDOSCOPY  2005    Dr Moctezuma-No celiac, gastritis    UPPER GASTROINTESTINAL ENDOSCOPY  2015    Dr Keyes-Kim neg, mild antral gastritis    UPPER GASTROINTESTINAL ENDOSCOPY N/A 2019    Dr Duque-normal exam, NEG EoE    UPPER GASTROINTESTINAL ENDOSCOPY  2022    Dr MUSA Escamilla-Esophagitis vs Guaman's, gastritis    UPPER GASTROINTESTINAL ENDOSCOPY  2012    Dr Billy neg, antral gastritis, no celiac    UPPER GASTROINTESTINAL ENDOSCOPY N/A 2022    Dr MUSA Escamilla-Gastritis, esophagitis, no h pylori    UPPER GASTROINTESTINAL ENDOSCOPY N/A 2023    Dr Duque, Essentially benign (-)sprue  (-)EOE,  sm 2 cm sliding hh, no clear cut lesions to explains symptoms,       Family History   Problem Relation Age of Onset    Other Mother         brain bleed    Dementia Mother     High Blood Pressure Mother     Lung Cancer Father          55, lung cancer    Breast Cancer Sister 48         at 54, did not wake up after a nap    Heart Attack Sister     Other Maternal Uncle         brain aneurysm    Rectal

## 2025-06-23 ENCOUNTER — TELEPHONE (OUTPATIENT)
Dept: GASTROENTEROLOGY | Age: 60
End: 2025-06-23

## 2025-06-23 ENCOUNTER — TELEPHONE (OUTPATIENT)
Dept: INTERNAL MEDICINE | Age: 60
End: 2025-06-23

## 2025-06-23 DIAGNOSIS — R10.12 LUQ ABDOMINAL PAIN: Primary | ICD-10-CM

## 2025-06-23 DIAGNOSIS — E34.9 HORMONAL DISORDER: Primary | ICD-10-CM

## 2025-06-23 DIAGNOSIS — K22.10 EROSIVE ESOPHAGITIS: ICD-10-CM

## 2025-06-23 RX ORDER — ESTRADIOL 0.05 MG/D
1 PATCH TRANSDERMAL WEEKLY
Qty: 12 PATCH | Refills: 1 | Status: SHIPPED | OUTPATIENT
Start: 2025-06-23

## 2025-06-23 RX ORDER — VONOPRAZAN FUMARATE 26.72 MG/1
1 TABLET ORAL DAILY
Qty: 30 TABLET | Refills: 11 | Status: SHIPPED | OUTPATIENT
Start: 2025-06-23

## 2025-06-23 NOTE — TELEPHONE ENCOUNTER
Patient called stating Simin had given her some samples of Voquenza. She stated she told her if it was working well that she would give her more samples and send in a script for her. Patient is asking if she can have some more samples and pick them up possibly today. She is leaving tomorrow to go out of town. I informed her that Simin is out of the office on Mondays but that I would pass this along to her medical assistant to see if she can get those samples for her and then Simin can call in the script. I informed her we would call her back. She voiced understanding.

## 2025-06-23 NOTE — TELEPHONE ENCOUNTER
Patient requesting Estradiol 0.5 due to spotting on higher dose. Also requesting Specifically the \"mylan patch\". Ok to send in?

## 2025-09-02 DIAGNOSIS — E34.9 HORMONAL DISORDER: ICD-10-CM

## 2025-09-02 RX ORDER — PROGESTERONE 100 MG/1
100 CAPSULE ORAL DAILY
Qty: 30 CAPSULE | Refills: 2 | Status: SHIPPED | OUTPATIENT
Start: 2025-09-02

## (undated) DEVICE — FORCEPS BX L240CM JAW DIA2.4MM ORNG L CAP W/ NDL DISP RAD

## (undated) DEVICE — ENDO KIT: Brand: MEDLINE INDUSTRIES, INC.

## (undated) DEVICE — CANNULA NSL AD L7FT DIV O2 CO2 W/ M LUERLOCK TRMPT CONN

## (undated) DEVICE — BITE BLOCK ENDOSCP AD 60 FR W/ ADJ STRP PLAS GRN BLOX

## (undated) DEVICE — FORCEPS BX 240CM 2.4MM L NDL RAD JAW 4 M00513334

## (undated) DEVICE — SINGLE PORT MANIFOLD: Brand: NEPTUNE 2

## (undated) DEVICE — ENDO KIT,LOURDES HOSPITAL: Brand: MEDLINE INDUSTRIES, INC.